# Patient Record
Sex: MALE | Race: WHITE | NOT HISPANIC OR LATINO | Employment: OTHER | URBAN - METROPOLITAN AREA
[De-identification: names, ages, dates, MRNs, and addresses within clinical notes are randomized per-mention and may not be internally consistent; named-entity substitution may affect disease eponyms.]

---

## 2023-04-14 ENCOUNTER — HOSPITAL ENCOUNTER (OUTPATIENT)
Dept: DATA CONVERSION | Facility: HOSPITAL | Age: 73
End: 2023-04-14
Attending: PODIATRIST | Admitting: PODIATRIST

## 2023-04-14 DIAGNOSIS — G89.29 OTHER CHRONIC PAIN: ICD-10-CM

## 2023-04-14 DIAGNOSIS — I47.10 SUPRAVENTRICULAR TACHYCARDIA (CMS-HCC): ICD-10-CM

## 2023-04-14 DIAGNOSIS — Z79.82 LONG TERM (CURRENT) USE OF ASPIRIN: ICD-10-CM

## 2023-04-14 DIAGNOSIS — J44.9 CHRONIC OBSTRUCTIVE PULMONARY DISEASE, UNSPECIFIED (MULTI): ICD-10-CM

## 2023-04-14 DIAGNOSIS — H35.30 UNSPECIFIED MACULAR DEGENERATION: ICD-10-CM

## 2023-04-14 DIAGNOSIS — M54.9 DORSALGIA, UNSPECIFIED: ICD-10-CM

## 2023-04-14 DIAGNOSIS — I70.262 ATHEROSCLEROSIS OF NATIVE ARTERIES OF EXTREMITIES WITH GANGRENE, LEFT LEG (MULTI): ICD-10-CM

## 2023-04-14 DIAGNOSIS — F22 DELUSIONAL DISORDERS (MULTI): ICD-10-CM

## 2023-04-14 DIAGNOSIS — A52.3: ICD-10-CM

## 2023-04-14 DIAGNOSIS — E87.1 HYPO-OSMOLALITY AND HYPONATREMIA: ICD-10-CM

## 2023-04-14 DIAGNOSIS — N40.0 BENIGN PROSTATIC HYPERPLASIA WITHOUT LOWER URINARY TRACT SYMPTOMS: ICD-10-CM

## 2023-04-14 DIAGNOSIS — F03.92: ICD-10-CM

## 2023-04-14 DIAGNOSIS — F03.918 UNSPECIFIED DEMENTIA, UNSPECIFIED SEVERITY, WITH OTHER BEHAVIORAL DISTURBANCE (MULTI): ICD-10-CM

## 2023-04-14 DIAGNOSIS — M54.12 RADICULOPATHY, CERVICAL REGION: ICD-10-CM

## 2023-04-14 DIAGNOSIS — I10 ESSENTIAL (PRIMARY) HYPERTENSION: ICD-10-CM

## 2023-04-14 DIAGNOSIS — I96 GANGRENE, NOT ELSEWHERE CLASSIFIED (MULTI): ICD-10-CM

## 2023-04-14 DIAGNOSIS — E87.6 HYPOKALEMIA: ICD-10-CM

## 2023-04-14 DIAGNOSIS — F03.94: ICD-10-CM

## 2023-04-14 DIAGNOSIS — E78.5 HYPERLIPIDEMIA, UNSPECIFIED: ICD-10-CM

## 2023-04-14 DIAGNOSIS — F17.200 NICOTINE DEPENDENCE, UNSPECIFIED, UNCOMPLICATED: ICD-10-CM

## 2023-04-16 LAB
GRAM STAIN: NORMAL
TISSUE/WOUND CULTURE/SMEAR: NORMAL

## 2023-04-22 LAB
ATRIAL RATE: 64 BPM
P AXIS: 35 DEGREES
P OFFSET: 198 MS
P ONSET: 139 MS
PR INTERVAL: 160 MS
Q ONSET: 219 MS
QRS COUNT: 10 BEATS
QRS DURATION: 90 MS
QT INTERVAL: 436 MS
QTC CALCULATION(BAZETT): 449 MS
QTC FREDERICIA: 445 MS
R AXIS: 20 DEGREES
T AXIS: 37 DEGREES
T OFFSET: 437 MS
VENTRICULAR RATE: 64 BPM

## 2023-05-08 LAB
COMPLETE PATHOLOGY REPORT: NORMAL
CONVERTED CLINICAL DIAGNOSIS-HISTORY: NORMAL
CONVERTED FINAL DIAGNOSIS: NORMAL
CONVERTED FINAL REPORT PDF LINK TO COPY AND PASTE: NORMAL
CONVERTED GROSS DESCRIPTION: NORMAL

## 2023-08-02 ENCOUNTER — TELEPHONE (OUTPATIENT)
Dept: PRIMARY CARE | Facility: CLINIC | Age: 73
End: 2023-08-02

## 2023-09-07 VITALS — HEIGHT: 69 IN | BODY MASS INDEX: 34.06 KG/M2 | WEIGHT: 229.94 LBS

## 2023-09-14 NOTE — H&P
History & Physical Reviewed:   I have reviewed the History and Physical dated:  04-Apr-2023   History and Physical reviewed and relevant findings noted. Patient examined to review pertinent physical  findings.: No significant changes   Home Medications Reviewed: no changes noted   Allergies Reviewed: no changes noted       ERAS (Enhanced Recovery After Surgery):  ·  ERAS Patient: no     Consent:   COVID-19 Consent:  ·  COVID-19 Risk Consent Surgeon has reviewed key risks related to the risk of darrell COVID-19 and if they contract COVID-19 what the risks are.     Attestation:   Note Completion:  I am a:  Resident/Fellow   Attending Attestation I saw and evaluated the patient.  I personally obtained the key and critical portions of the history and physical exam or was physically present for key and  critical portions performed by the resident/fellow. I reviewed the resident/fellow?s documentation and discussed the patient with the resident/fellow.  I agree with the resident/fellow?s medical decision making as documented in the note.     I personally evaluated the patient on 14-Apr-2023         Electronic Signatures:  Eldon Mcqueen (NORIS)  (Signed 17-Apr-2023 08:22)   Authored: Note Completion   Co-Signer: History & Physical Reviewed, ERAS, Consent, Note Completion  Jayla Padilla (NORIS (Resident))  (Signed 14-Apr-2023 07:10)   Authored: History & Physical Reviewed, ERAS, Consent,  Note Completion      Last Updated: 17-Apr-2023 08:22 by Eldon Mcqueen (NORIS)

## 2023-10-02 NOTE — OP NOTE
PROCEDURE DETAILS    Preoperative Diagnosis:  PAD Left foot  Dry Gangrene left hallux   Postoperative Diagnosis:  PAD Left foot  Dry Gangrene left hallux   Surgeon: Dr. Eldon Mcqueen DPM   Resident/Fellow/Other Assistant: Jayla Padilla, PGY-3    Procedure:  1. Left hallux amputation  Anesthesia: MAC  Estimated Blood Loss: 5cc  Findings: See operative report  Specimens(s) Collected: yes,  Deep culture swab sent to micro   Complications: None  Patient Returned To/Condition: PACU with VSS and perfusion noted to the digits                                Attestation:   Note Completion:  Attending Attestation I was present for the entire procedure    I am a: Resident/Fellow         Electronic Signatures:  Eldon Mcqueen (NORIS)  (Signed 17-Apr-2023 08:22)   Authored: Note Completion   Co-Signer: Post-Operative Note, Chart Review, Note Completion  Jayla Padilla (NORIS (Resident))  (Signed 14-Apr-2023 08:40)   Authored: Post-Operative Note, Chart Review, Note Completion      Last Updated: 17-Apr-2023 08:22 by Eldon Mcqueen (NORIS)

## 2024-02-15 ENCOUNTER — APPOINTMENT (OUTPATIENT)
Dept: RADIOLOGY | Facility: HOSPITAL | Age: 74
End: 2024-02-15
Payer: MEDICARE

## 2024-02-15 ENCOUNTER — HOSPITAL ENCOUNTER (EMERGENCY)
Facility: HOSPITAL | Age: 74
Discharge: HOME | End: 2024-02-15
Attending: STUDENT IN AN ORGANIZED HEALTH CARE EDUCATION/TRAINING PROGRAM
Payer: MEDICARE

## 2024-02-15 ENCOUNTER — APPOINTMENT (OUTPATIENT)
Dept: CARDIOLOGY | Facility: HOSPITAL | Age: 74
End: 2024-02-15
Payer: MEDICARE

## 2024-02-15 VITALS
SYSTOLIC BLOOD PRESSURE: 117 MMHG | TEMPERATURE: 98.6 F | RESPIRATION RATE: 17 BRPM | HEIGHT: 68 IN | WEIGHT: 158 LBS | HEART RATE: 55 BPM | DIASTOLIC BLOOD PRESSURE: 58 MMHG | OXYGEN SATURATION: 96 % | BODY MASS INDEX: 23.95 KG/M2

## 2024-02-15 DIAGNOSIS — M54.50 CHRONIC LOW BACK PAIN WITHOUT SCIATICA, UNSPECIFIED BACK PAIN LATERALITY: ICD-10-CM

## 2024-02-15 DIAGNOSIS — G89.29 CHRONIC LOW BACK PAIN WITHOUT SCIATICA, UNSPECIFIED BACK PAIN LATERALITY: ICD-10-CM

## 2024-02-15 DIAGNOSIS — R07.9 CHEST PAIN, UNSPECIFIED TYPE: Primary | ICD-10-CM

## 2024-02-15 DIAGNOSIS — R42 DIZZINESS: ICD-10-CM

## 2024-02-15 LAB
ALBUMIN SERPL BCP-MCNC: 3.8 G/DL (ref 3.4–5)
ALP SERPL-CCNC: 52 U/L (ref 33–136)
ALT SERPL W P-5'-P-CCNC: 10 U/L (ref 10–52)
ANION GAP SERPL CALC-SCNC: 9 MMOL/L (ref 10–20)
APPEARANCE UR: CLEAR
APTT PPP: 39 SECONDS (ref 27–38)
AST SERPL W P-5'-P-CCNC: 15 U/L (ref 9–39)
BASOPHILS # BLD AUTO: 0.06 X10*3/UL (ref 0–0.1)
BASOPHILS NFR BLD AUTO: 0.7 %
BILIRUB SERPL-MCNC: 0.4 MG/DL (ref 0–1.2)
BILIRUB UR STRIP.AUTO-MCNC: NEGATIVE MG/DL
BNP SERPL-MCNC: 26 PG/ML (ref 0–99)
BUN SERPL-MCNC: 13 MG/DL (ref 6–23)
CALCIUM SERPL-MCNC: 9 MG/DL (ref 8.6–10.3)
CARDIAC TROPONIN I PNL SERPL HS: 6 NG/L (ref 0–20)
CARDIAC TROPONIN I PNL SERPL HS: 7 NG/L (ref 0–20)
CHLORIDE SERPL-SCNC: 104 MMOL/L (ref 98–107)
CO2 SERPL-SCNC: 28 MMOL/L (ref 21–32)
COLOR UR: YELLOW
CREAT SERPL-MCNC: 0.64 MG/DL (ref 0.5–1.3)
EGFRCR SERPLBLD CKD-EPI 2021: >90 ML/MIN/1.73M*2
EOSINOPHIL # BLD AUTO: 0.15 X10*3/UL (ref 0–0.4)
EOSINOPHIL NFR BLD AUTO: 1.8 %
ERYTHROCYTE [DISTWIDTH] IN BLOOD BY AUTOMATED COUNT: 14.6 % (ref 11.5–14.5)
FLUAV RNA RESP QL NAA+PROBE: NOT DETECTED
FLUBV RNA RESP QL NAA+PROBE: NOT DETECTED
GLUCOSE SERPL-MCNC: 91 MG/DL (ref 74–99)
GLUCOSE UR STRIP.AUTO-MCNC: NEGATIVE MG/DL
HCT VFR BLD AUTO: 34.4 % (ref 41–52)
HGB BLD-MCNC: 11.7 G/DL (ref 13.5–17.5)
IMM GRANULOCYTES # BLD AUTO: 0.01 X10*3/UL (ref 0–0.5)
IMM GRANULOCYTES NFR BLD AUTO: 0.1 % (ref 0–0.9)
INR PPP: 1 (ref 0.9–1.1)
KETONES UR STRIP.AUTO-MCNC: NEGATIVE MG/DL
LACTATE SERPL-SCNC: 0.6 MMOL/L (ref 0.4–2)
LEUKOCYTE ESTERASE UR QL STRIP.AUTO: NEGATIVE
LYMPHOCYTES # BLD AUTO: 1.85 X10*3/UL (ref 0.8–3)
LYMPHOCYTES NFR BLD AUTO: 22.4 %
MAGNESIUM SERPL-MCNC: 1.96 MG/DL (ref 1.6–2.4)
MCH RBC QN AUTO: 31.7 PG (ref 26–34)
MCHC RBC AUTO-ENTMCNC: 34 G/DL (ref 32–36)
MCV RBC AUTO: 93 FL (ref 80–100)
MONOCYTES # BLD AUTO: 0.77 X10*3/UL (ref 0.05–0.8)
MONOCYTES NFR BLD AUTO: 9.3 %
NEUTROPHILS # BLD AUTO: 5.42 X10*3/UL (ref 1.6–5.5)
NEUTROPHILS NFR BLD AUTO: 65.7 %
NITRITE UR QL STRIP.AUTO: NEGATIVE
NRBC BLD-RTO: 0 /100 WBCS (ref 0–0)
PH UR STRIP.AUTO: 5 [PH]
PLATELET # BLD AUTO: 282 X10*3/UL (ref 150–450)
POTASSIUM SERPL-SCNC: 3.5 MMOL/L (ref 3.5–5.3)
PROT SERPL-MCNC: 6 G/DL (ref 6.4–8.2)
PROT UR STRIP.AUTO-MCNC: NEGATIVE MG/DL
PROTHROMBIN TIME: 11.8 SECONDS (ref 9.8–12.8)
RBC # BLD AUTO: 3.69 X10*6/UL (ref 4.5–5.9)
RBC # UR STRIP.AUTO: NEGATIVE /UL
SARS-COV-2 RNA RESP QL NAA+PROBE: NOT DETECTED
SODIUM SERPL-SCNC: 137 MMOL/L (ref 136–145)
SP GR UR STRIP.AUTO: 1.06
TSH SERPL-ACNC: 0.8 MIU/L (ref 0.44–3.98)
UROBILINOGEN UR STRIP.AUTO-MCNC: <2 MG/DL
WBC # BLD AUTO: 8.3 X10*3/UL (ref 4.4–11.3)

## 2024-02-15 PROCEDURE — 2550000001 HC RX 255 CONTRASTS: Performed by: STUDENT IN AN ORGANIZED HEALTH CARE EDUCATION/TRAINING PROGRAM

## 2024-02-15 PROCEDURE — 81003 URINALYSIS AUTO W/O SCOPE: CPT

## 2024-02-15 PROCEDURE — 87636 SARSCOV2 & INF A&B AMP PRB: CPT

## 2024-02-15 PROCEDURE — 80053 COMPREHEN METABOLIC PANEL: CPT

## 2024-02-15 PROCEDURE — 71275 CT ANGIOGRAPHY CHEST: CPT | Performed by: RADIOLOGY

## 2024-02-15 PROCEDURE — 70450 CT HEAD/BRAIN W/O DYE: CPT | Performed by: RADIOLOGY

## 2024-02-15 PROCEDURE — 85610 PROTHROMBIN TIME: CPT

## 2024-02-15 PROCEDURE — 74174 CTA ABD&PLVS W/CONTRAST: CPT | Performed by: RADIOLOGY

## 2024-02-15 PROCEDURE — 36415 COLL VENOUS BLD VENIPUNCTURE: CPT

## 2024-02-15 PROCEDURE — 36415 COLL VENOUS BLD VENIPUNCTURE: CPT | Performed by: STUDENT IN AN ORGANIZED HEALTH CARE EDUCATION/TRAINING PROGRAM

## 2024-02-15 PROCEDURE — 83605 ASSAY OF LACTIC ACID: CPT

## 2024-02-15 PROCEDURE — 93005 ELECTROCARDIOGRAM TRACING: CPT

## 2024-02-15 PROCEDURE — 84484 ASSAY OF TROPONIN QUANT: CPT

## 2024-02-15 PROCEDURE — 83735 ASSAY OF MAGNESIUM: CPT

## 2024-02-15 PROCEDURE — 99285 EMERGENCY DEPT VISIT HI MDM: CPT | Mod: 25 | Performed by: STUDENT IN AN ORGANIZED HEALTH CARE EDUCATION/TRAINING PROGRAM

## 2024-02-15 PROCEDURE — 71275 CT ANGIOGRAPHY CHEST: CPT

## 2024-02-15 PROCEDURE — 84443 ASSAY THYROID STIM HORMONE: CPT

## 2024-02-15 PROCEDURE — 85025 COMPLETE CBC W/AUTO DIFF WBC: CPT

## 2024-02-15 PROCEDURE — 84484 ASSAY OF TROPONIN QUANT: CPT | Performed by: STUDENT IN AN ORGANIZED HEALTH CARE EDUCATION/TRAINING PROGRAM

## 2024-02-15 PROCEDURE — 85730 THROMBOPLASTIN TIME PARTIAL: CPT

## 2024-02-15 PROCEDURE — 83880 ASSAY OF NATRIURETIC PEPTIDE: CPT

## 2024-02-15 PROCEDURE — 70450 CT HEAD/BRAIN W/O DYE: CPT

## 2024-02-15 RX ORDER — AMLODIPINE BESYLATE 10 MG/1
1 TABLET ORAL DAILY
COMMUNITY
Start: 2023-08-07 | End: 2024-02-15 | Stop reason: ENTERED-IN-ERROR

## 2024-02-15 RX ORDER — NAPROXEN SODIUM 220 MG/1
81 TABLET, FILM COATED ORAL NIGHTLY
COMMUNITY
Start: 2011-03-28

## 2024-02-15 RX ORDER — ATORVASTATIN CALCIUM 80 MG/1
80 TABLET, FILM COATED ORAL NIGHTLY
COMMUNITY
Start: 2023-04-04 | End: 2024-04-03

## 2024-02-15 RX ORDER — CARBOXYMETHYLCELLULOSE SODIUM 5 MG/ML
1 SOLUTION/ DROPS OPHTHALMIC 3 TIMES DAILY PRN
COMMUNITY
Start: 2022-06-10 | End: 2024-02-20 | Stop reason: ALTCHOICE

## 2024-02-15 RX ORDER — CHOLECALCIFEROL (VITAMIN D3) 50 MCG
2000 TABLET ORAL DAILY
COMMUNITY
Start: 2023-05-26

## 2024-02-15 RX ORDER — DONEPEZIL HYDROCHLORIDE 10 MG/1
1 TABLET, FILM COATED ORAL NIGHTLY
COMMUNITY
Start: 2023-05-26

## 2024-02-15 RX ORDER — MULTIVIT-MIN/IRON FUM/FOLIC AC 7.5 MG-4
1 TABLET ORAL DAILY
COMMUNITY
Start: 2020-05-01

## 2024-02-15 RX ORDER — LISINOPRIL 10 MG/1
1 TABLET ORAL DAILY
COMMUNITY
Start: 2023-05-26

## 2024-02-15 RX ORDER — SENNOSIDES 8.6 MG/1
1 TABLET ORAL NIGHTLY
COMMUNITY
Start: 2023-05-26

## 2024-02-15 RX ORDER — PREGABALIN 50 MG/1
50 CAPSULE ORAL 2 TIMES DAILY
COMMUNITY
Start: 2023-04-11

## 2024-02-15 RX ORDER — ACETAMINOPHEN 325 MG/1
650 TABLET ORAL EVERY 6 HOURS PRN
COMMUNITY
Start: 2011-03-28 | End: 2024-02-28 | Stop reason: HOSPADM

## 2024-02-15 RX ORDER — ALBUTEROL SULFATE 90 UG/1
2 AEROSOL, METERED RESPIRATORY (INHALATION) EVERY 6 HOURS PRN
COMMUNITY
Start: 2023-11-06

## 2024-02-15 RX ORDER — IBUPROFEN 600 MG/1
600 TABLET ORAL EVERY 8 HOURS PRN
COMMUNITY
Start: 2022-09-20 | End: 2024-02-28 | Stop reason: HOSPADM

## 2024-02-15 RX ORDER — CLOPIDOGREL BISULFATE 75 MG/1
75 TABLET ORAL DAILY
COMMUNITY
Start: 2023-02-26 | End: 2024-04-03

## 2024-02-15 RX ADMIN — IOHEXOL 90 ML: 350 INJECTION, SOLUTION INTRAVENOUS at 17:04

## 2024-02-15 ASSESSMENT — COLUMBIA-SUICIDE SEVERITY RATING SCALE - C-SSRS
2. HAVE YOU ACTUALLY HAD ANY THOUGHTS OF KILLING YOURSELF?: NO
1. IN THE PAST MONTH, HAVE YOU WISHED YOU WERE DEAD OR WISHED YOU COULD GO TO SLEEP AND NOT WAKE UP?: NO
6. HAVE YOU EVER DONE ANYTHING, STARTED TO DO ANYTHING, OR PREPARED TO DO ANYTHING TO END YOUR LIFE?: NO

## 2024-02-15 ASSESSMENT — PAIN SCALES - GENERAL: PAINLEVEL_OUTOF10: 5 - MODERATE PAIN

## 2024-02-15 ASSESSMENT — LIFESTYLE VARIABLES
HAVE YOU EVER FELT YOU SHOULD CUT DOWN ON YOUR DRINKING: NO
EVER FELT BAD OR GUILTY ABOUT YOUR DRINKING: NO
HAVE PEOPLE ANNOYED YOU BY CRITICIZING YOUR DRINKING: NO

## 2024-02-15 ASSESSMENT — HEART SCORE
HISTORY: SLIGHTLY SUSPICIOUS
HEART SCORE: 3
RISK FACTORS: 1-2 RISK FACTORS
AGE: 65+
TROPONIN: LESS THAN OR EQUAL TO NORMAL LIMIT
ECG: NORMAL

## 2024-02-15 ASSESSMENT — PAIN - FUNCTIONAL ASSESSMENT: PAIN_FUNCTIONAL_ASSESSMENT: 0-10

## 2024-02-15 ASSESSMENT — PAIN DESCRIPTION - DESCRIPTORS: DESCRIPTORS: ACHING

## 2024-02-15 NOTE — PROGRESS NOTES
Pharmacy Medication History Review    Oni Newsome is a 73 y.o. male admitted for No Principal Problem: There is no principal problem currently on the Problem List. Please update the Problem List and refresh.. Pharmacy reviewed the patient's [id number] medications and allergies for accuracy.    The list below reflectives the updated PTA list. Please review each medication in order reconciliation for additional clarification and justification.  Prior to Admission medications    Medication Sig Start Date End Date Taking? Authorizing Provider   acetaminophen (Tylenol) 325 mg tablet Take 2 tablets (650 mg) by mouth every 6 hours if needed. 3/28/11  Yes Historical Provider, MD   albuterol 90 mcg/actuation inhaler Inhale 2 puffs every 6 hours if needed. 11/6/23  Yes Historical Provider, MD   aspirin 81 mg chewable tablet Chew 1 tablet (81 mg) once daily at bedtime. 3/28/11  Yes Historical Provider, MD   atorvastatin (Lipitor) 80 mg tablet Take 1 tablet (80 mg) by mouth once daily at bedtime. 4/4/23 4/3/24 Yes Historical Provider, MD   carboxymethylcellulose (Refresh Plus) 0.5 % ophthalmic solution Administer 1 drop into both eyes 3 times a day as needed for dry eyes. 6/10/22  Yes Historical Provider, MD   cholecalciferol (Vitamin D-3) 50 MCG (2000 UT) tablet Take 1 tablet (2,000 Units) by mouth once daily. 5/26/23  Yes Historical Provider, MD   clopidogrel (Plavix) 75 mg tablet Take 1 tablet (75 mg) by mouth once daily. 2/26/23 4/3/24 Yes Historical Provider, MD   donepezil (Aricept) 10 mg tablet Take 1 tablet (10 mg) by mouth once daily at bedtime. 5/26/23  Yes Historical Provider, MD   ibuprofen 600 mg tablet Take 1 tablet (600 mg) by mouth every 8 hours if needed. 9/20/22  Yes Historical Provider, MD   lisinopril 10 mg tablet Take 1 tablet (10 mg) by mouth once daily. 5/26/23  Yes Historical Provider, MD   multivitamin with minerals (multivit-min-iron fum-folic ac) tablet Take 1 tablet by mouth once daily.  5/1/20  Yes Historical Provider, MD   pregabalin (Lyrica) 50 mg capsule Take 1 capsule (50 mg) by mouth 2 times a day. 4/11/23  Yes Historical Provider, MD   sennosides (Senokot) 8.6 mg tablet Take 1 tablet (8.6 mg) by mouth once daily at bedtime. 5/26/23  Yes Historical Provider, MD   UNABLE TO FIND Take 1 capsule by mouth 2 times a day. Med Name: SciatiEase    Historical Provider, MD        The list below reflectives the updated allergy list. Please review each documented allergy for additional clarification and justification.  Allergies  Reviewed by Jemima Caldera RN on 2/15/2024        Severity Reactions Comments    Gabapentin Medium Dizziness             Below are additional concerns with the patient's PTA list.      Sarita Cortes CPhT

## 2024-02-15 NOTE — ED PROVIDER NOTES
"CC: Back Pain (PT. BIBA FROM HOME. PER EMS, PT. C/O BACK AND CHEST PAIN. PT. STATES THAT HE HAS BEEN HAVING PAIN ALL OVER FOR PAST COUPLE DAYS, HAVING DIFFICULTY WALKING WITH WALKER. ALSO C/O WEAKNESS. PT. DENIES FEVERS OR FLU SYMPTOMS. )     History provided by: Patient  Limitations to History: None    HPI:  Patient is a 73-year-old male with history of Alzheimer's disease with dementia and delusions, hyperlipidemia, COPD, macular degeneration, subdural hematoma history, AAA who presents with multiple medical complaints.  He endorses generalized weakness, diffuse bodyaches ongoing for the past few days.  He endorses chest pain that radiates to his back, \" left lung\" pain, difficulty breathing, room spinning dizziness.  He denies any recent falls, trauma.  He does have medication list with him and he is on Plavix which he states he takes daily.  He is alert and oriented to person, place and time.  He is moving all extremities, no aphasia, no facial droop or notable drift.  He denies any fevers, chills, neck pain, vision changes.  He states his mouth is burning, no intraoral lesions noted. Patient walks with walker at baseline.    External Records Reviewed:  I reviewed prior ED visits, Care Everywhere, discharge summaries and outpatient records as appropriate.   ???????????????????????????????????????????????????????????????  Triage Vitals:  T 37 °C (98.6 °F)  HR 58  /60  RR 16  O2 95 % None (Room air)    Physical Exam  Vitals and nursing note reviewed.   Constitutional:       General: He is not in acute distress.     Appearance: Normal appearance.   HENT:      Head: Normocephalic and atraumatic.   Eyes:      Conjunctiva/sclera: Conjunctivae normal.   Cardiovascular:      Rate and Rhythm: Normal rate and regular rhythm.      Pulses: Normal pulses.      Heart sounds: Normal heart sounds.   Pulmonary:      Effort: Pulmonary effort is normal. No respiratory distress.      Breath sounds: Normal breath sounds. "   Abdominal:      General: Abdomen is flat. There is no distension.      Palpations: Abdomen is soft.      Tenderness: There is no abdominal tenderness. There is no right CVA tenderness or left CVA tenderness.   Musculoskeletal:         General: Normal range of motion.      Cervical back: Normal range of motion and neck supple.   Skin:     General: Skin is warm and dry.   Neurological:      General: No focal deficit present.      Mental Status: He is alert and oriented to person, place, and time. Mental status is at baseline.      GCS: GCS eye subscore is 4. GCS verbal subscore is 5. GCS motor subscore is 6.      Cranial Nerves: Cranial nerves 2-12 are intact.      Sensory: Sensation is intact.      Motor: Motor function is intact.      Coordination: Finger-Nose-Finger Test normal.   Psychiatric:         Mood and Affect: Mood normal.         Behavior: Behavior normal.        ???????????????????????????????????????????????????????????????  ED Course/Treatment/Medical Decision Making  MDM:  Patient is a 73-year-old male who presents with multiple medical complaints.  Vital signs are stable, patient does not appear septic or peritonitic.  Given complaints broad differential considered including acute coronary syndrome, aortic dissection, electrolyte abnormality, CVA, anemia, COPD exacerbation, pneumonia.  Patient had clear lung exam, low suspicion for COPD exacerbation, no acute respiratory distress.  No focal neurological deficits on my examination, signs of trauma, bleeding, bruising.  Patient does have lower extremity symmetric weakness but able to move all extremities.  It appears he lives at home alone, has no healthcare proxy on file.  Given history of AAA on my chart review and endorsement of chest pain, back pain I did obtain CT angio chest, abdomen and pelvis to evaluate for aortic pathology.  Patient had equal radial pulses for me.  In the absence of focal neurological deficits, have low suspicion for acute  intracranial pathology however CTH obtained due to history of SDH, Plavix use. Given patient's social determinants of health, age plan for admission which patient is agreeable to for placement, PT, OT.  I did have brief code discussion with patient and he is DNR/DNI at this time.      ED Course:  ED Course as of 02/15/24 1622   Thu Feb 15, 2024   1449 EKG reviewed with normal sinus rhythm rate 63, TX interval 140 ms, QRS 97 ms, QTc 465 ms, no acute ST segment elevations or depressions [SA]   1618 CBC with anemia overall similar to baseline, TSH within normal limits, troponin within normal limits and BNP, metabolic panel within normal limits [SA]      ED Course User Index  [SA] Jie Dutta DO         Diagnoses as of 02/15/24 1622   Chest pain, unspecified type   Dizziness       EKG Interpretation:  See ED Course/Below:    Independent Interpretation of Studies:  I independently interpreted labs/imaging as stated in ED Course or below.    Differential diagnoses considered include but are not limited to: See MDM/Below:    Social Determinants Limiting Care:  Poor health literacy      Disposition:  Patient signed out pending completion of workup including imaging, likely to be admitting for placement if workup negative    YAKOV Doyle, PGY-2    I reviewed the case with the attending ED physician. The attending ED physician agrees with the plan. Patient and/or patient´s representative was counseled regarding labs, imaging, likely diagnosis, and plan. All questions were answered.    Disclaimer: This note was dictated by speech recognition.  Attempt at proofreading was made to minimize errors.  Errors in transcription may be present.  Please call if questions.    Procedures ? [employer] last updated 2/15/2024 4:22 PM        Jie Dutta DO  Resident  02/15/24 1622

## 2024-02-15 NOTE — ED TRIAGE NOTES
PT. TIYN FROM HOME. PER EMS, PT. C/O BACK AND CHEST PAIN. PT. STATES THAT HE HAS BEEN HAVING PAIN ALL OVER FOR PAST COUPLE DAYS, HAVING DIFFICULTY WALKING WITH WALKER. ALSO C/O WEAKNESS. PT. DENIES FEVERS OR FLU SYMPTOMS.

## 2024-02-16 LAB
ATRIAL RATE: 63 BPM
HOLD SPECIMEN: NORMAL
P AXIS: 53 DEGREES
PR INTERVAL: 148 MS
Q ONSET: 252 MS
QRS COUNT: 10 BEATS
QRS DURATION: 97 MS
QT INTERVAL: 454 MS
QTC CALCULATION(BAZETT): 465 MS
QTC FREDERICIA: 461 MS
R AXIS: 46 DEGREES
T AXIS: 49 DEGREES
T OFFSET: 479 MS
VENTRICULAR RATE: 63 BPM

## 2024-02-19 PROBLEM — K59.00 CONSTIPATION: Status: RESOLVED | Noted: 2024-02-19 | Resolved: 2024-02-19

## 2024-02-19 PROBLEM — F03.B4: Status: RESOLVED | Noted: 2024-02-19 | Resolved: 2024-02-19

## 2024-02-19 PROBLEM — E78.5 HYPERLIPIDEMIA: Status: RESOLVED | Noted: 2024-02-19 | Resolved: 2024-02-19

## 2024-02-19 PROBLEM — M54.12 CERVICAL RADICULOPATHY: Status: RESOLVED | Noted: 2024-02-19 | Resolved: 2024-02-19

## 2024-02-19 PROBLEM — I70.202: Status: RESOLVED | Noted: 2024-02-19 | Resolved: 2024-02-19
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
## 2024-02-19 PROBLEM — B96.81 GASTRITIS DUE TO HELICOBACTER SPECIES: Status: RESOLVED | Noted: 2024-02-19 | Resolved: 2024-02-19

## 2024-02-19 PROBLEM — F17.200 NICOTINE DEPENDENCE: Status: RESOLVED | Noted: 2024-02-19 | Resolved: 2024-02-19

## 2024-02-19 PROBLEM — F02.82: Status: RESOLVED | Noted: 2024-02-19 | Resolved: 2024-02-19

## 2024-02-19 PROBLEM — A52.3 NEUROSYPHILIS (HHS-HCC): Status: RESOLVED | Noted: 2024-02-19 | Resolved: 2024-02-19

## 2024-02-19 PROBLEM — F17.200 NICOTINE DEPENDENCE, UNSPECIFIED, UNCOMPLICATED: Status: RESOLVED | Noted: 2024-02-19 | Resolved: 2024-02-19

## 2024-02-19 PROBLEM — F03.918 DEMENTIA WITH BEHAVIORAL DISTURBANCE (MULTI): Status: RESOLVED | Noted: 2024-02-19 | Resolved: 2024-02-19

## 2024-02-19 PROBLEM — G30.9: Status: RESOLVED | Noted: 2024-02-19 | Resolved: 2024-02-19

## 2024-02-19 PROBLEM — K92.0 HEMATEMESIS: Status: RESOLVED | Noted: 2024-02-19 | Resolved: 2024-02-19

## 2024-02-19 PROBLEM — M50.30 DEGENERATION OF CERVICAL INTERVERTEBRAL DISC: Status: RESOLVED | Noted: 2024-02-19 | Resolved: 2024-02-19

## 2024-02-19 PROBLEM — M54.50 LOW BACK PAIN: Status: RESOLVED | Noted: 2024-02-19 | Resolved: 2024-02-19

## 2024-02-19 PROBLEM — L02.93 CARBUNCLE: Status: RESOLVED | Noted: 2024-02-19 | Resolved: 2024-02-19

## 2024-02-19 PROBLEM — I10 PRIMARY HYPERTENSION: Status: RESOLVED | Noted: 2024-02-19 | Resolved: 2024-02-19

## 2024-02-19 PROBLEM — R94.31 PROLONGED Q-T INTERVAL ON ECG: Status: RESOLVED | Noted: 2024-02-19 | Resolved: 2024-02-19

## 2024-02-19 PROBLEM — L03.90 CELLULITIS: Status: RESOLVED | Noted: 2023-02-18 | Resolved: 2024-02-19

## 2024-02-19 PROBLEM — J44.9 CHRONIC OBSTRUCTIVE PULMONARY DISEASE (MULTI): Status: RESOLVED | Noted: 2024-02-19 | Resolved: 2024-02-19

## 2024-02-19 PROBLEM — M54.16 RADICULOPATHY, LUMBAR REGION: Status: RESOLVED | Noted: 2024-02-19 | Resolved: 2024-02-19

## 2024-02-19 PROBLEM — I62.00 SUBDURAL HEMORRHAGE (MULTI): Status: RESOLVED | Noted: 2024-02-19 | Resolved: 2024-02-19

## 2024-02-19 PROBLEM — E66.9 OBESITY WITH BODY MASS INDEX 30 OR GREATER: Status: RESOLVED | Noted: 2024-02-19 | Resolved: 2024-02-19

## 2024-02-19 PROBLEM — R91.8 MULTIPLE NODULES OF LUNG: Status: RESOLVED | Noted: 2024-02-19 | Resolved: 2024-02-19

## 2024-02-19 PROBLEM — E87.1 HYPONATREMIA: Status: RESOLVED | Noted: 2024-02-19 | Resolved: 2024-02-19

## 2024-02-19 PROBLEM — M48.062 LUMBAR STENOSIS WITH NEUROGENIC CLAUDICATION: Status: RESOLVED | Noted: 2024-02-19 | Resolved: 2024-02-19

## 2024-02-19 PROBLEM — E72.20 HYPERAMMONEMIA (MULTI): Status: RESOLVED | Noted: 2024-02-19 | Resolved: 2024-02-19

## 2024-02-19 PROBLEM — F29 PSYCHOSIS (MULTI): Status: RESOLVED | Noted: 2024-02-19 | Resolved: 2024-02-19

## 2024-02-19 PROBLEM — M54.50 LOW BACK PAIN, UNSPECIFIED: Status: RESOLVED | Noted: 2024-02-19 | Resolved: 2024-02-19

## 2024-02-19 PROBLEM — H25.13 AGE-RELATED NUCLEAR CATARACT, BILATERAL: Status: RESOLVED | Noted: 2024-02-19 | Resolved: 2024-02-19

## 2024-02-19 PROBLEM — N52.9 IMPOTENCE OF ORGANIC ORIGIN: Status: RESOLVED | Noted: 2024-02-19 | Resolved: 2024-02-19

## 2024-02-19 PROBLEM — Z59.86 FINANCIAL INSECURITY: Status: RESOLVED | Noted: 2024-02-19 | Resolved: 2024-02-19

## 2024-02-19 PROBLEM — G89.29 CHRONIC PAIN: Status: RESOLVED | Noted: 2024-02-19 | Resolved: 2024-02-19

## 2024-02-19 PROBLEM — G93.40 ACUTE ENCEPHALOPATHY: Status: RESOLVED | Noted: 2024-02-19 | Resolved: 2024-02-19

## 2024-02-19 PROBLEM — I96 GANGRENE (MULTI): Status: RESOLVED | Noted: 2024-02-19 | Resolved: 2024-02-19

## 2024-02-19 PROBLEM — K29.70 GASTRITIS DUE TO HELICOBACTER SPECIES: Status: RESOLVED | Noted: 2024-02-19 | Resolved: 2024-02-19

## 2024-02-19 PROBLEM — I47.10 SUPRAVENTRICULAR TACHYCARDIA (CMS-HCC): Status: RESOLVED | Noted: 2024-02-19 | Resolved: 2024-02-19

## 2024-02-19 PROBLEM — F22: Status: RESOLVED | Noted: 2024-02-19 | Resolved: 2024-02-19

## 2024-02-19 PROBLEM — R21 RASH AND OTHER NONSPECIFIC SKIN ERUPTION: Status: RESOLVED | Noted: 2024-02-19 | Resolved: 2024-02-19

## 2024-02-19 PROBLEM — I70.222 ATHEROSCLEROSIS OF NATIVE ARTERIES OF EXTREMITIES WITH REST PAIN, LEFT LEG (MULTI): Status: RESOLVED | Noted: 2024-02-19 | Resolved: 2024-02-19

## 2024-02-19 PROBLEM — R80.9 PROTEINURIA: Status: RESOLVED | Noted: 2024-02-19 | Resolved: 2024-02-19

## 2024-02-19 PROBLEM — M54.16 LUMBAR RADICULOPATHY: Status: RESOLVED | Noted: 2024-02-19 | Resolved: 2024-02-19

## 2024-02-19 PROBLEM — H35.30 MACULAR DEGENERATION: Status: RESOLVED | Noted: 2024-02-19 | Resolved: 2024-02-19

## 2024-02-19 PROBLEM — R63.4 ABNORMAL WEIGHT LOSS: Status: RESOLVED | Noted: 2024-02-19 | Resolved: 2024-02-19

## 2024-02-19 PROBLEM — S06.5XAA: Status: RESOLVED | Noted: 2024-02-19 | Resolved: 2024-02-19

## 2024-02-19 PROBLEM — K63.5 POLYP OF COLON: Status: RESOLVED | Noted: 2024-02-19 | Resolved: 2024-02-19

## 2024-02-19 PROBLEM — F22 DELUSIONAL DISORDER (MULTI): Status: RESOLVED | Noted: 2024-02-19 | Resolved: 2024-02-19

## 2024-02-19 PROBLEM — F03.911: Status: RESOLVED | Noted: 2024-02-19 | Resolved: 2024-02-19

## 2024-02-19 PROBLEM — F03.90 DEMENTIA (MULTI): Status: RESOLVED | Noted: 2024-02-19 | Resolved: 2024-02-19

## 2024-02-19 PROBLEM — E66.9 OBESITY: Status: RESOLVED | Noted: 2024-02-19 | Resolved: 2024-02-19

## 2024-02-19 PROBLEM — N13.9 URINARY OBSTRUCTION: Status: RESOLVED | Noted: 2024-02-19 | Resolved: 2024-02-19

## 2024-02-19 PROBLEM — Z91.199 NONCOMPLIANCE WITH TREATMENT: Status: RESOLVED | Noted: 2024-02-19 | Resolved: 2024-02-19

## 2024-02-19 PROBLEM — R10.9 ABDOMINAL PAIN: Status: RESOLVED | Noted: 2024-02-19 | Resolved: 2024-02-19

## 2024-02-19 PROBLEM — E87.6 HYPOKALEMIA: Status: RESOLVED | Noted: 2024-02-19 | Resolved: 2024-02-19

## 2024-02-19 PROBLEM — R54 AGE-RELATED PHYSICAL DEBILITY: Status: RESOLVED | Noted: 2024-02-19 | Resolved: 2024-02-19

## 2024-02-19 PROBLEM — I73.9 PAD (PERIPHERAL ARTERY DISEASE) (CMS-HCC): Status: RESOLVED | Noted: 2023-02-24 | Resolved: 2024-02-19

## 2024-02-19 PROBLEM — G31.84 MILD COGNITIVE IMPAIRMENT OF UNCERTAIN OR UNKNOWN ETIOLOGY: Status: RESOLVED | Noted: 2024-02-19 | Resolved: 2024-02-19

## 2024-02-19 PROBLEM — M47.816 LUMBAR SPONDYLOSIS: Status: RESOLVED | Noted: 2024-02-19 | Resolved: 2024-02-19

## 2024-02-20 ENCOUNTER — TELEMEDICINE (OUTPATIENT)
Dept: PRIMARY CARE | Facility: CLINIC | Age: 74
End: 2024-02-20
Payer: MEDICARE

## 2024-02-20 DIAGNOSIS — R59.0 HILAR LYMPHADENOPATHY: ICD-10-CM

## 2024-02-20 DIAGNOSIS — R91.8 MULTIPLE LUNG NODULES ON CT: Primary | ICD-10-CM

## 2024-02-20 PROCEDURE — 1125F AMNT PAIN NOTED PAIN PRSNT: CPT | Performed by: NURSE PRACTITIONER

## 2024-02-20 PROCEDURE — 99202 OFFICE O/P NEW SF 15 MIN: CPT | Performed by: NURSE PRACTITIONER

## 2024-02-20 PROCEDURE — 3008F BODY MASS INDEX DOCD: CPT | Performed by: NURSE PRACTITIONER

## 2024-02-20 PROCEDURE — 1159F MED LIST DOCD IN RCRD: CPT | Performed by: NURSE PRACTITIONER

## 2024-02-20 SDOH — ECONOMIC STABILITY: FOOD INSECURITY: WITHIN THE PAST 12 MONTHS, YOU WORRIED THAT YOUR FOOD WOULD RUN OUT BEFORE YOU GOT MONEY TO BUY MORE.: NEVER TRUE

## 2024-02-20 SDOH — ECONOMIC STABILITY: FOOD INSECURITY: WITHIN THE PAST 12 MONTHS, THE FOOD YOU BOUGHT JUST DIDN'T LAST AND YOU DIDN'T HAVE MONEY TO GET MORE.: NEVER TRUE

## 2024-02-20 ASSESSMENT — ENCOUNTER SYMPTOMS
DEPRESSION: 1
LOSS OF SENSATION IN FEET: 1
OCCASIONAL FEELINGS OF UNSTEADINESS: 1

## 2024-02-20 ASSESSMENT — LIFESTYLE VARIABLES
AUDIT-C TOTAL SCORE: 0
HOW OFTEN DO YOU HAVE SIX OR MORE DRINKS ON ONE OCCASION: NEVER
HOW MANY STANDARD DRINKS CONTAINING ALCOHOL DO YOU HAVE ON A TYPICAL DAY: PATIENT DOES NOT DRINK
SKIP TO QUESTIONS 9-10: 1
HOW OFTEN DO YOU HAVE A DRINK CONTAINING ALCOHOL: NEVER

## 2024-02-20 NOTE — PROGRESS NOTES
Subjective   Patient ID: Oni Newsome is a 73 y.o. male who presents for New Patient Visit (Oni Newsome has a new visit regarding a lung nodule.   Current cigarette smoker. He has been smoking for 55 years one pack per day. No personal or family history of cancer.//).  [id number] y.o. male presents today for lung nodule clinic.   New patient - new patient information packet sent to patient's home.     Telephone visit between Oni Newsome and Iram Bee CNP at [facility] Lung nodule clinic per patient request.     Current every day smoker.   1 ppd x 55 years.   No personal or family history of lung cancer.     CT angio chest, abdomen and pelvis dated 2/15/2024  Interval enlargement of left hilar lymph node which measures 2.4 x 1.8 cm series 501, image 119 not evident on prior chest CT.      LUNG, PLEURA, LARGE AIRWAYS: Allowing for difference in degree of  lung expansion the overall aeration is similar with persistent mild  mosaic attenuation and scattered multifocal small pulmonary nodules  some of which are clustered including adjacent 5 mm nodules left  lower lobe series 21, image 185 and 5 mm nodule right base image 211.  No consolidative pneumonia caroline edema or effusion. Bibasilar  subsegmental mucous plugging. Stable postinflammatory calcifications  left hilar region.    Review of Systems  Review of systems: Present-feeling well. Not present-chills, fatigue and fever.  Respiratory: Not present-difficulty breathing, cough, bloody sputum.  Cardiovascular: Not present-chest pain, palpitations, dyspnea on exertion.  Objective   There were no vitals taken for this visit.   Assessment/Plan   Diagnoses and all orders for this visit:  Multiple lung nodules on CT  -     NM PET CT lung CA initial diagnosis; Future  Hilar lymphadenopathy  -     NM PET CT lung CA initial diagnosis; Future  Other orders  -     Referral to Lung Nodule Center

## 2024-02-20 NOTE — PATIENT INSTRUCTIONS
Multiple lung nodules measuring up to 6 mm. Interval enlargement of left hilar lymph node which measures 2.4 x 1.8 cm. not evident on prior chest CT.  Recommend PET CT Chest lung CA initial.   Patient will be notified of results as they become available.   Referral given to medical oncology as needed.

## 2024-02-22 ENCOUNTER — APPOINTMENT (OUTPATIENT)
Dept: CARDIOLOGY | Facility: HOSPITAL | Age: 74
End: 2024-02-22
Payer: MEDICARE

## 2024-02-22 ENCOUNTER — APPOINTMENT (OUTPATIENT)
Dept: RADIOLOGY | Facility: HOSPITAL | Age: 74
End: 2024-02-22
Payer: MEDICARE

## 2024-02-22 ENCOUNTER — HOSPITAL ENCOUNTER (OUTPATIENT)
Facility: HOSPITAL | Age: 74
Setting detail: OBSERVATION
Discharge: NURSING, REHAB OR LONG-TERM CARE | End: 2024-02-28
Attending: INTERNAL MEDICINE | Admitting: INTERNAL MEDICINE
Payer: MEDICARE

## 2024-02-22 DIAGNOSIS — R45.851 SUICIDAL IDEATION: ICD-10-CM

## 2024-02-22 DIAGNOSIS — Z72.0 NICOTINE ABUSE: ICD-10-CM

## 2024-02-22 DIAGNOSIS — F33.2 SEVERE EPISODE OF RECURRENT MAJOR DEPRESSIVE DISORDER, WITHOUT PSYCHOTIC FEATURES (MULTI): ICD-10-CM

## 2024-02-22 DIAGNOSIS — R07.9 CHEST PAIN, UNSPECIFIED TYPE: ICD-10-CM

## 2024-02-22 DIAGNOSIS — R42 DIZZINESS: Primary | ICD-10-CM

## 2024-02-22 DIAGNOSIS — R07.89 OTHER CHEST PAIN: ICD-10-CM

## 2024-02-22 DIAGNOSIS — K59.00 CONSTIPATION, UNSPECIFIED CONSTIPATION TYPE: ICD-10-CM

## 2024-02-22 LAB
ALBUMIN SERPL BCP-MCNC: 3.8 G/DL (ref 3.4–5)
ALP SERPL-CCNC: 53 U/L (ref 33–136)
ALT SERPL W P-5'-P-CCNC: 10 U/L (ref 10–52)
AMPHETAMINES UR QL SCN: NORMAL
ANION GAP SERPL CALC-SCNC: 9 MMOL/L (ref 10–20)
APAP SERPL-MCNC: <10 UG/ML
APPEARANCE UR: ABNORMAL
AST SERPL W P-5'-P-CCNC: 14 U/L (ref 9–39)
BARBITURATES UR QL SCN: NORMAL
BASOPHILS # BLD AUTO: 0.04 X10*3/UL (ref 0–0.1)
BASOPHILS NFR BLD AUTO: 0.6 %
BENZODIAZ UR QL SCN: NORMAL
BILIRUB SERPL-MCNC: 0.5 MG/DL (ref 0–1.2)
BILIRUB UR STRIP.AUTO-MCNC: NEGATIVE MG/DL
BNP SERPL-MCNC: 32 PG/ML (ref 0–99)
BUN SERPL-MCNC: 15 MG/DL (ref 6–23)
BZE UR QL SCN: NORMAL
CALCIUM SERPL-MCNC: 9.3 MG/DL (ref 8.6–10.3)
CANNABINOIDS UR QL SCN: NORMAL
CAOX CRY #/AREA UR COMP ASSIST: ABNORMAL /HPF
CARDIAC TROPONIN I PNL SERPL HS: 7 NG/L (ref 0–20)
CHLORIDE SERPL-SCNC: 105 MMOL/L (ref 98–107)
CO2 SERPL-SCNC: 27 MMOL/L (ref 21–32)
COLOR UR: ABNORMAL
CREAT SERPL-MCNC: 0.7 MG/DL (ref 0.5–1.3)
EGFRCR SERPLBLD CKD-EPI 2021: >90 ML/MIN/1.73M*2
EOSINOPHIL # BLD AUTO: 0.08 X10*3/UL (ref 0–0.4)
EOSINOPHIL NFR BLD AUTO: 1.2 %
ERYTHROCYTE [DISTWIDTH] IN BLOOD BY AUTOMATED COUNT: 15 % (ref 11.5–14.5)
ETHANOL SERPL-MCNC: <10 MG/DL
FENTANYL+NORFENTANYL UR QL SCN: NORMAL
FLUAV RNA RESP QL NAA+PROBE: NOT DETECTED
FLUBV RNA RESP QL NAA+PROBE: NOT DETECTED
GLUCOSE SERPL-MCNC: 86 MG/DL (ref 74–99)
GLUCOSE UR STRIP.AUTO-MCNC: NEGATIVE MG/DL
HCT VFR BLD AUTO: 36.4 % (ref 41–52)
HGB BLD-MCNC: 12.4 G/DL (ref 13.5–17.5)
IMM GRANULOCYTES # BLD AUTO: 0.01 X10*3/UL (ref 0–0.5)
IMM GRANULOCYTES NFR BLD AUTO: 0.1 % (ref 0–0.9)
KETONES UR STRIP.AUTO-MCNC: NEGATIVE MG/DL
LACTATE SERPL-SCNC: 0.7 MMOL/L (ref 0.4–2)
LEUKOCYTE ESTERASE UR QL STRIP.AUTO: NEGATIVE
LYMPHOCYTES # BLD AUTO: 1.51 X10*3/UL (ref 0.8–3)
LYMPHOCYTES NFR BLD AUTO: 22.3 %
MAGNESIUM SERPL-MCNC: 2.07 MG/DL (ref 1.6–2.4)
MCH RBC QN AUTO: 32 PG (ref 26–34)
MCHC RBC AUTO-ENTMCNC: 34.1 G/DL (ref 32–36)
MCV RBC AUTO: 94 FL (ref 80–100)
MONOCYTES # BLD AUTO: 0.6 X10*3/UL (ref 0.05–0.8)
MONOCYTES NFR BLD AUTO: 8.9 %
MUCOUS THREADS #/AREA URNS AUTO: ABNORMAL /LPF
NEUTROPHILS # BLD AUTO: 4.53 X10*3/UL (ref 1.6–5.5)
NEUTROPHILS NFR BLD AUTO: 66.9 %
NITRITE UR QL STRIP.AUTO: NEGATIVE
NRBC BLD-RTO: 0 /100 WBCS (ref 0–0)
OPIATES UR QL SCN: NORMAL
OXYCODONE+OXYMORPHONE UR QL SCN: NORMAL
PCP UR QL SCN: NORMAL
PH UR STRIP.AUTO: 5 [PH]
PLATELET # BLD AUTO: 258 X10*3/UL (ref 150–450)
POTASSIUM SERPL-SCNC: 3.7 MMOL/L (ref 3.5–5.3)
PROT SERPL-MCNC: 6.1 G/DL (ref 6.4–8.2)
PROT UR STRIP.AUTO-MCNC: ABNORMAL MG/DL
RBC # BLD AUTO: 3.87 X10*6/UL (ref 4.5–5.9)
RBC # UR STRIP.AUTO: NEGATIVE /UL
RBC #/AREA URNS AUTO: ABNORMAL /HPF
RSV RNA RESP QL NAA+PROBE: NOT DETECTED
SALICYLATES SERPL-MCNC: <3 MG/DL
SARS-COV-2 RNA RESP QL NAA+PROBE: NOT DETECTED
SODIUM SERPL-SCNC: 137 MMOL/L (ref 136–145)
SP GR UR STRIP.AUTO: 1.03
UROBILINOGEN UR STRIP.AUTO-MCNC: 2 MG/DL
WBC # BLD AUTO: 6.8 X10*3/UL (ref 4.4–11.3)
WBC #/AREA URNS AUTO: ABNORMAL /HPF

## 2024-02-22 PROCEDURE — 36415 COLL VENOUS BLD VENIPUNCTURE: CPT | Performed by: INTERNAL MEDICINE

## 2024-02-22 PROCEDURE — 80143 DRUG ASSAY ACETAMINOPHEN: CPT | Performed by: INTERNAL MEDICINE

## 2024-02-22 PROCEDURE — 83880 ASSAY OF NATRIURETIC PEPTIDE: CPT | Performed by: INTERNAL MEDICINE

## 2024-02-22 PROCEDURE — 87637 SARSCOV2&INF A&B&RSV AMP PRB: CPT | Performed by: INTERNAL MEDICINE

## 2024-02-22 PROCEDURE — G0378 HOSPITAL OBSERVATION PER HR: HCPCS

## 2024-02-22 PROCEDURE — 84075 ASSAY ALKALINE PHOSPHATASE: CPT | Performed by: INTERNAL MEDICINE

## 2024-02-22 PROCEDURE — 93005 ELECTROCARDIOGRAM TRACING: CPT

## 2024-02-22 PROCEDURE — 81001 URINALYSIS AUTO W/SCOPE: CPT | Mod: 59 | Performed by: INTERNAL MEDICINE

## 2024-02-22 PROCEDURE — 71046 X-RAY EXAM CHEST 2 VIEWS: CPT | Mod: FOREIGN READ | Performed by: RADIOLOGY

## 2024-02-22 PROCEDURE — 96375 TX/PRO/DX INJ NEW DRUG ADDON: CPT

## 2024-02-22 PROCEDURE — 84484 ASSAY OF TROPONIN QUANT: CPT | Performed by: INTERNAL MEDICINE

## 2024-02-22 PROCEDURE — 85025 COMPLETE CBC W/AUTO DIFF WBC: CPT | Performed by: INTERNAL MEDICINE

## 2024-02-22 PROCEDURE — 82746 ASSAY OF FOLIC ACID SERUM: CPT | Mod: PARLAB | Performed by: STUDENT IN AN ORGANIZED HEALTH CARE EDUCATION/TRAINING PROGRAM

## 2024-02-22 PROCEDURE — 99285 EMERGENCY DEPT VISIT HI MDM: CPT | Mod: 25

## 2024-02-22 PROCEDURE — 2500000001 HC RX 250 WO HCPCS SELF ADMINISTERED DRUGS (ALT 637 FOR MEDICARE OP): Performed by: INTERNAL MEDICINE

## 2024-02-22 PROCEDURE — 2500000004 HC RX 250 GENERAL PHARMACY W/ HCPCS (ALT 636 FOR OP/ED): Performed by: INTERNAL MEDICINE

## 2024-02-22 PROCEDURE — 80307 DRUG TEST PRSMV CHEM ANLYZR: CPT | Performed by: INTERNAL MEDICINE

## 2024-02-22 PROCEDURE — 83605 ASSAY OF LACTIC ACID: CPT | Performed by: INTERNAL MEDICINE

## 2024-02-22 PROCEDURE — 71046 X-RAY EXAM CHEST 2 VIEWS: CPT

## 2024-02-22 PROCEDURE — 83735 ASSAY OF MAGNESIUM: CPT | Performed by: INTERNAL MEDICINE

## 2024-02-22 PROCEDURE — 96374 THER/PROPH/DIAG INJ IV PUSH: CPT

## 2024-02-22 PROCEDURE — 99223 1ST HOSP IP/OBS HIGH 75: CPT | Performed by: NURSE PRACTITIONER

## 2024-02-22 RX ORDER — MECLIZINE HYDROCHLORIDE 25 MG/1
25 TABLET ORAL ONCE
Status: COMPLETED | OUTPATIENT
Start: 2024-02-22 | End: 2024-02-22

## 2024-02-22 RX ORDER — ACETAMINOPHEN 325 MG/1
650 TABLET ORAL EVERY 4 HOURS PRN
Status: DISCONTINUED | OUTPATIENT
Start: 2024-02-22 | End: 2024-02-28 | Stop reason: HOSPADM

## 2024-02-22 RX ORDER — IBUPROFEN 200 MG
1 TABLET ORAL DAILY
Status: DISCONTINUED | OUTPATIENT
Start: 2024-04-05 | End: 2024-02-28 | Stop reason: HOSPADM

## 2024-02-22 RX ORDER — ONDANSETRON HYDROCHLORIDE 2 MG/ML
4 INJECTION, SOLUTION INTRAVENOUS ONCE
Status: COMPLETED | OUTPATIENT
Start: 2024-02-22 | End: 2024-02-22

## 2024-02-22 RX ORDER — ENOXAPARIN SODIUM 100 MG/ML
40 INJECTION SUBCUTANEOUS EVERY 24 HOURS
Status: DISCONTINUED | OUTPATIENT
Start: 2024-02-23 | End: 2024-02-28 | Stop reason: HOSPADM

## 2024-02-22 RX ORDER — GUAIFENESIN 600 MG/1
600 TABLET, EXTENDED RELEASE ORAL EVERY 12 HOURS PRN
Status: DISCONTINUED | OUTPATIENT
Start: 2024-02-22 | End: 2024-02-28 | Stop reason: HOSPADM

## 2024-02-22 RX ORDER — ALBUTEROL SULFATE 0.83 MG/ML
2.5 SOLUTION RESPIRATORY (INHALATION) EVERY 4 HOURS PRN
Status: DISCONTINUED | OUTPATIENT
Start: 2024-02-22 | End: 2024-02-26

## 2024-02-22 RX ORDER — ACETAMINOPHEN 160 MG/5ML
650 SOLUTION ORAL EVERY 4 HOURS PRN
Status: DISCONTINUED | OUTPATIENT
Start: 2024-02-22 | End: 2024-02-23

## 2024-02-22 RX ORDER — NICOTINE 7MG/24HR
1 PATCH, TRANSDERMAL 24 HOURS TRANSDERMAL DAILY
Status: DISCONTINUED | OUTPATIENT
Start: 2024-04-19 | End: 2024-02-28 | Stop reason: HOSPADM

## 2024-02-22 RX ORDER — ASPIRIN 81 MG/1
81 TABLET ORAL DAILY
Status: DISCONTINUED | OUTPATIENT
Start: 2024-02-23 | End: 2024-02-23

## 2024-02-22 RX ORDER — IBUPROFEN 200 MG
1 TABLET ORAL DAILY
Status: DISCONTINUED | OUTPATIENT
Start: 2024-02-23 | End: 2024-02-28 | Stop reason: HOSPADM

## 2024-02-22 RX ORDER — MICONAZOLE NITRATE 2 %
2 CREAM (GRAM) TOPICAL EVERY 2 HOUR PRN
Status: DISCONTINUED | OUTPATIENT
Start: 2024-02-22 | End: 2024-02-28 | Stop reason: HOSPADM

## 2024-02-22 RX ORDER — KETOROLAC TROMETHAMINE 30 MG/ML
15 INJECTION, SOLUTION INTRAMUSCULAR; INTRAVENOUS ONCE
Status: COMPLETED | OUTPATIENT
Start: 2024-02-22 | End: 2024-02-22

## 2024-02-22 RX ORDER — POLYETHYLENE GLYCOL 3350 17 G/17G
17 POWDER, FOR SOLUTION ORAL DAILY
Status: DISCONTINUED | OUTPATIENT
Start: 2024-02-23 | End: 2024-02-28 | Stop reason: HOSPADM

## 2024-02-22 RX ORDER — IPRATROPIUM BROMIDE AND ALBUTEROL SULFATE 2.5; .5 MG/3ML; MG/3ML
3 SOLUTION RESPIRATORY (INHALATION)
Status: DISCONTINUED | OUTPATIENT
Start: 2024-02-23 | End: 2024-02-23

## 2024-02-22 RX ORDER — ACETAMINOPHEN 650 MG/1
650 SUPPOSITORY RECTAL EVERY 4 HOURS PRN
Status: DISCONTINUED | OUTPATIENT
Start: 2024-02-22 | End: 2024-02-28 | Stop reason: HOSPADM

## 2024-02-22 RX ADMIN — MECLIZINE HYDROCHLORIDE 25 MG: 25 TABLET ORAL at 14:13

## 2024-02-22 RX ADMIN — KETOROLAC TROMETHAMINE 15 MG: 30 INJECTION, SOLUTION INTRAMUSCULAR; INTRAVENOUS at 19:03

## 2024-02-22 RX ADMIN — ONDANSETRON 4 MG: 2 INJECTION INTRAMUSCULAR; INTRAVENOUS at 14:13

## 2024-02-22 SDOH — HEALTH STABILITY: MENTAL HEALTH: HAVE YOU EVER DONE ANYTHING, STARTED TO DO ANYTHING, OR PREPARED TO DO ANYTHING TO END YOUR LIFE?: YES

## 2024-02-22 SDOH — HEALTH STABILITY: MENTAL HEALTH: WISH TO BE DEAD (PAST 1 MONTH): NO

## 2024-02-22 SDOH — HEALTH STABILITY: MENTAL HEALTH: BEHAVIORS/MOOD: AGITATED;FLAT AFFECT;GUARDED;PARANOID

## 2024-02-22 SDOH — HEALTH STABILITY: MENTAL HEALTH: HAVE YOU HAD THESE THOUGHTS AND HAD SOME INTENTION OF ACTING ON THEM?: YES

## 2024-02-22 SDOH — HEALTH STABILITY: MENTAL HEALTH
HAVE YOU STARTED TO WORK OUT OR WORKED OUT THE DETAILS OF HOW TO KILL YOURSELF? DO YOU INTENT TO CARRY OUT THIS PLAN?: YES

## 2024-02-22 SDOH — HEALTH STABILITY: MENTAL HEALTH

## 2024-02-22 SDOH — HEALTH STABILITY: MENTAL HEALTH: WAS THIS WITHIN THE PAST THREE MONTHS?: YES

## 2024-02-22 SDOH — SOCIAL STABILITY: SOCIAL INSECURITY: HAVE YOU HAD THOUGHTS OF HARMING ANYONE ELSE?: NO

## 2024-02-22 SDOH — HEALTH STABILITY: MENTAL HEALTH: SUICIDE ASSESSMENT: ADULT (C-SSRS)

## 2024-02-22 SDOH — HEALTH STABILITY: MENTAL HEALTH: IN THE PAST WEEK, HAVE YOU BEEN HAVING THOUGHTS ABOUT KILLING YOURSELF?: NO

## 2024-02-22 SDOH — HEALTH STABILITY: MENTAL HEALTH: HAVE YOU BEEN THINKING ABOUT HOW YOU MIGHT DO THIS?: NO

## 2024-02-22 SDOH — HEALTH STABILITY: MENTAL HEALTH: NEEDS EXPRESSED: DENIES

## 2024-02-22 SDOH — HEALTH STABILITY: MENTAL HEALTH: DELUSIONS: CONTROLLED

## 2024-02-22 SDOH — SOCIAL STABILITY: SOCIAL INSECURITY: FAMILY BEHAVIORS: COOPERATIVE

## 2024-02-22 SDOH — SOCIAL STABILITY: SOCIAL NETWORK: VISITOR BEHAVIORS: APPROPRIATE FOR SITUATION

## 2024-02-22 SDOH — SOCIAL STABILITY: SOCIAL INSECURITY: HAS ANYONE EVER THREATENED TO HURT YOUR FAMILY OR YOUR PETS?: NO

## 2024-02-22 SDOH — SOCIAL STABILITY: SOCIAL INSECURITY: DOES ANYONE TRY TO KEEP YOU FROM HAVING/CONTACTING OTHER FRIENDS OR DOING THINGS OUTSIDE YOUR HOME?: NO

## 2024-02-22 SDOH — HEALTH STABILITY: MENTAL HEALTH: HAVE YOU WISHED YOU WERE DEAD OR WISHED YOU COULD GO TO SLEEP AND NOT WAKE UP?: YES

## 2024-02-22 SDOH — HEALTH STABILITY: MENTAL HEALTH: HAVE YOU EVER TRIED TO KILL YOURSELF?: NO

## 2024-02-22 SDOH — HEALTH STABILITY: MENTAL HEALTH: NON-SPECIFIC ACTIVE SUICIDAL THOUGHTS (PAST 1 MONTH): NO

## 2024-02-22 SDOH — HEALTH STABILITY: MENTAL HEALTH: IN THE PAST FEW WEEKS, HAVE YOU FELT THAT YOU OR YOUR FAMILY WOULD BE BETTER OFF IF YOU WERE DEAD?: NO

## 2024-02-22 SDOH — HEALTH STABILITY: MENTAL HEALTH: ANXIETY SYMPTOMS: GENERALIZED;CHEST PAIN;PALPITATIONS

## 2024-02-22 SDOH — HEALTH STABILITY: MENTAL HEALTH: RISK OF SUICIDE: HIGH RISK

## 2024-02-22 SDOH — ECONOMIC STABILITY: HOUSING INSECURITY: FEELS SAFE LIVING IN HOME: YES

## 2024-02-22 SDOH — HEALTH STABILITY: MENTAL HEALTH: HAVE YOU ACTUALLY HAD ANY THOUGHTS OF KILLING YOURSELF?: YES

## 2024-02-22 SDOH — HEALTH STABILITY: MENTAL HEALTH: SUICIDAL BEHAVIOR (LIFETIME): NO

## 2024-02-22 SDOH — HEALTH STABILITY: MENTAL HEALTH: ARE YOU HAVING THOUGHTS OF KILLING YOURSELF RIGHT NOW?: NO

## 2024-02-22 SDOH — HEALTH STABILITY: MENTAL HEALTH: DEPRESSION SYMPTOMS: APPETITE CHANGE

## 2024-02-22 SDOH — SOCIAL STABILITY: SOCIAL INSECURITY: ARE THERE ANY APPARENT SIGNS OF INJURIES/BEHAVIORS THAT COULD BE RELATED TO ABUSE/NEGLECT?: NO

## 2024-02-22 SDOH — HEALTH STABILITY: MENTAL HEALTH: IN THE PAST FEW WEEKS, HAVE YOU WISHED YOU WERE DEAD?: NO

## 2024-02-22 SDOH — SOCIAL STABILITY: SOCIAL INSECURITY: ARE YOU OR HAVE YOU BEEN THREATENED OR ABUSED PHYSICALLY, EMOTIONALLY, OR SEXUALLY BY ANYONE?: NO

## 2024-02-22 SDOH — HEALTH STABILITY: MENTAL HEALTH: BEHAVIORS/MOOD: CALM;RESTLESS

## 2024-02-22 SDOH — SOCIAL STABILITY: SOCIAL INSECURITY: DO YOU FEEL UNSAFE GOING BACK TO THE PLACE WHERE YOU ARE LIVING?: NO

## 2024-02-22 SDOH — SOCIAL STABILITY: SOCIAL INSECURITY: ABUSE: ADULT

## 2024-02-22 SDOH — SOCIAL STABILITY: SOCIAL INSECURITY: DO YOU FEEL ANYONE HAS EXPLOITED OR TAKEN ADVANTAGE OF YOU FINANCIALLY OR OF YOUR PERSONAL PROPERTY?: NO

## 2024-02-22 ASSESSMENT — PAIN DESCRIPTION - PROGRESSION
CLINICAL_PROGRESSION: RAPIDLY IMPROVING
CLINICAL_PROGRESSION: NOT CHANGED

## 2024-02-22 ASSESSMENT — ACTIVITIES OF DAILY LIVING (ADL)
GROOMING: NEEDS ASSISTANCE
ASSISTIVE_DEVICE: WALKER;CANE;SHOWER CHAIR
PATIENT'S MEMORY ADEQUATE TO SAFELY COMPLETE DAILY ACTIVITIES?: YES
BATHING: NEEDS ASSISTANCE
DRESSING YOURSELF: NEEDS ASSISTANCE
FEEDING YOURSELF: INDEPENDENT
HEARING - LEFT EAR: FUNCTIONAL
ADEQUATE_TO_COMPLETE_ADL: YES
HEARING - RIGHT EAR: FUNCTIONAL
JUDGMENT_ADEQUATE_SAFELY_COMPLETE_DAILY_ACTIVITIES: YES
TOILETING: NEEDS ASSISTANCE
WALKS IN HOME: NEEDS ASSISTANCE
LACK_OF_TRANSPORTATION: NO

## 2024-02-22 ASSESSMENT — LIFESTYLE VARIABLES
HOW OFTEN DO YOU HAVE 6 OR MORE DRINKS ON ONE OCCASION: NEVER
AUDIT-C TOTAL SCORE: 0
EVER HAD A DRINK FIRST THING IN THE MORNING TO STEADY YOUR NERVES TO GET RID OF A HANGOVER: NO
HAVE YOU EVER FELT YOU SHOULD CUT DOWN ON YOUR DRINKING: NO
HOW OFTEN DO YOU HAVE A DRINK CONTAINING ALCOHOL: NEVER
PRESCIPTION_ABUSE_PAST_12_MONTHS: NO
SUBSTANCE_ABUSE_PAST_12_MONTHS: NO
HOW MANY STANDARD DRINKS CONTAINING ALCOHOL DO YOU HAVE ON A TYPICAL DAY: PATIENT DOES NOT DRINK
EVER FELT BAD OR GUILTY ABOUT YOUR DRINKING: NO
SKIP TO QUESTIONS 9-10: 1
HAVE PEOPLE ANNOYED YOU BY CRITICIZING YOUR DRINKING: NO
AUDIT-C TOTAL SCORE: 0

## 2024-02-22 ASSESSMENT — COLUMBIA-SUICIDE SEVERITY RATING SCALE - C-SSRS
1. SINCE LAST CONTACT, HAVE YOU WISHED YOU WERE DEAD OR WISHED YOU COULD GO TO SLEEP AND NOT WAKE UP?: YES
1. IN THE PAST MONTH, HAVE YOU WISHED YOU WERE DEAD OR WISHED YOU COULD GO TO SLEEP AND NOT WAKE UP?: NO
2. HAVE YOU ACTUALLY HAD ANY THOUGHTS OF KILLING YOURSELF?: NO
6. HAVE YOU EVER DONE ANYTHING, STARTED TO DO ANYTHING, OR PREPARED TO DO ANYTHING TO END YOUR LIFE?: NO
6. HAVE YOU EVER DONE ANYTHING, STARTED TO DO ANYTHING, OR PREPARED TO DO ANYTHING TO END YOUR LIFE?: NO

## 2024-02-22 ASSESSMENT — COGNITIVE AND FUNCTIONAL STATUS - GENERAL
PERSONAL GROOMING: A LITTLE
MOBILITY SCORE: 19
TOILETING: A LITTLE
TURNING FROM BACK TO SIDE WHILE IN FLAT BAD: A LITTLE
HELP NEEDED FOR BATHING: A LITTLE
DRESSING REGULAR UPPER BODY CLOTHING: A LITTLE
WALKING IN HOSPITAL ROOM: A LITTLE
CLIMB 3 TO 5 STEPS WITH RAILING: A LITTLE
DRESSING REGULAR LOWER BODY CLOTHING: A LITTLE
DAILY ACTIVITIY SCORE: 19
STANDING UP FROM CHAIR USING ARMS: A LITTLE
PATIENT BASELINE BEDBOUND: NO
MOVING FROM LYING ON BACK TO SITTING ON SIDE OF FLAT BED WITH BEDRAILS: A LITTLE

## 2024-02-22 ASSESSMENT — PAIN - FUNCTIONAL ASSESSMENT
PAIN_FUNCTIONAL_ASSESSMENT: 0-10
PAIN_FUNCTIONAL_ASSESSMENT: 0-10

## 2024-02-22 ASSESSMENT — PATIENT HEALTH QUESTIONNAIRE - PHQ9
SUM OF ALL RESPONSES TO PHQ9 QUESTIONS 1 & 2: 0
1. LITTLE INTEREST OR PLEASURE IN DOING THINGS: NOT AT ALL
2. FEELING DOWN, DEPRESSED OR HOPELESS: NOT AT ALL

## 2024-02-22 ASSESSMENT — PAIN SCALES - GENERAL
PAINLEVEL_OUTOF10: 0 - NO PAIN
PAINLEVEL_OUTOF10: 7

## 2024-02-22 NOTE — ED PROVIDER NOTES
HPI   Chief Complaint   Patient presents with    Weakness, Gen    Dizziness       Patient presented for evaluation of dizziness, cough, chest pain.  Patient states he has been dizzy with the room spinning sensation on and off for the last week.  Patient states is worse when standing.  Patient also notes a nonproductive cough.  Patient states he is developing chest pain when he coughs.  Patient states he has chest pain but is not coughing.  Patient notes he was evaluated here and was recommended to be admitted.  Patient did not wish to be admitted at that time.  Patient notes having rhinorrhea and sore throat.      History provided by:  Patient                      Sydnie Coma Scale Score: 15                     Patient History   Past Medical History:   Diagnosis Date    Abdominal pain 02/19/2024    Abnormal weight loss 02/19/2024    Activity, unspecified 02/19/2024    Acute encephalopathy 02/19/2024    Age-related nuclear cataract, bilateral 02/19/2024    Age-related physical debility 02/19/2024    Atherosclerosis of artery of left lower extremity (CMS/HCC) 02/19/2024    Atherosclerosis of native arteries of extremities with rest pain, left leg (CMS/[facility]) 02/19/2024    Benign prostatic hyperplasia 05/02/2002    Carbuncle 02/19/2024    Cellulitis 02/18/2023    Cervical radiculopathy 02/19/2024    Chronic obstructive pulmonary disease (CMS/[facility]) 02/19/2024    Chronic pain 02/19/2024    Constipation 02/19/2024    Degeneration of cervical intervertebral disc 02/19/2024    Delusional disorder (CMS/[facility]) 02/19/2024    Dementia (CMS/[facility]) 02/19/2024    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 (CMS/[facility]) 02/19/2024    Dementia with behavioral disturbance (CMS/[facility]) 02/19/2024    Depressive disorder 05/02/2002    Financial insecurity 02/19/2024    Gangrene (CMS/[facility]) 02/19/2024    Gastritis due to Helicobacter species 02/19/2024    Hematemesis 02/19/2024    High cholesterol     Hyperammonemia (CMS/[facility]) 02/19/2024     Hyperlipidemia 02/19/2024    Hyperlipidemia 02/19/2024    Hypertension     Hypokalemia 02/19/2024    Hyponatremia 02/19/2024    Impotence of organic origin 02/19/2024    Low back pain 02/19/2024    Low back pain, unspecified 02/19/2024    Lumbar radiculopathy 02/19/2024    Lumbar spondylosis 02/19/2024    Lumbar stenosis with neurogenic claudication 02/19/2024    Macular degeneration 02/19/2024    Mild cognitive impairment of uncertain or unknown etiology 02/19/2024    Mixed hyperlipidemia 05/02/2002    Morbid obesity (CMS/[facility]) 05/02/2002    Multiple nodules of lung 02/19/2024    Neurosyphilis 02/19/2024    Nicotine dependence 02/19/2024    Nicotine dependence, unspecified, uncomplicated 02/19/2024    Noncompliance with treatment 02/19/2024    Obesity 02/19/2024    Obesity with body mass index 30 or greater 02/19/2024    PAD (peripheral artery disease) (CMS/[facility]) 02/24/2023    Polyp of colon 02/19/2024    Primary hypertension 02/19/2024    Prolonged Q-T interval on ECG 02/19/2024    Proteinuria 02/19/2024    Psychosis (CMS/[facility]) 02/19/2024    Psychosis, paranoid, involutional (CMS/[facility]) 02/19/2024    Radiculopathy, lumbar region 02/19/2024    Rash and other nonspecific skin eruption 02/19/2024    Subacute subdural hematoma (CMS/[facility]) 02/19/2024    Subdural hemorrhage (CMS/[facility]) 02/19/2024    Supraventricular tachycardia 02/19/2024    Tobacco use disorder 05/02/2002    Unspecified dementia, moderate, with anxiety (CMS/[facility]) 02/19/2024    Unspecified dementia, unspecified severity, with agitation (CMS/[facility]) 02/19/2024    Urinary obstruction 02/19/2024     Past Surgical History:   Procedure Laterality Date    TOE AMPUTATION       Family History   Problem Relation Name Age of Onset    Heart disease Mother       Social History     Tobacco Use    Smoking status: Every Day     Packs/day: 1     Types: Cigarettes    Smokeless tobacco: Never   Substance Use Topics    Alcohol use: Not Currently    Drug use: Never       Physical Exam    ED Triage Vitals   Temp Pulse Resp BP   -- -- -- --      SpO2 Temp src Heart Rate Source Patient Position   -- -- -- --      BP Location FiO2 (%)     -- --       Physical Exam  Vitals and nursing note reviewed.   Constitutional:       Appearance: Normal appearance.   HENT:      Head: Atraumatic.      Right Ear: External ear normal.      Left Ear: External ear normal.      Nose: Nose normal.      Mouth/Throat:      Mouth: Mucous membranes are moist.      Pharynx: Oropharynx is clear.   Eyes:      Extraocular Movements: Extraocular movements intact.      Right eye: Nystagmus present.      Left eye: Nystagmus present.      Pupils: Pupils are equal, round, and reactive to light.      Comments: Bilateral nystagmus to the right   Cardiovascular:      Rate and Rhythm: Normal rate and regular rhythm.      Pulses: Normal pulses.   Pulmonary:      Effort: Pulmonary effort is normal.      Breath sounds: Examination of the right-lower field reveals decreased breath sounds. Examination of the left-lower field reveals decreased breath sounds. Decreased breath sounds present.   Abdominal:      Palpations: Abdomen is soft.      Tenderness: There is no abdominal tenderness.   Musculoskeletal:         General: No tenderness or signs of injury. Normal range of motion.      Cervical back: Normal range of motion and neck supple. No rigidity or tenderness.   Skin:     General: Skin is warm and dry.   Neurological:      General: No focal deficit present.      Mental Status: He is alert and oriented to person, place, and time. Mental status is at baseline.      Cranial Nerves: Cranial nerves 2-12 are intact.      Sensory: Sensation is intact.      Motor: Weakness present.      Coordination: Coordination is intact.      Comments: Weakness in the left lower extremity on exam.  Patient indicates weakness to the left lower extremity is chronic.   Psychiatric:         Mood and Affect: Affect is blunt.         Thought Content: Thought content  includes suicidal ideation.         Cognition and Memory: Cognition is impaired.         ED Course & MDM   ED Course as of 02/25/24 1945   Thu Feb 22, 2024   1546 Reevaluated patient and updated him with findings.  Patient indicates he has concern for going home as he has thoughts of hurting himself.  Patient states he has left the oven on.  Patient is unclear as to whether or not he is left off and on in an effort to harm himself.  Patient no suicidal thoughts but denies specific plan at this time.  Patient denies homicidal ideation.  Patient denies hearing voices. [JA]      ED Course User Index  [JA] Sahil Le DO         Diagnoses as of 02/25/24 1945   Dizziness   Chest pain, unspecified type   Suicidal ideation       Medical Decision Making  Differential diagnosis: Vertigo, MI, CVA, infection, PE, suicidal ideation, other    Patient is medically cleared for behavioral health evaluation.    Patient initially been evaluated for dizziness.  Patient states symptoms been on and off for extended period of time.  Patient notes they have been present for the last week but he has had dizziness on and off for months.  On neuroexam patient does have weakness to left lower extremity.  Patient indicates this is chronic weakness of the lower extremity.  Patient denies any new neurodeficit.  No other focal neurodeficits found on exam.  Symptoms improved in the ED.  EKG appears nonischemic.  Troponin negative.  On reevaluation patient has concerned that he wishes to be with his wife who is a nurse facility.  Patient indicates he is having trouble take care of himself at home and is having thoughts of wanting to hurt himself.  That consulted for further evaluation.  [facility]            Procedure  Procedures     Sahil Le DO  02/25/24 1945

## 2024-02-22 NOTE — ED TRIAGE NOTES
"Pt presents to ED for reports of increased generalized weakness for the past few weeks as well as dizziness for the past few weeks. Pt states he was recently admitted but refused to stay in the hospital. Pt reports mild cough. Reports numbness in bilateral hands that has been present \"for a long time.\" Pt denies any new/worsening symptoms.  "

## 2024-02-22 NOTE — PROGRESS NOTES
EPAT - Social Work Psychiatric Assessment    Arrival Details  Mode of Arrival: Ambulatory  Admission Source: Home  Admission Type: Voluntary  EPAT Assessment Start Date: 02/22/24  EPAT Assessment Start Time: 1650  Name of : Perry Qureshi    History of Present Illness  Admission Reason: Depression/suicidal thoughts  HPI: Patient is a 73 year old male who presented with chest pain and health concerns. He made suicidal statements to the provider which prompted the referral. During the assessment the patient denied any suicidal thoughts or history of self harm. He is low risk according to the Triage Note. The patient denied any thoughts of harming others and did not have any hallucinations. His stressors are living alone and his wife in a nursing home.    SW Readmission Information   Readmission within 30 Days: No    Psychiatric Symptoms  Anxiety Symptoms: Generalized, Chest pain, Palpitations  Depression Symptoms: Appetite change (loss of weight)  Mireya Symptoms: No problems reported or observed.    Psychosis Symptoms  Hallucination Type: No problems reported or observed.  Delusion Type: No problems reported or observed.    Additional Symptoms - Adult  Generalized Anxiety Disorder: Difficult to control worry, Excessive anxiety/worry  Obsessive Compulsive Disorder: No problems reported or observed.  Panic Attack: No problems reported or observed.  Post Traumatic Stress Disorder: No problems reported or observed.  Delirium: No problems reported or observed.  Review of Symptoms Comments: Patient is depressed and anxious. He  reports recent weight loss.    Past Psychiatric History/Meds/Treatments  Past Psychiatric History: Patient has a history of Mood Disorder, Depression and Anxiety. He currently does not have any outpatient providers. He denies any history of self harm or substance use treatment.  Past Psychiatric Meds/Treatments: none  Past Violence/Victimization History: Patient denied    Current Mental Health  Contacts   Name/Phone Number: none   Last Appointment Date: none  Provider Name/Phone Number: none  Provider Last Appointment Date: none    Support System: Other (Comment)    Living Arrangement: House    Home Safety  Feels Safe Living in Home: Yes         [place] Service/Education History  Current or Previous  Service:  (unknown)  Education Level: High school  History of Learning Problems: No  History of School Behavior Problems: No  School History: see above    Social/Cultural History  Social History: Patient is his own guardian. His stressors are his wife living in a nursing home with dementia, living alone and limited supports  Important Activities: Family    Legal  Legal Concerns: none    Drug Screening  Have you used any substances (canabis, cocaine, heroin, hallucinogens, inhalants, etc.) in the past 12 months?: No  Have you used any prescription drugs other than prescribed in the past 12 months?: No  Is a toxicology screen needed?: No         Psychosocial  Psychosocial (WDL): Exceptions to WDL  Behaviors/Mood: Anxious, Cooperative, Withdrawn  Affect: Appropriate to circumstances  Parent/Guardian/Significant Other Involvement: No involvement    Orientation  Orientation Level: Oriented X4    General Appearance  Motor Activity: Unsteady  Speech Pattern: Other (Comment)  General Attitude: Cooperative  Appearance/Hygiene: Unremarkable    Thought Process  Coherency: Other (Comment)  Content: Unremarkable  Delusions: Other (Comment)  Perception: Not altered  Hallucination: None  Judgment/Insight: Limited  Confusion: None  Cognition: Unable to assess    Sleep Pattern  Sleep Pattern: Other (Comment)    Risk Factors  Self Harm/Suicidal Ideation Plan: Passive SI stated to staff  Previous Self Harm/Suicidal Plans: Patient denied  Risk Factors: None  Description of Thoughts/Ideas Leaving Unit Now: n/a    Violence Risk Assessment  Assessment of Violence: None noted  Thoughts of Harm to  Others: No    Ability to Assess Risk Screen  Risk Screen - Ability to Assess: Able to be screened  Ask Suicide-Screening Questions  1. In the past few weeks, have you wished you were dead?: No  2. In the past few weeks, have you felt that you or your family would be better off if you were dead?: No  3. In the past week, have you been having thoughts about killing yourself?: No  4. Have you ever tried to kill yourself?: No  5. Are you having thoughts of killing yourself right now?: No  Calculated Risk Score: No intervention is necessary  Yancey Suicide Severity Rating Scale (Screener/Recent Self-Report)  1. Wish to be Dead (Past 1 Month): No  2. Non-Specific Active Suicidal Thoughts (Past 1 Month): No  6. Suicidal Behavior (Lifetime): No  Calculated C-SSRS Risk Score (Lifetime/Recent): No Risk Indicated  Step 1: Risk Factors  Current & Past Psychiatric Dx: Mood disorder  Presenting Symptoms: Anxiety and/or panic  Precipitants/Stressors: Triggering events leading to humiliation, shame, and/or despair (e.g. loss of relationship, financial or health status) (real or anticipated)  Change in Treatment: Non-compliant or not receiving treatment  Access to Lethal Methods : No  Step 2: Protective Factors   Protective Factors Internal: Ability to cope with stress, Identifies reasons for living  Protective Factors External: Cultural, spiritual and/or moral attitudes against suicide  Step 3: Suicidal Ideation Intensity  How Many Times Have You Had These Thoughts: Less than once a week  When You Have the Thoughts How Long do They Last : Fleeting - few seconds or minutes  Could/Can You Stop Thinking About Killing Yourself or Wanting to Die if You Want to: Does not attempt to control thoughts  Are There Things - Anyone or Anything - That Stopped You From Wanting to Die or Acting on: Does not apply  What Sort of Reasons Did You Have For Thinking About Wanting to Die or Killing Yourself: Does not apply  Total Score: 2  Step 5:  "Documentation  Risk Level: Low suicide risk (Patient is low risk. This was discussed with Dr. Piedad Dejesus who agrees.)    Psychiatric Impression and Plan of Care  Assessment and Plan: Patient is a 73 year old male who came to the ED with chest pain. The patient was being treated and cleared. He stated then that he was having thoughts of self harm because he \"could not live alone\". The patient reports that his wife is in a SNF due to Dementia and is not coming home. He stated he wants to \"live in a SNF to\". We discussed getting him referrals to discuss future options when he clarified \" It has to be with my wife\". The patient was withdrawn. He appeared stressed and anxious. He shared he cannot cook for himself and has lost weight. He stated his wife \"did a lot for me\". He denied any thoughts of suicide during the assessment. He denied any history of suicide attempts. He also denied any homicidal thoughts or hallucinatons. The patient had good eye contact, was future oriented and help seeking.  Specific Resources Provided to Patient: patient to talk with a   CM Notified: no  PHP/IOP Recommended: none  Specific Information Provided for PHP/IOP: none  Plan Comments: none    Outcome/Disposition  Patient's Perception of Outcome Achieved: none  Assessment, Recommendations and Risk Level Reviewed with: discharge.  Contact Name: none  Contact Number(s): none  Contact Relationship: none  EPAT Assessment Completed Date: 02/22/24  EPAT Assessment Completed Time: 1725  Patient Disposition: Home      "

## 2024-02-22 NOTE — ED PROVIDER NOTES
Patient is a 73-year-old male presenting emergency department with reportedly chest pain workup was reassuring however patient began endorsing suicidal ideation.  He scented to me pending EPAT recommendations.     EPAT without recommendations for admission as patient ultimately endorsing that he wants to be placed at a nursing facility ideally the same 1 that his wife is currently staying at.  Discussed that we could admit him to evaluate for placement but cannot guarantee placement at [facility].  He is agreeable with this plan and will be admitted for evaluation for placement at facility.     Crissy Dejesus MD  02/23/24 0049

## 2024-02-23 ENCOUNTER — APPOINTMENT (OUTPATIENT)
Dept: RADIOLOGY | Facility: HOSPITAL | Age: 74
End: 2024-02-23
Payer: MEDICARE

## 2024-02-23 ENCOUNTER — APPOINTMENT (OUTPATIENT)
Dept: CARDIOLOGY | Facility: HOSPITAL | Age: 74
End: 2024-02-23
Payer: MEDICARE

## 2024-02-23 LAB
ANION GAP SERPL CALC-SCNC: 9 MMOL/L (ref 10–20)
AORTIC VALVE MEAN GRADIENT: 6 MMHG
AORTIC VALVE PEAK VELOCITY: 1.69 M/S
AV PEAK GRADIENT: 11.4 MMHG
AVA (PEAK VEL): 2.03 CM2
AVA (VTI): 2.11 CM2
BUN SERPL-MCNC: 19 MG/DL (ref 6–23)
CALCIUM SERPL-MCNC: 9 MG/DL (ref 8.6–10.3)
CHLORIDE SERPL-SCNC: 108 MMOL/L (ref 98–107)
CHOLEST SERPL-MCNC: 100 MG/DL (ref 0–199)
CHOLESTEROL/HDL RATIO: 2.7
CO2 SERPL-SCNC: 26 MMOL/L (ref 21–32)
CREAT SERPL-MCNC: 0.68 MG/DL (ref 0.5–1.3)
EGFRCR SERPLBLD CKD-EPI 2021: >90 ML/MIN/1.73M*2
EJECTION FRACTION APICAL 4 CHAMBER: 76.8
EJECTION FRACTION: 75 %
ERYTHROCYTE [DISTWIDTH] IN BLOOD BY AUTOMATED COUNT: 15.1 % (ref 11.5–14.5)
FOLATE SERPL-MCNC: >24 NG/ML
GLUCOSE SERPL-MCNC: 80 MG/DL (ref 74–99)
HCT VFR BLD AUTO: 35.3 % (ref 41–52)
HDLC SERPL-MCNC: 37.4 MG/DL
HGB BLD-MCNC: 11.7 G/DL (ref 13.5–17.5)
HOLD SPECIMEN: NORMAL
LDLC SERPL CALC-MCNC: 50 MG/DL
LEFT ATRIUM VOLUME AREA LENGTH INDEX BSA: 25.3 ML/M2
LEFT VENTRICLE INTERNAL DIMENSION DIASTOLE: 4.88 CM (ref 3.5–6)
LEFT VENTRICULAR OUTFLOW TRACT DIAMETER: 1.8 CM
MCH RBC QN AUTO: 32 PG (ref 26–34)
MCHC RBC AUTO-ENTMCNC: 33.1 G/DL (ref 32–36)
MCV RBC AUTO: 96 FL (ref 80–100)
MITRAL VALVE E/A RATIO: 1.73
NON HDL CHOLESTEROL: 63 MG/DL (ref 0–149)
NRBC BLD-RTO: 0 /100 WBCS (ref 0–0)
PLATELET # BLD AUTO: 246 X10*3/UL (ref 150–450)
POTASSIUM SERPL-SCNC: 4.2 MMOL/L (ref 3.5–5.3)
RBC # BLD AUTO: 3.66 X10*6/UL (ref 4.5–5.9)
RIGHT VENTRICLE FREE WALL PEAK S': 18.6 CM/S
RIGHT VENTRICLE PEAK SYSTOLIC PRESSURE: 26 MMHG
SODIUM SERPL-SCNC: 139 MMOL/L (ref 136–145)
TRICUSPID ANNULAR PLANE SYSTOLIC EXCURSION: 2.8 CM
TRIGL SERPL-MCNC: 64 MG/DL (ref 0–149)
TSH SERPL-ACNC: 2.63 MIU/L (ref 0.44–3.98)
VIT B12 SERPL-MCNC: 276 PG/ML (ref 211–911)
VLDL: 13 MG/DL (ref 0–40)
WBC # BLD AUTO: 7.9 X10*3/UL (ref 4.4–11.3)

## 2024-02-23 PROCEDURE — S4991 NICOTINE PATCH NONLEGEND: HCPCS | Performed by: NURSE PRACTITIONER

## 2024-02-23 PROCEDURE — 94640 AIRWAY INHALATION TREATMENT: CPT

## 2024-02-23 PROCEDURE — 2500000002 HC RX 250 W HCPCS SELF ADMINISTERED DRUGS (ALT 637 FOR MEDICARE OP, ALT 636 FOR OP/ED): Performed by: INTERNAL MEDICINE

## 2024-02-23 PROCEDURE — 97535 SELF CARE MNGMENT TRAINING: CPT | Mod: GO

## 2024-02-23 PROCEDURE — 99223 1ST HOSP IP/OBS HIGH 75: CPT | Performed by: NURSE PRACTITIONER

## 2024-02-23 PROCEDURE — 72141 MRI NECK SPINE W/O DYE: CPT | Performed by: STUDENT IN AN ORGANIZED HEALTH CARE EDUCATION/TRAINING PROGRAM

## 2024-02-23 PROCEDURE — 70544 MR ANGIOGRAPHY HEAD W/O DYE: CPT | Mod: 59

## 2024-02-23 PROCEDURE — 99222 1ST HOSP IP/OBS MODERATE 55: CPT | Performed by: INTERNAL MEDICINE

## 2024-02-23 PROCEDURE — 36415 COLL VENOUS BLD VENIPUNCTURE: CPT | Performed by: NURSE PRACTITIONER

## 2024-02-23 PROCEDURE — 85027 COMPLETE CBC AUTOMATED: CPT | Performed by: NURSE PRACTITIONER

## 2024-02-23 PROCEDURE — 97162 PT EVAL MOD COMPLEX 30 MIN: CPT | Mod: GP

## 2024-02-23 PROCEDURE — 97165 OT EVAL LOW COMPLEX 30 MIN: CPT | Mod: GO

## 2024-02-23 PROCEDURE — 72148 MRI LUMBAR SPINE W/O DYE: CPT | Performed by: STUDENT IN AN ORGANIZED HEALTH CARE EDUCATION/TRAINING PROGRAM

## 2024-02-23 PROCEDURE — 2500000001 HC RX 250 WO HCPCS SELF ADMINISTERED DRUGS (ALT 637 FOR MEDICARE OP): Performed by: NURSE PRACTITIONER

## 2024-02-23 PROCEDURE — 2500000001 HC RX 250 WO HCPCS SELF ADMINISTERED DRUGS (ALT 637 FOR MEDICARE OP): Performed by: INTERNAL MEDICINE

## 2024-02-23 PROCEDURE — 72148 MRI LUMBAR SPINE W/O DYE: CPT

## 2024-02-23 PROCEDURE — 2500000002 HC RX 250 W HCPCS SELF ADMINISTERED DRUGS (ALT 637 FOR MEDICARE OP, ALT 636 FOR OP/ED): Performed by: STUDENT IN AN ORGANIZED HEALTH CARE EDUCATION/TRAINING PROGRAM

## 2024-02-23 PROCEDURE — 80048 BASIC METABOLIC PNL TOTAL CA: CPT | Performed by: NURSE PRACTITIONER

## 2024-02-23 PROCEDURE — 93306 TTE W/DOPPLER COMPLETE: CPT

## 2024-02-23 PROCEDURE — 93306 TTE W/DOPPLER COMPLETE: CPT | Performed by: INTERNAL MEDICINE

## 2024-02-23 PROCEDURE — 70551 MRI BRAIN STEM W/O DYE: CPT

## 2024-02-23 PROCEDURE — 70547 MR ANGIOGRAPHY NECK W/O DYE: CPT | Mod: 59

## 2024-02-23 PROCEDURE — 83036 HEMOGLOBIN GLYCOSYLATED A1C: CPT | Performed by: STUDENT IN AN ORGANIZED HEALTH CARE EDUCATION/TRAINING PROGRAM

## 2024-02-23 PROCEDURE — 70551 MRI BRAIN STEM W/O DYE: CPT | Performed by: STUDENT IN AN ORGANIZED HEALTH CARE EDUCATION/TRAINING PROGRAM

## 2024-02-23 PROCEDURE — 70547 MR ANGIOGRAPHY NECK W/O DYE: CPT | Performed by: STUDENT IN AN ORGANIZED HEALTH CARE EDUCATION/TRAINING PROGRAM

## 2024-02-23 PROCEDURE — 99223 1ST HOSP IP/OBS HIGH 75: CPT | Performed by: PSYCHIATRY & NEUROLOGY

## 2024-02-23 PROCEDURE — 70544 MR ANGIOGRAPHY HEAD W/O DYE: CPT | Performed by: STUDENT IN AN ORGANIZED HEALTH CARE EDUCATION/TRAINING PROGRAM

## 2024-02-23 PROCEDURE — 2500000001 HC RX 250 WO HCPCS SELF ADMINISTERED DRUGS (ALT 637 FOR MEDICARE OP)

## 2024-02-23 PROCEDURE — 83921 ORGANIC ACID SINGLE QUANT: CPT | Performed by: STUDENT IN AN ORGANIZED HEALTH CARE EDUCATION/TRAINING PROGRAM

## 2024-02-23 PROCEDURE — 84443 ASSAY THYROID STIM HORMONE: CPT | Performed by: STUDENT IN AN ORGANIZED HEALTH CARE EDUCATION/TRAINING PROGRAM

## 2024-02-23 PROCEDURE — 72141 MRI NECK SPINE W/O DYE: CPT

## 2024-02-23 PROCEDURE — 2500000004 HC RX 250 GENERAL PHARMACY W/ HCPCS (ALT 636 FOR OP/ED): Performed by: NURSE PRACTITIONER

## 2024-02-23 PROCEDURE — 82607 VITAMIN B-12: CPT | Mod: PARLAB | Performed by: STUDENT IN AN ORGANIZED HEALTH CARE EDUCATION/TRAINING PROGRAM

## 2024-02-23 PROCEDURE — 80061 LIPID PANEL: CPT | Performed by: NURSE PRACTITIONER

## 2024-02-23 PROCEDURE — 2500000002 HC RX 250 W HCPCS SELF ADMINISTERED DRUGS (ALT 637 FOR MEDICARE OP, ALT 636 FOR OP/ED): Performed by: NURSE PRACTITIONER

## 2024-02-23 PROCEDURE — G0378 HOSPITAL OBSERVATION PER HR: HCPCS

## 2024-02-23 RX ORDER — CLOPIDOGREL BISULFATE 75 MG/1
75 TABLET ORAL DAILY
Status: DISCONTINUED | OUTPATIENT
Start: 2024-02-23 | End: 2024-02-28 | Stop reason: HOSPADM

## 2024-02-23 RX ORDER — ACETAMINOPHEN 325 MG/1
650 TABLET ORAL EVERY 6 HOURS PRN
Status: DISCONTINUED | OUTPATIENT
Start: 2024-02-23 | End: 2024-02-23 | Stop reason: SDUPTHER

## 2024-02-23 RX ORDER — LISINOPRIL 10 MG/1
10 TABLET ORAL DAILY
Status: DISCONTINUED | OUTPATIENT
Start: 2024-02-23 | End: 2024-02-28 | Stop reason: HOSPADM

## 2024-02-23 RX ORDER — CHOLECALCIFEROL (VITAMIN D3) 25 MCG
2000 TABLET ORAL DAILY
Status: DISCONTINUED | OUTPATIENT
Start: 2024-02-23 | End: 2024-02-28 | Stop reason: HOSPADM

## 2024-02-23 RX ORDER — DIAZEPAM 5 MG/1
10 TABLET ORAL ONCE
Status: COMPLETED | OUTPATIENT
Start: 2024-02-23 | End: 2024-02-23

## 2024-02-23 RX ORDER — IPRATROPIUM BROMIDE AND ALBUTEROL SULFATE 2.5; .5 MG/3ML; MG/3ML
3 SOLUTION RESPIRATORY (INHALATION) 3 TIMES DAILY
Status: DISCONTINUED | OUTPATIENT
Start: 2024-02-23 | End: 2024-02-26

## 2024-02-23 RX ORDER — PREGABALIN 50 MG/1
50 CAPSULE ORAL 2 TIMES DAILY
Status: DISCONTINUED | OUTPATIENT
Start: 2024-02-23 | End: 2024-02-28 | Stop reason: HOSPADM

## 2024-02-23 RX ORDER — DONEPEZIL HYDROCHLORIDE 10 MG/1
10 TABLET, FILM COATED ORAL NIGHTLY
Status: DISCONTINUED | OUTPATIENT
Start: 2024-02-23 | End: 2024-02-28 | Stop reason: HOSPADM

## 2024-02-23 RX ORDER — NAPROXEN SODIUM 220 MG/1
81 TABLET, FILM COATED ORAL NIGHTLY
Status: DISCONTINUED | OUTPATIENT
Start: 2024-02-23 | End: 2024-02-28 | Stop reason: HOSPADM

## 2024-02-23 RX ORDER — ESCITALOPRAM OXALATE 10 MG/1
5 TABLET ORAL DAILY
Status: DISCONTINUED | OUTPATIENT
Start: 2024-02-24 | End: 2024-02-28 | Stop reason: HOSPADM

## 2024-02-23 RX ORDER — ATORVASTATIN CALCIUM 80 MG/1
80 TABLET, FILM COATED ORAL NIGHTLY
Status: DISCONTINUED | OUTPATIENT
Start: 2024-02-23 | End: 2024-02-28 | Stop reason: HOSPADM

## 2024-02-23 RX ORDER — SENNOSIDES 8.6 MG/1
1 TABLET ORAL NIGHTLY
Status: DISCONTINUED | OUTPATIENT
Start: 2024-02-23 | End: 2024-02-28 | Stop reason: HOSPADM

## 2024-02-23 RX ORDER — TRAMADOL HYDROCHLORIDE 50 MG/1
25 TABLET ORAL EVERY 6 HOURS PRN
Status: DISCONTINUED | OUTPATIENT
Start: 2024-02-23 | End: 2024-02-28 | Stop reason: HOSPADM

## 2024-02-23 RX ORDER — ALBUTEROL SULFATE 90 UG/1
2 AEROSOL, METERED RESPIRATORY (INHALATION) EVERY 6 HOURS PRN
Status: DISCONTINUED | OUTPATIENT
Start: 2024-02-23 | End: 2024-02-28 | Stop reason: HOSPADM

## 2024-02-23 RX ORDER — MULTIVIT-MIN/IRON FUM/FOLIC AC 7.5 MG-4
1 TABLET ORAL DAILY
Status: DISCONTINUED | OUTPATIENT
Start: 2024-02-23 | End: 2024-02-28 | Stop reason: HOSPADM

## 2024-02-23 RX ADMIN — IPRATROPIUM BROMIDE AND ALBUTEROL SULFATE 3 ML: .5; 3 SOLUTION RESPIRATORY (INHALATION) at 12:29

## 2024-02-23 RX ADMIN — NICOTINE 1 PATCH: 21 PATCH, EXTENDED RELEASE TRANSDERMAL at 10:07

## 2024-02-23 RX ADMIN — PREGABALIN 50 MG: 50 CAPSULE ORAL at 21:23

## 2024-02-23 RX ADMIN — SENNOSIDES 8.6 MG: 8.6 TABLET, FILM COATED ORAL at 21:23

## 2024-02-23 RX ADMIN — ENOXAPARIN SODIUM 40 MG: 40 INJECTION SUBCUTANEOUS at 10:07

## 2024-02-23 RX ADMIN — ASPIRIN 81 MG: 81 TABLET, COATED ORAL at 10:06

## 2024-02-23 RX ADMIN — Medication 1 TABLET: at 21:23

## 2024-02-23 RX ADMIN — DONEPEZIL HYDROCHLORIDE 10 MG: 10 TABLET ORAL at 21:29

## 2024-02-23 RX ADMIN — LISINOPRIL 10 MG: 10 TABLET ORAL at 21:23

## 2024-02-23 RX ADMIN — Medication 2000 UNITS: at 21:23

## 2024-02-23 RX ADMIN — DIAZEPAM 10 MG: 5 TABLET ORAL at 17:52

## 2024-02-23 RX ADMIN — ALBUTEROL SULFATE 2.5 MG: 2.5 SOLUTION RESPIRATORY (INHALATION) at 06:58

## 2024-02-23 RX ADMIN — POLYETHYLENE GLYCOL 3350 17 G: 17 POWDER, FOR SOLUTION ORAL at 10:07

## 2024-02-23 RX ADMIN — CLOPIDOGREL 75 MG: 75 TABLET ORAL at 21:23

## 2024-02-23 RX ADMIN — TRAMADOL HYDROCHLORIDE 25 MG: 50 TABLET ORAL at 10:03

## 2024-02-23 RX ADMIN — TRAMADOL HYDROCHLORIDE 25 MG: 50 TABLET ORAL at 17:52

## 2024-02-23 RX ADMIN — ATORVASTATIN CALCIUM 80 MG: 80 TABLET, FILM COATED ORAL at 21:24

## 2024-02-23 RX ADMIN — ACETAMINOPHEN 650 MG: 325 TABLET ORAL at 15:43

## 2024-02-23 ASSESSMENT — PAIN DESCRIPTION - DESCRIPTORS
DESCRIPTORS: ACHING
DESCRIPTORS: ACHING

## 2024-02-23 ASSESSMENT — COGNITIVE AND FUNCTIONAL STATUS - GENERAL
WALKING IN HOSPITAL ROOM: A LITTLE
TOILETING: A LITTLE
MOBILITY SCORE: 15
DRESSING REGULAR LOWER BODY CLOTHING: A LOT
MOVING TO AND FROM BED TO CHAIR: A LOT
DRESSING REGULAR UPPER BODY CLOTHING: A LITTLE
CLIMB 3 TO 5 STEPS WITH RAILING: A LOT
MOVING FROM LYING ON BACK TO SITTING ON SIDE OF FLAT BED WITH BEDRAILS: A LITTLE
DAILY ACTIVITIY SCORE: 17
HELP NEEDED FOR BATHING: A LITTLE
EATING MEALS: A LITTLE
STANDING UP FROM CHAIR USING ARMS: A LOT
PERSONAL GROOMING: A LITTLE
TURNING FROM BACK TO SIDE WHILE IN FLAT BAD: A LITTLE

## 2024-02-23 ASSESSMENT — PAIN SCALES - GENERAL
PAINLEVEL_OUTOF10: 3
PAINLEVEL_OUTOF10: 2
PAINLEVEL_OUTOF10: 10 - WORST POSSIBLE PAIN
PAINLEVEL_OUTOF10: 7
PAINLEVEL_OUTOF10: 10 - WORST POSSIBLE PAIN
PAINLEVEL_OUTOF10: 10 - WORST POSSIBLE PAIN
PAINLEVEL_OUTOF10: 2
PAINLEVEL_OUTOF10: 0 - NO PAIN

## 2024-02-23 ASSESSMENT — PAIN - FUNCTIONAL ASSESSMENT
PAIN_FUNCTIONAL_ASSESSMENT: 0-10

## 2024-02-23 ASSESSMENT — PAIN DESCRIPTION - LOCATION: LOCATION: BACK

## 2024-02-23 ASSESSMENT — ACTIVITIES OF DAILY LIVING (ADL): HOME_MANAGEMENT_TIME_ENTRY: 16

## 2024-02-23 NOTE — CARE PLAN
The patient's goals for the shift include sleep well    The clinical goals for the shift include Maintain safety      Problem: Pain  Goal: My pain/discomfort is manageable  Outcome: Progressing     Problem: Safety  Goal: Patient will be injury free during hospitalization  Outcome: Progressing  Goal: I will remain free of falls  Outcome: Progressing     Problem: Skin  Goal: Promote/optimize nutrition  Outcome: Progressing  Flowsheets (Taken 2/23/2024 1649)  Promote/optimize nutrition: Assist with feeding  Goal: Promote skin healing  Outcome: Progressing  Flowsheets (Taken 2/23/2024 1649)  Promote skin healing: Rotate device position/do not position patient on device     Problem: Pain - Adult  Goal: Verbalizes/displays adequate comfort level or baseline comfort level  Outcome: Progressing     Problem: Safety - Adult  Goal: Free from fall injury  Outcome: Progressing     Problem: Discharge Planning  Goal: Discharge to home or other facility with appropriate resources  Outcome: Progressing     Problem: Chronic Conditions and Co-morbidities  Goal: Patient's chronic conditions and co-morbidity symptoms are monitored and maintained or improved  Outcome: Progressing     Problem: Pain  Goal: Takes deep breaths with improved pain control throughout the shift  Outcome: Progressing  Goal: Turns in bed with improved pain control throughout the shift  Outcome: Progressing  Goal: Walks with improved pain control throughout the shift  Outcome: Progressing  Goal: Performs ADL's with improved pain control throughout shift  Outcome: Progressing  Goal: Participates in PT with improved pain control throughout the shift  Outcome: Progressing  Goal: Free from opioid side effects throughout the shift  Outcome: Progressing  Goal: Free from acute confusion related to pain meds throughout the shift  Outcome: Progressing

## 2024-02-23 NOTE — H&P
History Of Present Illness  Oni Newsome is a 73-year-old male with past medical history of Alzheimer's dementia, hypertension, hyperlipidemia, COPD, nicotine use disorder, AAA, PAD s/p angioplasty LLE and left great toe amputation, BPH, macular degeneration, and cataracts.  Patient presents to the hospital with multiple complaints.  He is currently alert and oriented x 3, slow to respond at times with flat affect.  Reports sharp left chest pains, worse with inspiration, daily but not constant.  Activity makes symptoms worse.  Reports generalized weakness, but feels left side is more weak than right.  Dizziness, feels like the room is spinning, started over the past several days.  Denies prior history of vertigo.  He is concerned that he is not able to take care of himself any longer at home.  States he is largely nonambulatory.  Denies support from family/friends.  States he is unable to make his meals has lost significant mental weight.  Feels like he may hurt himself on accident, no suicidal ideation.  ROS additionally positive for headaches, blurry vision, generalized weakness/fatigue, feeling sad/depressed, cough, wheezing, shortness of breath, swollen tender nodes in his neck, low back pain, constipation (last bowel movement 3 days ago), black stools.  Denies wounds, urinary complaints, nausea, vomiting.  States that he is compliant with medications, but is out of his blood thinner (history of being on Plavix).  He would like placed at the skilled nursing facility    ER course: Hemodynamically stable, afebrile, SpO2 95% on room air.  EKG showed sinus bradycardia, ventricular rate 59 bpm, no acute ST changes. CXR showed no acute process. In the ED he received Toradol, meclizine, and Zofran.  WBC 6.8, hemoglobin 12.4/hematocrit 36.4 (stable at baseline), platelets 258.  CMP was unremarkable.  Magnesium 2.07.  Lactate 0.7.  BNP 32.  High-sensitivity troponin 7.  Influenza, RSV, and coronavirus negative.  UA  negative for infection.  Drug tox negative. CTA chest on 2/15/2024 noted newly seen large left hilar node, newly seen bibasilar segmental mucous plugging.  Newly seen endplate deformity under L3 and L4 of indeterminate acuity, stable focal bulge right distal common iliac artery which is partially thrombosed.    Past medical history: As above  Past surgical history: as above  Social history: long term smoker 1ppd, no illicit drug use, no alcohol abuse.  [place] [employer]. Wife in SNF.  Family History: mother - 3 heart attacks     Past Medical History  Past Medical History:   Diagnosis Date    Abdominal pain 02/19/2024    Abnormal weight loss 02/19/2024    Activity, unspecified 02/19/2024    Acute encephalopathy 02/19/2024    Age-related nuclear cataract, bilateral 02/19/2024    Age-related physical debility 02/19/2024    Atherosclerosis of artery of left lower extremity (CMS/HCC) 02/19/2024    Atherosclerosis of native arteries of extremities with rest pain, left leg (CMS/HCC) 02/19/2024    Benign prostatic hyperplasia 05/02/2002    Carbuncle 02/19/2024    Cellulitis 02/18/2023    Cervical radiculopathy 02/19/2024    Chronic obstructive pulmonary disease (CMS/HCC) 02/19/2024    Chronic pain 02/19/2024    Constipation 02/19/2024    Degeneration of cervical intervertebral disc 02/19/2024    Delusional disorder (CMS/[facility]) 02/19/2024    Dementia (CMS/[facility]) 02/19/2024    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 (CMS/[facility]) 02/19/2024    Dementia with behavioral disturbance (CMS/[facility]) 02/19/2024    Depressive disorder 05/02/2002    Financial insecurity 02/19/2024    Gangrene (CMS/HCC) 02/19/2024    Gastritis due to Helicobacter species 02/19/2024    Hematemesis 02/19/2024    High cholesterol     Hyperammonemia (CMS/HCC) 02/19/2024    Hyperlipidemia 02/19/2024    Hyperlipidemia 02/19/2024    Hypertension     Hypokalemia 02/19/2024    Hyponatremia 02/19/2024    Impotence of organic origin 02/19/2024    Low back pain 02/19/2024     Low back pain, unspecified 02/19/2024    Lumbar radiculopathy 02/19/2024    Lumbar spondylosis 02/19/2024    Lumbar stenosis with neurogenic claudication 02/19/2024    Macular degeneration 02/19/2024    Mild cognitive impairment of uncertain or unknown etiology 02/19/2024    Mixed hyperlipidemia 05/02/2002    Morbid obesity (CMS/[facility]) 05/02/2002    Multiple nodules of lung 02/19/2024    Neurosyphilis 02/19/2024    Nicotine dependence 02/19/2024    Nicotine dependence, unspecified, uncomplicated 02/19/2024    Noncompliance with treatment 02/19/2024    Obesity 02/19/2024    Obesity with body mass index 30 or greater 02/19/2024    PAD (peripheral artery disease) (CMS/[facility]) 02/24/2023    Polyp of colon 02/19/2024    Primary hypertension 02/19/2024    Prolonged Q-T interval on ECG 02/19/2024    Proteinuria 02/19/2024    Psychosis (CMS/[facility]) 02/19/2024    Psychosis, paranoid, involutional (CMS/[facility]) 02/19/2024    Radiculopathy, lumbar region 02/19/2024    Rash and other nonspecific skin eruption 02/19/2024    Subacute subdural hematoma (CMS/[facility]) 02/19/2024    Subdural hemorrhage (CMS/[facility]) 02/19/2024    Supraventricular tachycardia 02/19/2024    Tobacco use disorder 05/02/2002    Unspecified dementia, moderate, with anxiety (CMS/[facility]) 02/19/2024    Unspecified dementia, unspecified severity, with agitation (CMS/[facility]) 02/19/2024    Urinary obstruction 02/19/2024       Surgical History  Past Surgical History:   Procedure Laterality Date    TOE AMPUTATION          Social History  He reports that he has been smoking cigarettes. He has been smoking an average of 1 pack per day. He has never used smokeless tobacco. He reports that he does not currently use alcohol. He reports that he does not use drugs.    Family History  Family History   Problem Relation Name Age of Onset    Heart disease Mother          Allergies  Gabapentin    Review of Systems  10 point ROS negative except as noted above in HPI    Physical Exam  Constitutional:   "     General: He is awake. He is not in acute distress.     Appearance: Normal appearance. He is not toxic-appearing.   HENT:      Head: Atraumatic.      Nose: Nose normal.      Mouth/Throat:      Mouth: Mucous membranes are moist.   Eyes:      Extraocular Movements: Extraocular movements intact.      Conjunctiva/sclera: Conjunctivae normal.      Pupils: Pupils are equal, round, and reactive to light.   Cardiovascular:      Rate and Rhythm: Normal rate and regular rhythm.      Pulses: Normal pulses.      Heart sounds: No murmur heard.  Pulmonary:      Effort: Pulmonary effort is normal.      Breath sounds: Wheezing and rhonchi present.   Abdominal:      General: Bowel sounds are normal. There is no distension.      Palpations: Abdomen is soft.      Tenderness: There is no abdominal tenderness. There is no guarding.   Musculoskeletal:         General: No swelling, deformity or signs of injury. Normal range of motion.      Cervical back: Neck supple.      Right lower leg: No edema.      Left lower leg: No edema.   Skin:     General: Skin is warm and dry.      Capillary Refill: Capillary refill takes less than 2 seconds.      Findings: No ecchymosis, erythema or wound.   Neurological:      General: No focal deficit present.      Mental Status: He is alert and oriented to person, place, and time.   Psychiatric:         Mood and Affect: Affect is flat.         Speech: Speech is delayed.         Behavior: Behavior is cooperative.         Thought Content: Thought content normal.            Last Recorded Vitals  Blood pressure 135/62, pulse 59, temperature 37.1 °C (98.8 °F), temperature source Tympanic, resp. rate 16, height 1.727 m (5' 8\"), weight 63.5 kg (140 lb), SpO2 96 %.    Relevant Results    XR chest 2 views    Result Date: 2/22/2024  STUDY: Chest Radiographs;  2/22/24 at 1:05 PM INDICATION: Cough. COMPARISON: Chest XR 7/22/23. ACCESSION NUMBER(S): [id number] ORDERING CLINICIAN: SHIKHA LONG TECHNIQUE:  Frontal " and lateral chest. (three images) FINDINGS: CARDIOMEDIASTINAL SILHOUETTE: Cardiomediastinal silhouette is normal in size and configuration.  LUNGS: Lungs are clear.  ABDOMEN: No remarkable upper abdominal findings.  BONES: No acute osseous changes.    No acute process. Signed by Prince Iraheta MD    ECG 12 lead    Result Date: 2/22/2024  Sinus bradycardia Cannot rule out Anterior infarct , age undetermined Abnormal ECG When compared with ECG of 15-FEB-2024 14:39, PREVIOUS ECG IS PRESENT    ECG 12 lead    Result Date: 2/16/2024  Sinus rhythm See ED provider note for full interpretation and clinical correlation Confirmed by Juliet Prasad ([id number]) on 2/16/2024 3:04:28 PM    CT head wo IV contrast    Result Date: 2/15/2024  Interpreted By:  Fidencio Zhu, STUDY: CT HEAD WO IV CONTRAST;  2/15/2024 5:01 pm   INDICATION: Trauma. Signs/Symptoms:hx of subdural, dizziness, on plavix.   COMPARISON: None.   ACCESSION NUMBER(S): [id number]   ORDERING CLINICIAN: KIAN MCINTYRE   TECHNIQUE: Axial noncontrast head CT   FINDINGS: The ventricles, cisterns and sulci are prominent, consistent with mild diffuse volume loss. There are areas of nonspecific white matter hypodensity, which are probably age-related or microvascular in nature.   Gray-white differentiation is intact and there is no evidence of acute cortical infarct. No acute mass, mass effect or midline shift is seen. There is no evidence of acute hemorrhage. Pre-existing left subdural collection is nearly completely resolved with 2 mm residual bandlike thickening overlying the cerebral convexity coronal series 205, image 42 compared with 9 mm. No significant remaining right-sided subdural abnormality.   The visualized paranasal sinuses are clear.   Calvarium: No detected fracture.   Brain Injury (BIG) guidelines CT values:   Skull fracture: No SDH (subdural hematoma): None detected EDH (epidural hemtoma): None detected IPH (intraparenchymal hemorrhage): None  detected SAH (subarachnoid hemorrhage): None detected IVH (intraventricular hemorrhage): No   Reference: Sahil CORADO, Bindu RS, Stephanie M, et al. The BIG (brain injury guidelines) project: defining the management of traumatic brain injury by acute care surgeons. J Trauma Acute Care Surg. 2014;76:618k864.       No acute intracranial abnormality was identified.   Near complete resolution of left-sided subdural hematoma with trace residual bandlike thickening.   No significant remaining right-sided subdural collection.   MACRO: None     Signed by: Fidencio Zhu 2/15/2024 6:31 PM Dictation workstation:   [id number]    CT angio chest abdomen pelvis    Result Date: 2/15/2024  Interpreted By:  Fidencio Zhu, STUDY: CT ANGIO CHEST ABDOMEN PELVIS;  2/15/2024 5:01 pm   INDICATION: Signs/Symptoms:hx of AAA, chest pain, back pain.   COMPARISON: April 6, 2018 CT chest abdomen and pelvis. July 27, 2019 CT abdomen and pelvis   ACCESSION NUMBER(S): [id number]   ORDERING CLINICIAN: KIAN MCINTYRE   TECHNIQUE: Axial non-contrast images of the chest, abdomen, and pelvis  with coronal and sagittal reformatted images. Axial CT images of the chest, abdomen and pelvis after the intravenous administration of 90 mL of Omnipaque 350 using CT angiographic technique with coronal and sagittal reformatted images.  MIP images were provided and reviewed. 3D reconstructions were performed on a separate independent workstation.   FINDINGS: Likely technically adequate although there is image degradation related to motion limiting the accuracy especially the abdominal portion.   VASCULAR:   PULMONARY ARTERIES:   No gross central pulmonary embolism. Limited enhancement of the peripheral vessels related to technique.   THORACIC AORTA:  Non-contrast images show no evidence of acute intramural hematoma. No thoracic aortic aneurysm or dissection. Stable scattered atherosclerosis.   ABDOMINAL AORTA: No abdominal aortic dissection. Stable focal  bulge of the distal right common iliac artery measuring 14 mm transverse diameter with eccentric soft tissue density and calcified plaque series 501, image 437. there is moderate atherosclerosis abdominal aorta and branch vessels.   ABDOMINAL AND PELVIC ARTERIES: No hemodynamically significant stenosis or occlusion.     CHEST:   MEDIASTINUM AND LYMPH NODES: Interval enlargement of left hilar lymph node which measures 2.4 x 1.8 cm series 501, image 119 not evident on prior chest CT.   Heart: Stable appearance. Scattered coronary artery calcifications. No significant pericardial effusion.   LUNG, PLEURA, LARGE AIRWAYS: Allowing for difference in degree of lung expansion the overall aeration is similar with persistent mild mosaic attenuation and scattered multifocal small pulmonary nodules some of which are clustered including adjacent 5 mm nodules left lower lobe series 21, image 185 and 5 mm nodule right base image 211. No consolidative pneumonia caroline edema or effusion. Bibasilar subsegmental mucous plugging. Stable postinflammatory calcifications left hilar region.   OSSEOUS STRUCTURES/CHEST WALL:    No acute osseous abnormality.     ABDOMEN/PELVIS:   Arterial phase imaging limits evaluation of the solid organs.   ABDOMINAL WALL: Within normal limits.   LIVER: No significant abnormality. BILE DUCTS: No significant abnormality. GALLBLADDER: No significant abnormality.   PANCREAS: No significant abnormality.   SPLEEN: No significant abnormality.   ADRENALS: No significant abnormality.   KIDNEYS, URETERS, BLADDER: No significant abnormality.   VESSELS: See above.  No additional significant abnormality. LYMPH NODES: No pathologic sized nodes by CT criteria. RETROPERITONEUM:  No significant abnormality.   BOWEL: No inflammatory bowel wall thickening or dilatation.  Normal appendix.   PERITONEUM:   No significant ascites, free air, or fluid collection.   REPRODUCTIVE ORGANS: Stable prostatomegaly measuring 6.2 cm  transverse diameter indenting the base the urinary bladder.   OSSEOUS STRUCTURES:  No acute osseous abnormality. Compared with July 27, 2019 CT abdomen and pelvis increase superior endplate deformities C3 and C4 compatible with interval Schmorl's nodes and degenerative changes indeterminate acuity.       No thoracic or abdominal aortic aneurysm or dissection.   Newly seen enlarged left hilar node of indeterminate cause. The finding could be due to inflammatory lymph node, or neoplasm. Recommend further evaluation. Consider PET-CT, tissue sampling or short-term follow-up.   Newly seen bibasilar subsegmental mucous plugging.   Newly seen endplate deformities superior L3 and L4 of indeterminate acuity.   Stable focal bulge right distal common iliac artery which is partially thrombosed.   Additional stable findings as reported.   Signed by: Fidencio Zhu 2/15/2024 6:26 PM Dictation workstation:   [id number]     Results for orders placed or performed during the hospital encounter of 02/22/24 (from the past 24 hour(s))   ECG 12 lead   Result Value Ref Range    Ventricular Rate 59 BPM    Atrial Rate 59 BPM    KS Interval 162 ms    QRS Duration 96 ms    QT Interval 464 ms    QTC Calculation(Bazett) 459 ms    P Axis 51 degrees    R Axis 27 degrees    T Axis 48 degrees    QRS Count 10 beats    Q Onset 217 ms    P Onset 136 ms    P Offset 190 ms    T Offset 449 ms    QTC Fredericia 461 ms   Sars-CoV-2 PCR   Result Value Ref Range    Coronavirus 2019, PCR Not Detected Not Detected   Influenza A, and B PCR   Result Value Ref Range    Flu A Result Not Detected Not Detected    Flu B Result Not Detected Not Detected   RSV PCR   Result Value Ref Range    RSV PCR Not Detected Not Detected   CBC and Auto Differential   Result Value Ref Range    WBC 6.8 4.4 - 11.3 x10*3/uL    nRBC 0.0 0.0 - 0.0 /100 WBCs    RBC 3.87 (L) 4.50 - 5.90 x10*6/uL    Hemoglobin 12.4 (L) 13.5 - 17.5 g/dL    Hematocrit 36.4 (L) 41.0 - 52.0 %    MCV 94 80 -  100 fL    MCH 32.0 26.0 - 34.0 pg    MCHC 34.1 32.0 - 36.0 g/dL    RDW 15.0 (H) 11.5 - 14.5 %    Platelets 258 150 - 450 x10*3/uL    Neutrophils % 66.9 40.0 - 80.0 %    Immature Granulocytes %, Automated 0.1 0.0 - 0.9 %    Lymphocytes % 22.3 13.0 - 44.0 %    Monocytes % 8.9 2.0 - 10.0 %    Eosinophils % 1.2 0.0 - 6.0 %    Basophils % 0.6 0.0 - 2.0 %    Neutrophils Absolute 4.53 1.60 - 5.50 x10*3/uL    Immature Granulocytes Absolute, Automated 0.01 0.00 - 0.50 x10*3/uL    Lymphocytes Absolute 1.51 0.80 - 3.00 x10*3/uL    Monocytes Absolute 0.60 0.05 - 0.80 x10*3/uL    Eosinophils Absolute 0.08 0.00 - 0.40 x10*3/uL    Basophils Absolute 0.04 0.00 - 0.10 x10*3/uL   Magnesium   Result Value Ref Range    Magnesium 2.07 1.60 - 2.40 mg/dL   Comprehensive metabolic panel   Result Value Ref Range    Glucose 86 74 - 99 mg/dL    Sodium 137 136 - 145 mmol/L    Potassium 3.7 3.5 - 5.3 mmol/L    Chloride 105 98 - 107 mmol/L    Bicarbonate 27 21 - 32 mmol/L    Anion Gap 9 (L) 10 - 20 mmol/L    Urea Nitrogen 15 6 - 23 mg/dL    Creatinine 0.70 0.50 - 1.30 mg/dL    eGFR >90 >60 mL/min/1.73m*2    Calcium 9.3 8.6 - 10.3 mg/dL    Albumin 3.8 3.4 - 5.0 g/dL    Alkaline Phosphatase 53 33 - 136 U/L    Total Protein 6.1 (L) 6.4 - 8.2 g/dL    AST 14 9 - 39 U/L    Bilirubin, Total 0.5 0.0 - 1.2 mg/dL    ALT 10 10 - 52 U/L   Troponin I, High Sensitivity   Result Value Ref Range    Troponin I, High Sensitivity 7 0 - 20 ng/L   B-Type Natriuretic Peptide   Result Value Ref Range    BNP 32 0 - 99 pg/mL   Lactate   Result Value Ref Range    Lactate 0.7 0.4 - 2.0 mmol/L   Acute Toxicology Panel, Blood   Result Value Ref Range    Acetaminophen <10.0 10.0 - 30.0 ug/mL    Salicylate  <3 4 - 20 mg/dL    Alcohol <10 <=10 mg/dL   Drug Screen, Urine   Result Value Ref Range    Amphetamine Screen, Urine Presumptive Negative Presumptive Negative    Barbiturate Screen, Urine Presumptive Negative Presumptive Negative    Benzodiazepines Screen, Urine  Presumptive Negative Presumptive Negative    Cannabinoid Screen, Urine Presumptive Negative Presumptive Negative    Cocaine Metabolite Screen, Urine Presumptive Negative Presumptive Negative    Fentanyl Screen, Urine Presumptive Negative Presumptive Negative    Opiate Screen, Urine Presumptive Negative Presumptive Negative    Oxycodone Screen, Urine Presumptive Negative Presumptive Negative    PCP Screen, Urine Presumptive Negative Presumptive Negative   Urinalysis with Reflex Culture and Microscopic   Result Value Ref Range    Color, Urine Klaudia (N) Straw, Yellow    Appearance, Urine Hazy (N) Clear    Specific Gravity, Urine 1.026 1.005 - 1.035    pH, Urine 5.0 5.0, 5.5, 6.0, 6.5, 7.0, 7.5, 8.0    Protein, Urine 30 (1+) (N) NEGATIVE mg/dL    Glucose, Urine NEGATIVE NEGATIVE mg/dL    Blood, Urine NEGATIVE NEGATIVE    Ketones, Urine NEGATIVE NEGATIVE mg/dL    Bilirubin, Urine NEGATIVE NEGATIVE    Urobilinogen, Urine 2.0 (N) <2.0 mg/dL    Nitrite, Urine NEGATIVE NEGATIVE    Leukocyte Esterase, Urine NEGATIVE NEGATIVE   Urinalysis Microscopic   Result Value Ref Range    WBC, Urine NONE 1-5, NONE /HPF    RBC, Urine NONE NONE, 1-2, 3-5 /HPF    Mucus, Urine 2+ Reference range not established. /LPF    Calcium Oxalate Crystals, Urine 2+ (A) NONE, 1+ /HPF          Assessment/Plan   Principal Problem:    Dizziness    73-year-old male with past medical history of Alzheimer's dementia, hypertension, hyperlipidemia, COPD, nicotine use disorder, AAA, PAD s/p angioplasty LLE and left great toe amputation, BPH, macular degeneration, and cataracts.  Patient presents to the hospital with multiple complaints most notably for dizziness, chest pain, generalized weakness/fatigue, and difficulty with ADLs at home.    #Dizziness  #Tremor, left upper extremity  #Weakness, generalized    Telemetry monitoring  Neurology consult for input  MRI of the brain, MRA of the head and neck  Echocardiogram  Orthostatic vitals  PT/OT with [facility]  Social  work for discharge planning, patient concerned he can no longer care for himself    #Chest pain    Cardiology consult, appreciate input  Reports left chest discomfort, sounds pleuritic in nature  Initial ischemic workup in the ED negative.  Trend troponins.  Echocardiogram  Lipid panel in a.m.  Aspirin 81 mg daily    #COPD  #Nicotine use disorder    Patient is wheezing/coughing on exam  Nebulizers as needed and scheduled  If not improved may benefit from course of steroids  Currently on room air, supplemental oxygen as needed  RT consult  Nicotine patch and gum while hospitalized  Mucinex twice daily    #Constipation    Last bowel movement 3 days ago, he does report black stools, however low suspicion for GI bleed  MiraLAX daily  Monitor    #Enlarged left hilar node  #Lung nodules    OP follow up, already referred to lung nodule clinic    Chronic conditions:  #PAD s/p left lower extremity angioplasty  #History AAA  #Hypertension  # Hyperlipidemia  #BPH  #Macular degeneration  #Cataracts    Continue with patient's home medications once entered by nursing/pharmacy    #DVT prophylaxis  SCDs  Lovenox subcutaneous      Nick Duarte, APRN-CNP

## 2024-02-23 NOTE — PROGRESS NOTES
Occupational Therapy    Evaluation and MoCA    Patient Name: Oni Newsome  MRN: [id number]  Today's Date: 2/23/2024  Time Calculation  Start Time: 0905  Stop Time: 0935  Time Calculation (min): 30 min    Assessment  IP OT Assessment  Prognosis: Good  End of Session Communication: Bedside nurse (Discussed with RN results of MoCA and recommendation for 24 hour support for safety including full medication management.)  End of Session Patient Position: Bed, 2 rail up, Alarm on (call light in reach)    Plan:  Treatment Interventions: ADL retraining, Functional transfer training, UE strengthening/ROM, Endurance training, Cognitive reorientation, Neuromuscular reeducation  OT Frequency: 3 times per week  OT Discharge Recommendations: Moderate intensity level of continued care, 24 hr supervision due to cognition  OT - OK to Discharge: Yes (from an O.T. standpoint)    Subjective     Current Problem:  1. Dizziness  Transthoracic Echo (TTE) Complete    Transthoracic Echo (TTE) Complete      2. Chest pain, unspecified type  Transthoracic Echo (TTE) Complete    Transthoracic Echo (TTE) Complete      3. Suicidal ideation        4. Other chest pain  Transthoracic Echo (TTE) Complete          General:  General  Reason for Referral: OT eval & treat for ADLs/functional cognitive eval, MoCA  Referred By: BHAVNA Keys-CNP  Past Medical History Relevant to Rehab: 2/22/24: chest pain, cough, dizziness, suicidal ideation.    CT head: negative   PMH:  HTN, hyperlipidemia, alzheimers dementia, AAA, PAD, BPH, macular degeneration, obesity, SDH, cervical radiculopathy, depression, anxiety, lumbar spondylosis  Prior to Session Communication: Bedside nurse  Patient Position Received: Bed, 2 rail up, Alarm on    Precautions:  Precautions Comment: Activity: OOB with assist, fall/safety, bed alarm, high fall risk,      Pain:  Pain Assessment  Pain Assessment: 0-10  Pain Score: 10 - Worst possible pain  Pain Type: Chronic pain  Pain  "Location: Back  Pain Interventions:  (positioned for comfort)    Objective     Cognition:   Cognition Test Scores  Cognition Tests:  (Patient scored 17/30 on MoCA; Original scoresheet placed in medical chart. See outcome measures for specifics.)        Home Living:  Home Living Comments: Lives alone in 1st floor apartment; Wife in nursing home. Patient reports independent ADLs, transfers & mobility with rollator; states he is unable to manage IADLs; \"barely drives\", not eating or taking care of self. Used tub/shower with grab bar, seat & hand held shower.          ADL:  Grooming Assistance: Stand by  UE Dressing Assistance: Stand by  LE Dressing Assistance: Moderate  Toileting Assistance with Device: Minimal    Activity Tolerance:  Endurance: Decreased tolerance for upright activites    Bed Mobility/Transfers:   Bed Mobility  Bed Mobility:  (SBA supine <-> sit)  Transfers  Transfer:  (mod assist sit to stand from edge of bed;  CGA sit to stand from toilet with grab bar.)    Ambulation/Gait Training:  Ambulation/Gait Training  Ambulation/Gait Training Performed:  (Min assist for balance/safety during mobility with wheeled walker. Unsteady, requires moderate verbal cues for safety & hand placement; high fall risk)    Sitting Balance:  Static Sitting Balance  Static Sitting-Level of Assistance: Distant supervision  Dynamic Sitting Balance  Dynamic Sitting-Balance:  (CGA with UE support)    Sensation:  Sensation Comment: chronic numbness/tingling B hands    Strength:  Strength Comments: BUE WFL, MMT 4-/5    Coordination:  Movements are Fluid and Coordinated: Yes       Outcome Measures: [facility] Daily Activity  Putting on and taking off regular lower body clothing: A lot  Bathing (including washing, rinsing, drying): A little  Putting on and taking off regular upper body clothing: A little  Toileting, which includes using toilet, bedpan or urinal: A little  Taking care of personal grooming such as brushing teeth: A " little  Eating Meals: A little  Daily Activity - Total Score: 17          MoCA  Visuospatial/Executive: 3  Naming: 3  Memory (Score '0' as this is an Unscored Section): 0  Attention: Read List of Digits: 2  Attention: Read List of Letters: 0  Attention: Serial Sevens: 0  Language: Repeat: 0  Language: Fluency: 0  Abstraction: 1  Delayed Recall: 2 (MIS 10/15)  Orientation: 5  Add 1 Point if </=12 yr Education: 1  MOCA Total Score: 17    EDUCATION:  Education  Individual(s) Educated: Patient  Education Provided: Fall precautons, POC discussed and agreed upon  Patient/Caregiver Demonstrated Understanding: yes  Education Documentation  No documentation found.  Education Comments  No comments found.        Goals:   Encounter Problems       Encounter Problems (Active)       OT Goals       Increase LE dressing to supervision with adaptive equipment prn       Start:  02/23/24    Expected End:  03/08/24            Increase dynamic sit and stand balance to supervision with UE support to promote increased safety with ADLs       Start:  02/23/24    Expected End:  03/08/24            Increase functional transfers to/from bed, chair & commode to supervision with DME for safety        Start:  02/23/24    Expected End:  03/08/24            Demonstrate compliance to safety techs during ADLs with min cues       Start:  02/23/24    Expected End:  03/08/24

## 2024-02-23 NOTE — PROGRESS NOTES
Physical Therapy    Physical Therapy Evaluation    Patient Name: Oni Newsome  MRN: [id number]  Today's Date: 2/23/2024   Time Calculation  Start Time: 0904  Stop Time: 0920  Time Calculation (min): 16 min    Assessment/Plan   PT Assessment  PT Assessment Results: Decreased strength, Decreased endurance, Impaired balance, Decreased mobility, Decreased safety awareness  End of Session Communication: Bedside nurse  End of Session Patient Position:  (Sitting up EOB with alarm on and OT present, all needs in reach)  IP OR SWING BED PT PLAN  Inpatient or Swing Bed: Inpatient  PT Plan  Treatment/Interventions: Bed mobility, Transfer training, Gait training  PT Plan: Skilled PT  PT Frequency: 3 times per week  PT Discharge Recommendations: Moderate intensity level of continued care  PT - OK to Discharge: Yes (once cleared by medical team)    Subjective     Current Problem:  Patient Active Problem List   Diagnosis    Dizziness     General Visit Information:  General  Reason for Referral: PT Eval and Treat  Referred By: BHAVNA Keys-CNP  Past Medical History Relevant to Rehab: 2/22/24: chest pain, cough, dizziness, suicidal ideation.  CT head: negative (PMH:  HTN, hyperlipidemia, alzheimers dementia, AAA, PAD, BPH, macular degeneration, obesity, SDH, cervical radiculopathy, depression, anxiety, lumbar spondylosis,)  Prior to Session Communication: Bedside nurse  Patient Position Received: Bed, 2 rail up, Alarm on (Agreeable to PT)    Home Living:  Home Living  Home Living Comments: Pt lives alone in an apt with 6 [address] with HR, 1 story. Bathroom has a bathtub/shower with seat and grab bar, standard height toilet.    Prior Level of Function:  Prior Function Per Pt/Caregiver Report  Level of [place]: Independent with ADLs and functional transfers (Pt amb with rollator, reports not doing any IADLs and does not have anyone do them either, pt states he doesn't know how he gets food, (+)  driving)    Precautions:  Precautions  Precautions Comment: High Fall Risk, poor safety awareness    Objective     Pain:  Pain Assessment  Pain Assessment:  (10/10 back pain, repositioned for comfort s/p session, RN aware)    Cognition:  Cognition  Overall Cognitive Status: Impaired    General Assessments:  Sensation  Light Touch: No apparent deficits  Strength  Strength Comments: B LE ROM WFL, B LE strength 4-/5  Dynamic Standing Balance  Dynamic Standing-Comments: Poor+ with RW and assist x 1    Functional Assessments:  Bed Mobility  Bed Mobility:  (supine to sitting: SBA)  Transfers  Transfer:  (STS from EOB: mod A, STS from commode with use of grab bar: CGA. Pt does require consistent cueing for safety awareness due to impulsivity.)  Ambulation/Gait Training  Ambulation/Gait Training Performed:  (Pt amb 10' x 2 using RW with min A x 1 with max VCs due to impaired safety awareness, impulsivity, and impaired gait mechanics with increased sway increasing his risk of falls.)    Outcome Measures:  [facility] Basic Mobility  Turning from your back to your side while in a flat bed without using bedrails: A little  Moving from lying on your back to sitting on the side of a flat bed without using bedrails: A little  Moving to and from bed to chair (including a wheelchair): A lot  Standing up from a chair using your arms (e.g. wheelchair or bedside chair): A lot  To walk in hospital room: A little  Climbing 3-5 steps with railing: A lot  Basic Mobility - Total Score: 15    Goals:  Encounter Problems       Encounter Problems (Active)       PT Problem       STG - Pt will transition supine <> sitting with SBA  (Progressing)       Start:  02/23/24    Expected End:  03/08/24            STG - Pt will transfer STS with SBA  (Progressing)       Start:  02/23/24    Expected End:  03/08/24            STG - Pt will amb 30' using RW with CGA  (Progressing)       Start:  02/23/24    Expected End:  03/08/24                 Education  Documentation  Precautions, taught by Adelina Snider, PT at 2/23/2024 12:37 PM.  Learner: Patient  Readiness: Acceptance  Method: Explanation  Response: Needs Reinforcement    Mobility Training, taught by Adelina Snider PT at 2/23/2024 12:37 PM.  Learner: Patient  Readiness: Acceptance  Method: Explanation  Response: Needs Reinforcement    Education Comments  No comments found.

## 2024-02-23 NOTE — CONSULTS
Inpatient consult to Cardiology  Consult performed by: Modesto Turner DO  Consult ordered by: Nick Duarte, APRN-CNP  Reason for consult: chest pain        Cardiology Initial Consult Note    Patient Name: Oni Newsome   YOB: 1950    Subjective:  Oni Newsome is a 73 y.o. male with past medical history of Alzheimer's dementia, hypertension, hyperlipidemia, COPD, nicotine use disorder, AAA, PAD s/p angioplasty LLE and left great toe amputation, BPH, and macular degeneration who presented to [facility] on 2/22/24 with ED dizziness, chest pain and back pain. In the ED also endorsed fear of hurting himself because he lives alone. He wants to be placed with his wife at [facility] monae. Currently denies any thoughts of suicide, fevers, SOB, abdomen pain, urinary symptoms, and. bloody stools. Current smoker 1 PPD.    In the ED, afebrile, SpO2 95% on room air. EKG showed sinus bradycardia, ventricular rate 59 bpm, no acute ST changes. CXR showed no acute process. In the ED he received Toradol, meclizine, and Zofran. WBC 6.8, hemoglobin 12.4/hematocrit 36.4 (stable at baseline), platelets 258. CMP was unremarkable. Magnesium 2.07. Lactate 0.7. BNP 32. High-sensitivity troponin 7. Influenza, RSV, and coronavirus negative. UA negative for infection. Drug tox negative. CTA chest on 2/15/2024 noted newly seen large left hilar node, newly seen bibasilar segmental mucous plugging. Newly seen endplate deformity under L3 and L4 of indeterminate acuity, stable focal bulge right distal common iliac artery which is partially thrombosed.     A 10 point ROS was completed and is negative expect as stated in HPI.     Past Medical History:  Alzheimer's dementia, hypertension, hyperlipidemia, COPD, nicotine use disorder, AAA, PAD s/p angioplasty LLE and left great toe amputation, BPH, and macular degeneration    Past Surgical History:  Great toe amputation    Social History:  long term smoker 1ppd, no illicit  "drug use, no alcohol abuse. [place] vet. Wife in SNF.     Family History:  CAD    Objective:    /62 (BP Location: Left arm, Patient Position: Standing)   Pulse 61   Temp 37 °C (98.6 °F) (Temporal)   Resp 16   Ht 1.727 m (5' 8\")   Wt 63.5 kg (140 lb)   SpO2 95%   BMI 21.29 kg/m²     Physical Exam:  Constitutional: frail, awake, alert, no acute distress  ENMT: mucous membranes moist, EOMI, conjunctivae clear  Head/Neck: normocephalic, atraumatic; supple, trachea midline  Respiratory/Thorax: patent airways, CTAB; no wheezes, rales, or rhonchi  Cardiovascular: RRR, no murmur  Gastrointestinal: soft, nondistended, non-tender, bowel sounds appreciated  Extremities: palpable peripheral pulses, no edema or cyanosis  Neurological: AO x3, no focal deficits  Psychological: appropriate mood and behavior  Skin: warm and dry    Assessment/Plan:    Atypical chest pain  -Patient presented with trop 7, Bnp 32  -Last echo 4/2020 showed EF 65-70% with AV sclerosis; repeat echo today showed the same findings with EF 65-70% and aortic valve sclerosis  -Chest pain was reproducible with palpation of the costosternal anterior rib angles which he endorses as the chest pain he is presenting with; therefore low suspicion for ACS at this time.    Hypertension/Hyperlipidemia   -Home medications include: lisinopril 10 mg daily and Lipitor 80 mg; recommend continuing this medications as BP is well controlled.      I have reviewed and interpreted all lab test imaging studies and documentations from other healthcare providers.    This is a preliminary note, please await attending attestation for a finalized plan.    Modesto Turner DO, PhD  Internal Medicine PGY1    "

## 2024-02-23 NOTE — CONSULTS
"Nutrition : assessment    Reason for Assessment  Reason for Assessment: Admission nursing screening    Visit Reason: chest pain; weakness  Recommendation(s):  Continue regular diet to encourage intake, sending ensure plus high protein at lunch and dinner until intake is established.         Nutrition Assessment    Nutrition History  Food and Nutrient History: Pt was confused about his meals and said no one took his order.  I relayed what he wanted, to the kitchen and they had already taken his order.  I was just happy he had an interest in eating.      Per Flowsheet Percent Meal intake: 100  Dietary Orders (From admission, onward)       Start     Ordered    02/22/24 2252  Adult diet Regular  Diet effective now        Question:  Diet type  Answer:  Regular    02/22/24 2251                     Results from last 7 days   Lab Units 02/23/24  0635 02/22/24  1307   GLUCOSE mg/dL 80 86   SODIUM mmol/L 139 137   POTASSIUM mmol/L 4.2 3.7   CHLORIDE mmol/L 108* 105   CO2 mmol/L 26 27   BUN mg/dL 19 15   CREATININE mg/dL 0.68 0.70   EGFR mL/min/1.73m*2 >90 >90   CALCIUM mg/dL 9.0 9.3   MAGNESIUM mg/dL  --  2.07     No results found for: \"HGBA1C\"      GI per flowsheet:  Gastrointestinal  Gastrointestinal (WDL): Exceptions to WDL  Abdomen Inspection: Soft, Nondistended  Bowel Sounds: All quadrants  Bowel Sounds (All Quadrants): Hyperactive  Last BM Date: 02/19/24 (per pt)  Gastrointestinal Symptoms: Constipation (per pt)  Constipation Precipitating Factors: Dehydration, Diet, Medication (Iron supplements, opioids, antidepressants, etc.)  Current Interventions: Adequate fluid intake  Last bowel movement documented: 02/19/24 (per pt)  PMH: altzheimers; dementia; HTN: COPD: smoker; AAA; PAD; Lt great toe amp; not able to care for himself at home   Allergies: Gabapentin     Anthropometrics:  Height: 172.7 cm (5' 7.99\")  Weight: 63.5 kg (139 lb 15.9 oz)  BMI (Calculated): 21.29  IBW: 75 kg  %IBW: 84 %      Weight History / % Weight " Change: Pt said that his is down 40 lbs over the past year.  22%.   Timeline questionable   Interpretation of Weight Loss: 20% in 1 year         Estimated Nutritional Needs:  Method for Estimating Needs: 4735-2101  MSJ    Method for Estimating Needs: 75-90  1-1.2 gm kg of IBW    Method for Estimating Needs: 0942-8731  20-30 ml kg of IBW  as medically indicated    Nutrition Focused Physical Findings:   Orbital Fat Pads: Mild-Moderate (slight dark circles and slight hollowing)  Buccal Fat Pads: Mild-Moderate (flat cheeks, minimal bounce)  Triceps: Mild-moderate (less than ample fat tissue)    Temporalis: Mild-Moderate (slight depression)  Pectoralis (Clavicular Region): Mild-Moderate (some protrusion of clavicle)  Deltoid/Trapezius: Mild-Moderate (slight protrusion of acromion process)  Interosseous: Mild-Moderate (slightly depressed area between thumb and forefinger)        Pain Score: 3     Nutrition Diagnosis   Patient has Malnutrition Diagnosis: Yes  Diagnosis Status: New  Malnutrition Diagnosis: Moderate malnutrition related to chronic disease or condition  As Evidenced by: moderate muscle and fat loss noted on physical exam.  < 75% energy intake compared to estimated needs for > 1 month.  Pt is 84% of his IBW,  has dementia and is living by himself up until now          Nutrition Interventions/Recommendations   Interventions        Individualized Nutrition Prescription Provided for : Continue regular diet to encourage intake,  sending ensure plus high protein at lunch and dinner until intake is established.    Nutrition Monitoring and Evaluation   Biochemical Data, Medical Tests and Procedures  Monitoring and Evaluation Plan: Electrolyte/renal panel, Glucose/endocrine profile  Food/Nutrient Related History Monitoring  Monitoring and Evaluation Plan: Energy intake, Fluid intake, Amount of food    Progress towards goals: Partially Met    Education Documentation  No documentation found.         Time Spent (min): 45  minutes  Last Date of Nutrition Visit: 02/23/24  Nutrition Follow-Up Needed?: 3-5 days  Follow up Comment: 2/28  MZ

## 2024-02-23 NOTE — PROGRESS NOTES
24 1515   Discharge Planning   Living Arrangements Alone   Support Systems Friends/neighbors;Children   Assistance Needed None   Type of Residence Private residence   Do you have animals or pets at home? No   Who is requesting discharge planning? Provider     TCC Note: Spoke with pt regarding Discharge Assessment. SW also saw pt earlier today. Verified pt's name, , demographics, insurance and PCP as Dr. Freed. Pt lives alone and states that he was previously Independent and driving. Pt states that he ambulates with a walker. Pharmacy is [email or web address] on [address]. Pt possible to discharge to SNF due to PT/OT AMPACs of 15 and 17, respectively. MD was messaged to see if pt would meet criteria to be flipped to Inpatient status. Have not heard back yet. KENROY was also added to the thread. Will follow. Joselin Falcon, MSN, RN, TCC.

## 2024-02-23 NOTE — CONSULTS
"Inpatient consult to Neurology  Consult performed by: Ruth Robertson MD  Consult ordered by: BHAVNA Keys-CNP        History Of Present Illness  This is a 74 yo M with underlying Alzheimer's dementia, hypertension, hyperlipidemia, COPD, nicotine use disorder, AAA, PAD s/p angioplasty LLE and left great toe amputation, BPH, and macular degeneration who presented to the ED with chief complaint of dizziness, states his balance is \"off\", and left sided headaches associated with blurry vision, nauseas, and burping. Feeling weak. Eating poorly. Symptoms on going for the past 6 months. Patient lives alone. His wife is at a [facility] due to dementia. In the ED also c/o chest pain and suicidal ideations- having thoughts about hurting himself because he could not live alone. He wants to be placed with his wife. Currently denies any thoughts of suicide, fevers, SOB, abdomen pain, urinary symptoms, and. bloody stools. Current smoker. Reviewed home meds, patient is taking aspirin. Not taking plavix.  The patient states that he does use a walker to ambulate and is quite unsteady when walking.    ED course: Lipid panel: LDL 50. Troponins negative. BNP 32. TTE EF 65-70%, aortic sclerosis. MR brain and MRA head and neck pending. CT head from 2/15/24 - No acute intracranial abnormality. Near complete resolution of left-sided subdural hematoma with trace residual bandlike thickening.    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    .    Past Medical History  Past Medical History:   Diagnosis Date    Abdominal pain 02/19/2024    Abnormal weight loss 02/19/2024    Activity, unspecified 02/19/2024    Acute encephalopathy 02/19/2024    Age-related nuclear cataract, bilateral 02/19/2024    Age-related physical debility 02/19/2024    Atherosclerosis of artery of left lower extremity (CMS/HCC) 02/19/2024    Atherosclerosis of native arteries of extremities with rest pain, " left leg (CMS/[facility]) 02/19/2024    Benign prostatic hyperplasia 05/02/2002    Carbuncle 02/19/2024    Cellulitis 02/18/2023    Cervical radiculopathy 02/19/2024    Chronic obstructive pulmonary disease (CMS/[facility]) 02/19/2024    Chronic pain 02/19/2024    Constipation 02/19/2024    Degeneration of cervical intervertebral disc 02/19/2024    Delusional disorder (CMS/[facility]) 02/19/2024    Dementia (CMS/[facility]) 02/19/2024    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 (CMS/[facility]) 02/19/2024    Dementia with behavioral disturbance (CMS/[facility]) 02/19/2024    Depressive disorder 05/02/2002    Financial insecurity 02/19/2024    Gangrene (CMS/[facility]) 02/19/2024    Gastritis due to Helicobacter species 02/19/2024    Hematemesis 02/19/2024    High cholesterol     Hyperammonemia (CMS/[facility]) 02/19/2024    Hyperlipidemia 02/19/2024    Hyperlipidemia 02/19/2024    Hypertension     Hypokalemia 02/19/2024    Hyponatremia 02/19/2024    Impotence of organic origin 02/19/2024    Low back pain 02/19/2024    Low back pain, unspecified 02/19/2024    Lumbar radiculopathy 02/19/2024    Lumbar spondylosis 02/19/2024    Lumbar stenosis with neurogenic claudication 02/19/2024    Macular degeneration 02/19/2024    Mild cognitive impairment of uncertain or unknown etiology 02/19/2024    Mixed hyperlipidemia 05/02/2002    Morbid obesity (CMS/[facility]) 05/02/2002    Multiple nodules of lung 02/19/2024    Neurosyphilis 02/19/2024    Nicotine dependence 02/19/2024    Nicotine dependence, unspecified, uncomplicated 02/19/2024    Noncompliance with treatment 02/19/2024    Obesity 02/19/2024    Obesity with body mass index 30 or greater 02/19/2024    PAD (peripheral artery disease) (CMS/[facility]) 02/24/2023    Polyp of colon 02/19/2024    Primary hypertension 02/19/2024    Prolonged Q-T interval on ECG 02/19/2024    Proteinuria 02/19/2024    Psychosis (CMS/[facility]) 02/19/2024    Psychosis, paranoid, involutional (CMS/[facility]) 02/19/2024    Radiculopathy, lumbar region 02/19/2024    Rash  and other nonspecific skin eruption 02/19/2024    Subacute subdural hematoma (CMS/HCC) 02/19/2024    Subdural hemorrhage (CMS/[facility]) 02/19/2024    Supraventricular tachycardia 02/19/2024    Tobacco use disorder 05/02/2002    Unspecified dementia, moderate, with anxiety (CMS/[facility]) 02/19/2024    Unspecified dementia, unspecified severity, with agitation (CMS/[facility]) 02/19/2024    Urinary obstruction 02/19/2024     Surgical History  Past Surgical History:   Procedure Laterality Date    TOE AMPUTATION     Cataracts. LLE angioplasty.     Social History  Social History     Tobacco Use    Smoking status: Every Day     Packs/day: 1     Types: Cigarettes    Smokeless tobacco: Never   Substance Use Topics    Alcohol use: Not Currently    Drug use: Never     Family History  Mother history of cardiac problems, MI.     Allergies  Gabapentin    Medications Prior to Admission   Medication Sig Dispense Refill Last Dose    acetaminophen (Tylenol) 325 mg tablet Take 2 tablets (650 mg) by mouth every 6 hours if needed.   2/22/2024    albuterol 90 mcg/actuation inhaler Inhale 2 puffs every 6 hours if needed.   2/22/2024    aspirin 81 mg chewable tablet Chew 1 tablet (81 mg) once daily at bedtime.   2/21/2024    atorvastatin (Lipitor) 80 mg tablet Take 1 tablet (80 mg) by mouth once daily at bedtime.   2/21/2024    cholecalciferol (Vitamin D-3) 50 MCG (2000 UT) tablet Take 1 tablet (2,000 Units) by mouth once daily.   2/22/2024    clopidogrel (Plavix) 75 mg tablet Take 1 tablet (75 mg) by mouth once daily.   2/21/2024    donepezil (Aricept) 10 mg tablet Take 1 tablet (10 mg) by mouth once daily at bedtime.   2/21/2024    ibuprofen 600 mg tablet Take 1 tablet (600 mg) by mouth every 8 hours if needed.   Past Week    lisinopril 10 mg tablet Take 1 tablet (10 mg) by mouth once daily.   2/21/2024    multivitamin with minerals (multivit-min-iron fum-folic ac) tablet Take 1 tablet by mouth once daily.   2/21/2024    pregabalin (Lyrica) 50 mg  "capsule Take 1 capsule (50 mg) by mouth 2 times a day.   2/22/2024    sennosides (Senokot) 8.6 mg tablet Take 1 tablet (8.6 mg) by mouth once daily at bedtime.   2/21/2024    UNABLE TO FIND Take 1 capsule by mouth 2 times a day. Med Name: SciatiEase          Review of Systems  A 12 point review of systems was performed and all systems were negative/normal except what is noted in the HPI. Please refer to the HPI for details.    Neurological Exam  Physical Exam  Constitutional: alert and oriented x3, no acute distress, low weight.  Eyes: PERRL, EOMI, clear sclera.  Head/Neck: Neck supple, no apparent injury, No JVD, no bruits.  Respiratory: clear breath sounds, no wheezes or rhonchi.   Cardiovascular: regular rate and rhythm, no murmurs.   Gastrointestinal: soft abdomen, non-tender, no rebound.  Genitourinary: no giordano.  Musculoskeletal: no joint swelling.   Extremities: no LE edema.  Skin: warm and dry, no rashes.        Last Recorded Vitals  Blood pressure 122/62, pulse 61, temperature 37 °C (98.6 °F), temperature source Temporal, resp. rate 16, height 1.727 m (5' 8\"), weight 63.5 kg (140 lb), SpO2 95 %.  The patient is a well developed, [well-nourished] [male] in no acute distress.    The patient's funduscopic examination shows no papilledema bilaterally.    The patient's extremity examination shows that the pulses are 2+ in the upper and lower extremities bilaterally and there is no edema in the lower extremities bilaterally.    The patient's mental status testing is alert and oriented ×3 with no evidence of aphasia or dysarthria.  The patient's memory testing, fund of knowledge and concentration are all within normal limits.  The patient is able to number 3 out of 3 objects at 5 minutes.  The patient's cranial nerves 2, 3, 4, 5, 6, 7, 8, 9, 10, 11 and 12 are all within normal limits.  The patient's motor testing shows normal tone and bulk with at least 5-/5 power in the upper and lower extremities bilaterally.  " The patient does have giveaway weakness of all of his extremities with strength testing.  The patient's sensory testing is intact to light touch in the upper and lower extremities bilaterally.  The patient's cerebellar testing is intact in the upper and lower extremities bilaterally.  The patient's station and gait were not tested as the patient is a high fall risk.  The patient's reflexes are 1+ in the upper and lower extremities and symmetrical.    Relevant Results        [place] Coma Scale  Best Eye Response: Spontaneous  Best Verbal Response: Oriented  Best Motor Response: Follows commands  [place] Coma Scale Score: 15        Scheduled medications  aspirin, 81 mg, oral, Daily  diazePAM, 10 mg, oral, Once  enoxaparin, 40 mg, subcutaneous, q24h  ipratropium-albuteroL, 3 mL, nebulization, TID  nicotine, 1 patch, transdermal, Daily   Followed by  [START ON 4/5/2024] nicotine, 1 patch, transdermal, Daily   Followed by  [START ON 4/19/2024] nicotine, 1 patch, transdermal, Daily  perflutren lipid microspheres, 0.5-10 mL of dilution, intravenous, Once in imaging  polyethylene glycol, 17 g, oral, Daily      Continuous medications     PRN medications  PRN medications: acetaminophen **OR** acetaminophen **OR** acetaminophen, albuterol, guaiFENesin, nicotine polacrilex, oxygen, traMADol    Results for orders placed or performed during the hospital encounter of 02/22/24 (from the past 96 hour(s))   ECG 12 lead   Result Value Ref Range    Ventricular Rate 59 BPM    Atrial Rate 59 BPM    UT Interval 162 ms    QRS Duration 96 ms    QT Interval 464 ms    QTC Calculation(Bazett) 459 ms    P Axis 51 degrees    R Axis 27 degrees    T Axis 48 degrees    QRS Count 10 beats    Q Onset 217 ms    P Onset 136 ms    P Offset 190 ms    T Offset 449 ms    QTC Fredericia 461 ms   Sars-CoV-2 PCR   Result Value Ref Range    Coronavirus 2019, PCR Not Detected Not Detected   Influenza A, and B PCR   Result Value Ref Range    Flu A Result Not  no Detected Not Detected    Flu B Result Not Detected Not Detected   RSV PCR   Result Value Ref Range    RSV PCR Not Detected Not Detected   CBC and Auto Differential   Result Value Ref Range    WBC 6.8 4.4 - 11.3 x10*3/uL    nRBC 0.0 0.0 - 0.0 /100 WBCs    RBC 3.87 (L) 4.50 - 5.90 x10*6/uL    Hemoglobin 12.4 (L) 13.5 - 17.5 g/dL    Hematocrit 36.4 (L) 41.0 - 52.0 %    MCV 94 80 - 100 fL    MCH 32.0 26.0 - 34.0 pg    MCHC 34.1 32.0 - 36.0 g/dL    RDW 15.0 (H) 11.5 - 14.5 %    Platelets 258 150 - 450 x10*3/uL    Neutrophils % 66.9 40.0 - 80.0 %    Immature Granulocytes %, Automated 0.1 0.0 - 0.9 %    Lymphocytes % 22.3 13.0 - 44.0 %    Monocytes % 8.9 2.0 - 10.0 %    Eosinophils % 1.2 0.0 - 6.0 %    Basophils % 0.6 0.0 - 2.0 %    Neutrophils Absolute 4.53 1.60 - 5.50 x10*3/uL    Immature Granulocytes Absolute, Automated 0.01 0.00 - 0.50 x10*3/uL    Lymphocytes Absolute 1.51 0.80 - 3.00 x10*3/uL    Monocytes Absolute 0.60 0.05 - 0.80 x10*3/uL    Eosinophils Absolute 0.08 0.00 - 0.40 x10*3/uL    Basophils Absolute 0.04 0.00 - 0.10 x10*3/uL   Magnesium   Result Value Ref Range    Magnesium 2.07 1.60 - 2.40 mg/dL   Comprehensive metabolic panel   Result Value Ref Range    Glucose 86 74 - 99 mg/dL    Sodium 137 136 - 145 mmol/L    Potassium 3.7 3.5 - 5.3 mmol/L    Chloride 105 98 - 107 mmol/L    Bicarbonate 27 21 - 32 mmol/L    Anion Gap 9 (L) 10 - 20 mmol/L    Urea Nitrogen 15 6 - 23 mg/dL    Creatinine 0.70 0.50 - 1.30 mg/dL    eGFR >90 >60 mL/min/1.73m*2    Calcium 9.3 8.6 - 10.3 mg/dL    Albumin 3.8 3.4 - 5.0 g/dL    Alkaline Phosphatase 53 33 - 136 U/L    Total Protein 6.1 (L) 6.4 - 8.2 g/dL    AST 14 9 - 39 U/L    Bilirubin, Total 0.5 0.0 - 1.2 mg/dL    ALT 10 10 - 52 U/L   Troponin I, High Sensitivity   Result Value Ref Range    Troponin I, High Sensitivity 7 0 - 20 ng/L   B-Type Natriuretic Peptide   Result Value Ref Range    BNP 32 0 - 99 pg/mL   Lactate   Result Value Ref Range    Lactate 0.7 0.4 - 2.0 mmol/L    Acute Toxicology Panel, Blood   Result Value Ref Range    Acetaminophen <10.0 10.0 - 30.0 ug/mL    Salicylate  <3 4 - 20 mg/dL    Alcohol <10 <=10 mg/dL   Drug Screen, Urine   Result Value Ref Range    Amphetamine Screen, Urine Presumptive Negative Presumptive Negative    Barbiturate Screen, Urine Presumptive Negative Presumptive Negative    Benzodiazepines Screen, Urine Presumptive Negative Presumptive Negative    Cannabinoid Screen, Urine Presumptive Negative Presumptive Negative    Cocaine Metabolite Screen, Urine Presumptive Negative Presumptive Negative    Fentanyl Screen, Urine Presumptive Negative Presumptive Negative    Opiate Screen, Urine Presumptive Negative Presumptive Negative    Oxycodone Screen, Urine Presumptive Negative Presumptive Negative    PCP Screen, Urine Presumptive Negative Presumptive Negative   Urinalysis with Reflex Culture and Microscopic   Result Value Ref Range    Color, Urine Klaudia (N) Straw, Yellow    Appearance, Urine Hazy (N) Clear    Specific Gravity, Urine 1.026 1.005 - 1.035    pH, Urine 5.0 5.0, 5.5, 6.0, 6.5, 7.0, 7.5, 8.0    Protein, Urine 30 (1+) (N) NEGATIVE mg/dL    Glucose, Urine NEGATIVE NEGATIVE mg/dL    Blood, Urine NEGATIVE NEGATIVE    Ketones, Urine NEGATIVE NEGATIVE mg/dL    Bilirubin, Urine NEGATIVE NEGATIVE    Urobilinogen, Urine 2.0 (N) <2.0 mg/dL    Nitrite, Urine NEGATIVE NEGATIVE    Leukocyte Esterase, Urine NEGATIVE NEGATIVE   Extra Urine Gray Tube   Result Value Ref Range    Extra Tube Hold for add-ons.    Urinalysis Microscopic   Result Value Ref Range    WBC, Urine NONE 1-5, NONE /HPF    RBC, Urine NONE NONE, 1-2, 3-5 /HPF    Mucus, Urine 2+ Reference range not established. /LPF    Calcium Oxalate Crystals, Urine 2+ (A) NONE, 1+ /HPF   CBC   Result Value Ref Range    WBC 7.9 4.4 - 11.3 x10*3/uL    nRBC 0.0 0.0 - 0.0 /100 WBCs    RBC 3.66 (L) 4.50 - 5.90 x10*6/uL    Hemoglobin 11.7 (L) 13.5 - 17.5 g/dL    Hematocrit 35.3 (L) 41.0 - 52.0 %    MCV 96 80 -  100 fL    MCH 32.0 26.0 - 34.0 pg    MCHC 33.1 32.0 - 36.0 g/dL    RDW 15.1 (H) 11.5 - 14.5 %    Platelets 246 150 - 450 x10*3/uL   Basic Metabolic Panel   Result Value Ref Range    Glucose 80 74 - 99 mg/dL    Sodium 139 136 - 145 mmol/L    Potassium 4.2 3.5 - 5.3 mmol/L    Chloride 108 (H) 98 - 107 mmol/L    Bicarbonate 26 21 - 32 mmol/L    Anion Gap 9 (L) 10 - 20 mmol/L    Urea Nitrogen 19 6 - 23 mg/dL    Creatinine 0.68 0.50 - 1.30 mg/dL    eGFR >90 >60 mL/min/1.73m*2    Calcium 9.0 8.6 - 10.3 mg/dL   Lipid Panel   Result Value Ref Range    Cholesterol 100 0 - 199 mg/dL    HDL-Cholesterol 37.4 mg/dL    Cholesterol/HDL Ratio 2.7     LDL Calculated 50 <=99 mg/dL    VLDL 13 0 - 40 mg/dL    Triglycerides 64 0 - 149 mg/dL    Non HDL Cholesterol 63 0 - 149 mg/dL   Transthoracic Echo (TTE) Complete   Result Value Ref Range    AV pk roselyn 1.69 m/s    AV mn grad 6.0 mmHg    LVOT diam 1.80 cm    LV biplane EF 75 %    MV E/A ratio 1.73     LA vol index A/L 25.3 ml/m2    Tricuspid annular plane systolic excursion 2.8 cm    RV free wall pk S' 18.60 cm/s    LVIDd 4.88 cm    RVSP 26.0 mmHg    Aortic Valve Area by Continuity of VTI 2.11 cm2    Aortic Valve Area by Continuity of Peak Velocity 2.03 cm2    AV pk grad 11.4 mmHg    LV A4C EF 76.8               I have personally reviewed the following imaging results Transthoracic Echo (TTE) Complete    Result Date: 2/23/2024    [facility], [address] [place] [id number] [phone] and                                 Fax [phone] TRANSTHORACIC ECHOCARDIOGRAM REPORT  Patient Name:      PETE Sherman Physician:    [id number] Raffaele Crenshaw MD Study Date:        2/23/2024           Ordering Provider:    [id number] SUSANA URIAS MRN/PID:           [id number]            Fellow: Accession#:        [id number]        Nurse: Date of Birth/Age: 1950 / 73      Sonographer:          Houston Garner ACS,                     years                                     RDCS, FASE Gender:            M                   Additional Staff: Height:            172.72 cm           Admit Date:           2/22/2024 Weight:            63.50 kg            Admission Status:     Observation -                                                              Priority discharge BSA / BMI:         1.76 m2 / 21.29     Encounter#:           [id number]                    kg/m2                                        Department Location:  [facility] Blood Pressure: 110 /59 mmHg Study Type:    TRANSTHORACIC ECHO (TTE) COMPLETE Diagnosis/ICD: Other chest pain-R07.89 Indication:    Chest Pain CPT Code:      Echo Complete w Full [id number] Patient History: Pertinent History: Chest Pain. Dizzy. Study Detail: The following Echo studies were performed: 2D, M-Mode, Doppler and               color flow.  PHYSICIAN INTERPRETATION: Left Ventricle: The left ventricular systolic function is normal, with an estimated ejection fraction of 65-70%. There are no regional wall motion abnormalities. The left ventricular cavity size is normal. Spectral Doppler shows an impaired relaxation pattern of left ventricular diastolic filling. Left Atrium: The left atrium is mildly dilated. Right Ventricle: The right ventricle is normal in size. There is normal right ventricular global systolic function. Right Atrium: The right atrium is normal in size. Aortic Valve: The aortic valve is probably trileaflet. There is evidence of mildly elevated transaortic gradients consistent with sclerosis of the aortic valve. There is no evidence of aortic valve regurgitation. The peak instantaneous gradient of the aortic valve is 11.4 mmHg. The mean gradient of the aortic valve is 6.0 mmHg. Mitral Valve: The mitral valve is normal in structure. There is no evidence of mitral valve regurgitation. Tricuspid Valve: The tricuspid valve is structurally normal. No evidence of tricuspid  regurgitation. Pulmonic Valve: The pulmonic valve is not well visualized. There is no indication of pulmonic valve regurgitation. Pericardium: There is no pericardial effusion noted. Aorta: The aortic root is normal.  CONCLUSIONS:  1. Left ventricular systolic function is normal with a 65-70% estimated ejection fraction.  2. Spectral Doppler shows an impaired relaxation pattern of left ventricular diastolic filling.  3. Aortic valve sclerosis. QUANTITATIVE DATA SUMMARY: 2D MEASUREMENTS:                           Normal Ranges: Ao Root d:     3.90 cm    (2.0-3.7cm) LAs:           4.00 cm    (2.7-4.0cm) IVSd:          1.29 cm    (0.6-1.1cm) LVPWd:         0.86 cm    (0.6-1.1cm) LVIDd:         4.88 cm    (3.9-5.9cm) LVIDs:         2.33 cm LV Mass Index: 109.9 g/m2 LV % FS        52.3 % LA VOLUME:                               Normal Ranges: LA Vol A4C:        40.3 ml    (22+/-6mL/m2) LA Vol A2C:        47.2 ml LA Vol BP:         44.5 ml LA Vol Index A4C:  22.9ml/m2 LA Vol Index A2C:  26.9 ml/m2 LA Vol Index BP:   25.3 ml/m2 LA Area A4C:       16.0 cm2 LA Area A2C:       17.0 cm2 LA Major Axis A4C: 5.4 cm LA Major Axis A2C: 5.2 cm LA Volume Index:   23.0 ml/m2 RA VOLUME BY A/L METHOD:                       Normal Ranges: RA Area A4C: 15.0 cm2 M-MODE MEASUREMENTS:                  Normal Ranges: Ao Root: 3.60 cm (2.0-3.7cm) LAs:     4.50 cm (2.7-4.0cm) AORTA MEASUREMENTS:                    Normal Ranges: Asc Ao, d: 3.70 cm (2.1-3.4cm) LV SYSTOLIC FUNCTION BY 2D PLANIMETRY (MOD):                     Normal Ranges: EF-A4C View: 76.8 % (>=55%) EF-A2C View: 74.0 % EF-Biplane:  75.4 % LV DIASTOLIC FUNCTION:                              Normal Ranges: MV Peak E:        0.83 m/s   (0.7-1.2 m/s) MV Peak A:        0.48 m/s   (0.42-0.7 m/s) E/A Ratio:        1.73       (1.0-2.2) MV lateral e'     0.12 m/s MV medial e'      0.09 m/s PulmV Sys Yoni:    78.40 cm/s PulmV Rawls Yoni:   57.30 cm/s PulmV S/D Yoni:    1.40 PulmV A Revs Yoni:  29.10 cm/s MITRAL VALVE:                 Normal Ranges: MV DT: 215 msec (150-240msec) AORTIC VALVE:                                    Normal Ranges: AoV Vmax:                1.69 m/s  (<=1.7m/s) AoV Peak P.4 mmHg (<20mmHg) AoV Mean P.0 mmHg  (1.7-11.5mmHg) LVOT Max Yoni:            1.35 m/s  (<=1.1m/s) AoV VTI:                 40.20 cm  (18-25cm) LVOT VTI:                33.30 cm LVOT Diameter:           1.80 cm   (1.8-2.4cm) AoV Area, VTI:           2.11 cm2  (2.5-5.5cm2) AoV Area,Vmax:           2.03 cm2  (2.5-4.5cm2) AoV Dimensionless Index: 0.83  RIGHT VENTRICLE: RV Basal 3.56 cm RV Mid   2.52 cm TAPSE:   28.3 mm RV s'    0.19 m/s TRICUSPID VALVE/RVSP:                             Normal Ranges: Peak TR Velocity: 2.40 m/s RV Syst Pressure: 26.0 mmHg (< 30mmHg) IVC Diam:         2.20 cm PULMONIC VALVE:                      Normal Ranges: PV Max Yoni: 1.0 m/s  (0.6-0.9m/s) PV Max P.3 mmHg Pulmonary Veins: PulmV A Revs Yoni: 29.10 cm/s PulmV Rawls Yoin:   57.30 cm/s PulmV S/D Yoni:    1.40 PulmV Sys Yoni:    78.40 cm/s  [id number] Raffaele Crenshaw MD Electronically signed on 2024 at 9:35:24 AM  ** Final **     XR chest 2 views    Result Date: 2024  STUDY: Chest Radiographs;  24 at 1:05 PM INDICATION: Cough. COMPARISON: Chest XR 23. ACCESSION NUMBER(S): [id number] ORDERING CLINICIAN: SHIKHA LONG TECHNIQUE:  Frontal and lateral chest. (three images) FINDINGS: CARDIOMEDIASTINAL SILHOUETTE: Cardiomediastinal silhouette is normal in size and configuration.  LUNGS: Lungs are clear.  ABDOMEN: No remarkable upper abdominal findings.  BONES: No acute osseous changes.    No acute process. Signed by Prince Iraheta MD    ECG 12 lead    Result Date: 2024  Sinus bradycardia Cannot rule out Anterior infarct , age undetermined Abnormal ECG When compared with ECG of 15-FEB-2024 14:39, PREVIOUS ECG IS PRESENT    ECG 12 lead    Result Date: 2024  Sinus rhythm See ED provider note  for full interpretation and clinical correlation Confirmed by Juliet Prasad ([id number]) on 2/16/2024 3:04:28 PM    CT head wo IV contrast    Result Date: 2/15/2024  Interpreted By:  Fidencio Zhu, STUDY: CT HEAD WO IV CONTRAST;  2/15/2024 5:01 pm   INDICATION: Trauma. Signs/Symptoms:hx of subdural, dizziness, on plavix.   COMPARISON: None.   ACCESSION NUMBER(S): [id number]   ORDERING CLINICIAN: KIAN MCINTYRE   TECHNIQUE: Axial noncontrast head CT   FINDINGS: The ventricles, cisterns and sulci are prominent, consistent with mild diffuse volume loss. There are areas of nonspecific white matter hypodensity, which are probably age-related or microvascular in nature.   Gray-white differentiation is intact and there is no evidence of acute cortical infarct. No acute mass, mass effect or midline shift is seen. There is no evidence of acute hemorrhage. Pre-existing left subdural collection is nearly completely resolved with 2 mm residual bandlike thickening overlying the cerebral convexity coronal series 205, image 42 compared with 9 mm. No significant remaining right-sided subdural abnormality.   The visualized paranasal sinuses are clear.   Calvarium: No detected fracture.   Brain Injury (BIG) guidelines CT values:   Skull fracture: No SDH (subdural hematoma): None detected EDH (epidural hemtoma): None detected IPH (intraparenchymal hemorrhage): None detected SAH (subarachnoid hemorrhage): None detected IVH (intraventricular hemorrhage): No   Reference: Sahil B, Bindu RS, Stephanie M, et al. The BIG (brain injury guidelines) project: defining the management of traumatic brain injury by acute care surgeons. J Trauma Acute Care Surg. 2014;76:367b713.       No acute intracranial abnormality was identified.   Near complete resolution of left-sided subdural hematoma with trace residual bandlike thickening.   No significant remaining right-sided subdural collection.   MACRO: None     Signed by: Fidencio Zhu  2/15/2024 6:31 PM Dictation workstation:   [id number]    CT angio chest abdomen pelvis    Result Date: 2/15/2024  Interpreted By:  Fidencio Zhu, STUDY: CT ANGIO CHEST ABDOMEN PELVIS;  2/15/2024 5:01 pm   INDICATION: Signs/Symptoms:hx of AAA, chest pain, back pain.   COMPARISON: April 6, 2018 CT chest abdomen and pelvis. July 27, 2019 CT abdomen and pelvis   ACCESSION NUMBER(S): [id number]   ORDERING CLINICIAN: KIAN MCINTYRE   TECHNIQUE: Axial non-contrast images of the chest, abdomen, and pelvis  with coronal and sagittal reformatted images. Axial CT images of the chest, abdomen and pelvis after the intravenous administration of 90 mL of Omnipaque 350 using CT angiographic technique with coronal and sagittal reformatted images.  MIP images were provided and reviewed. 3D reconstructions were performed on a separate independent workstation.   FINDINGS: Likely technically adequate although there is image degradation related to motion limiting the accuracy especially the abdominal portion.   VASCULAR:   PULMONARY ARTERIES:   No gross central pulmonary embolism. Limited enhancement of the peripheral vessels related to technique.   THORACIC AORTA:  Non-contrast images show no evidence of acute intramural hematoma. No thoracic aortic aneurysm or dissection. Stable scattered atherosclerosis.   ABDOMINAL AORTA: No abdominal aortic dissection. Stable focal bulge of the distal right common iliac artery measuring 14 mm transverse diameter with eccentric soft tissue density and calcified plaque series 501, image 437. there is moderate atherosclerosis abdominal aorta and branch vessels.   ABDOMINAL AND PELVIC ARTERIES: No hemodynamically significant stenosis or occlusion.     CHEST:   MEDIASTINUM AND LYMPH NODES: Interval enlargement of left hilar lymph node which measures 2.4 x 1.8 cm series 501, image 119 not evident on prior chest CT.   Heart: Stable appearance. Scattered coronary artery calcifications. No  significant pericardial effusion.   LUNG, PLEURA, LARGE AIRWAYS: Allowing for difference in degree of lung expansion the overall aeration is similar with persistent mild mosaic attenuation and scattered multifocal small pulmonary nodules some of which are clustered including adjacent 5 mm nodules left lower lobe series 21, image 185 and 5 mm nodule right base image 211. No consolidative pneumonia caroline edema or effusion. Bibasilar subsegmental mucous plugging. Stable postinflammatory calcifications left hilar region.   OSSEOUS STRUCTURES/CHEST WALL:    No acute osseous abnormality.     ABDOMEN/PELVIS:   Arterial phase imaging limits evaluation of the solid organs.   ABDOMINAL WALL: Within normal limits.   LIVER: No significant abnormality. BILE DUCTS: No significant abnormality. GALLBLADDER: No significant abnormality.   PANCREAS: No significant abnormality.   SPLEEN: No significant abnormality.   ADRENALS: No significant abnormality.   KIDNEYS, URETERS, BLADDER: No significant abnormality.   VESSELS: See above.  No additional significant abnormality. LYMPH NODES: No pathologic sized nodes by CT criteria. RETROPERITONEUM:  No significant abnormality.   BOWEL: No inflammatory bowel wall thickening or dilatation.  Normal appendix.   PERITONEUM:   No significant ascites, free air, or fluid collection.   REPRODUCTIVE ORGANS: Stable prostatomegaly measuring 6.2 cm transverse diameter indenting the base the urinary bladder.   OSSEOUS STRUCTURES:  No acute osseous abnormality. Compared with July 27, 2019 CT abdomen and pelvis increase superior endplate deformities C3 and C4 compatible with interval Schmorl's nodes and degenerative changes indeterminate acuity.       No thoracic or abdominal aortic aneurysm or dissection.   Newly seen enlarged left hilar node of indeterminate cause. The finding could be due to inflammatory lymph node, or neoplasm. Recommend further evaluation. Consider PET-CT, tissue sampling or short-term  follow-up.   Newly seen bibasilar subsegmental mucous plugging.   Newly seen endplate deformities superior L3 and L4 of indeterminate acuity.   Stable focal bulge right distal common iliac artery which is partially thrombosed.   Additional stable findings as reported.   Signed by: Fidencio Zhu 2/15/2024 6:26 PM Dictation workstation:   [id number]    The patient had an MRI of the lumbar spine in 2022 that showed moderate to severe central stenosis at L4-5.     Assessment/Plan     Impression: The patient is a 73-year-old male with history of multiple medical issues who presents with dizziness.  His neurological examination is abnormal and noted above.  The differential diagnosis for his dizziness includes unsteady gait, cerebral infarction, orthostatic hypotension, BPPV, vestibulopathy and dehydration.  The patient states that he is lost 40 pounds over the last several months    Plan: The patient needs an MRI of the brain without contrast as well as an MRA of the neck and brain.  The patient needs an MRI of the cervical and lumbar spines without contrast.  I will consider a neurosurgical consult after the MRIs have been done.  The patient needs an echocardiogram, lipid panel and hemoglobin A1c.  The patient needs workup for the reversible cause of dementia.  I would like the patient to have a psychiatry evaluation for possible PTSD.  The patient needs to continue to use a walker to ambulate to improve her safety margin.  I believe that the patient would benefit from being placed in a skilled nursing facility.  I would certainly continue his aspirin.  The patient needs to continue stroke risk factor modification.   The patient needs a PT, OT, social service, rehab and speech therapy consult.  The patient needs DVT prophylaxis.  The patient needs neurochecks as per protocol.  I will send the note to Dr. Bean.  Thank you very much for sending me this very interesting consultation.  I discussed all these issues in  detail with the patient and answered all their questions.  I will continue to follow the patient while they are in the hospital.  The patient needs follow-up with their primary care doctor within 2 weeks of discharge.  On discharge, the patient will follow up with me in the office in 4 months.    Ruth Robertson MD

## 2024-02-23 NOTE — CONSULTS
Psych Consult      Consult Requested By: Michael Bean    Reason for Consultation:  PTSD    HISTORY OF PRESENT ILLNESS    Per ED - Oni Newsome is a 73-year-old male with past medical history of Alzheimer's dementia, hypertension, hyperlipidemia, COPD, nicotine use disorder, AAA, PAD s/p angioplasty LLE and left great toe amputation, BPH, macular degeneration, and cataracts.  Patient presents to the hospital with multiple complaints.  He is currently alert and oriented x 3, slow to respond at times with flat affect.  Reports sharp left chest pains, worse with inspiration, daily but not constant.  Activity makes symptoms worse.  Reports generalized weakness, but feels left side is more weak than right.  Dizziness, feels like the room is spinning, started over the past several days.  Denies prior history of vertigo.  He is concerned that he is not able to take care of himself any longer at home.  States he is largely nonambulatory.  Denies support from family/friends.  States he is unable to make his meals has lost significant mental weight.  Feels like he may hurt himself on accident, no suicidal ideation.  ROS additionally positive for headaches, blurry vision, generalized weakness/fatigue, feeling sad/depressed, cough, wheezing, shortness of breath, swollen tender nodes in his neck, low back pain, constipation (last bowel movement 3 days ago), black stools.  Denies wounds, urinary complaints, nausea, vomiting.  States that he is compliant with medications, but is out of his blood thinner (history of being on Plavix).  He would like placed at the skilled nursing facility     ER course: Hemodynamically stable, afebrile, SpO2 95% on room air.  EKG showed sinus bradycardia, ventricular rate 59 bpm, no acute ST changes. CXR showed no acute process. In the ED he received Toradol, meclizine, and Zofran.  WBC 6.8, hemoglobin 12.4/hematocrit 36.4 (stable at baseline), platelets 258.  CMP was unremarkable.  Magnesium  "2.07.  Lactate 0.7.  BNP 32.  High-sensitivity troponin 7.  Influenza, RSV, and coronavirus negative.  UA negative for infection.  Drug tox negative. CTA chest on 2/15/2024 noted newly seen large left hilar node, newly seen bibasilar segmental mucous plugging.  Newly seen endplate deformity under L3 and L4 of indeterminate acuity, stable focal bulge right distal common iliac artery which is partially thrombosed    Per EPAT - Patient is a 73 year old male who came to the ED with chest pain. The patient was being treated and cleared. He stated then that he was having thoughts of self harm because he \"could not live alone\". The patient reports that his wife is in a SNF due to Dementia and is not coming home. He stated he wants to \"live in a SNF to\". We discussed getting him referrals to discuss future options when he clarified \" It has to be with my wife\". The patient was withdrawn. He appeared stressed and anxious. He shared he cannot cook for himself and has lost weight. He stated his wife \"did a lot for me\". He denied any thoughts of suicide during the assessment. He denied any history of suicide attempts. He also denied any homicidal thoughts or hallucinatons. The patient had good eye contact, was future oriented and help seeking.     HPI:  Pt is a 73 yr old CM being seen for depression  Pt is known to provider from past admission to [facility]  Pt states that he is having a hard time living alone. Currently his wife is living in a SNF.   Wife has HX of dementia and is currently in a SNF and is decompensating.  Pt states that he is struggling to take care of himself at home on his own. Pt did make suicidal comments to medical team on admission stating that he \"did not want to live this way\"  Pt feels very frustrated, states that he is having difficulty with ADL's, feeding himself and is tired of living alone. Pt would prefer to live in the same SNF as his wife.   Pt denies wanting to actually harm himself at this time. " Denies having a plan or intent to harm self. States that he made statements out of frustration.  Pt states that he was having difficulty with sleep and appetite but both of those have improved as of late.  Pt is open to trial medication to help with his mood.         PSYCHIATRIC REVIEW OF SYSTEMS  SI: Denies    Depression: Depressed Mood, Worthless, Poor Energy, and Poor Appetite    Mireya: Denies    Panic: Denies    Generalized Anxiety: Denies    PTSD: Denies    OCD: Denies    Psychosis: Denies    Eating Disorder: Denies    Current Outpatient Medications   Medication Instructions    acetaminophen (TYLENOL) 650 mg, oral, Every 6 hours PRN    albuterol 90 mcg/actuation inhaler 2 puffs, inhalation, Every 6 hours PRN    aspirin 81 mg, oral, Nightly    atorvastatin (LIPITOR) 80 mg, oral, Nightly    cholecalciferol (VITAMIN D-3) 2,000 Units, oral, Daily    clopidogrel (PLAVIX) 75 mg, oral, Daily    donepezil (Aricept) 10 mg tablet 1 tablet, oral, Nightly    ibuprofen 600 mg, oral, Every 8 hours PRN    lisinopril 10 mg tablet 1 tablet, oral, Daily    multivitamin with minerals (multivit-min-iron fum-folic ac) tablet 1 tablet, oral, Daily    pregabalin (LYRICA) 50 mg, oral, 2 times daily    sennosides (Senokot) 8.6 mg tablet 1 tablet, oral, Nightly    UNABLE TO FIND 1 capsule, oral, 2 times daily, Med Name: Lopez        OARRS:   [email or web address]    Past Medical History:   Diagnosis Date    Abdominal pain 02/19/2024    Abnormal weight loss 02/19/2024    Activity, unspecified 02/19/2024    Acute encephalopathy 02/19/2024    Age-related nuclear cataract, bilateral 02/19/2024    Age-related physical debility 02/19/2024    Atherosclerosis of artery of left lower extremity (CMS/HCC) 02/19/2024    Atherosclerosis of native arteries of extremities with rest pain, left leg (CMS/HCC) 02/19/2024    Benign prostatic hyperplasia 05/02/2002    Carbuncle 02/19/2024    Cellulitis 02/18/2023    Cervical radiculopathy 02/19/2024    Chronic  obstructive pulmonary disease (CMS/HCC) 02/19/2024    Chronic pain 02/19/2024    Constipation 02/19/2024    Degeneration of cervical intervertebral disc 02/19/2024    Delusional disorder (CMS/[facility]) 02/19/2024    Dementia (CMS/[facility]) 02/19/2024    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 (CMS/[facility]) 02/19/2024    Dementia with behavioral disturbance (CMS/[facility]) 02/19/2024    Depressive disorder 05/02/2002    Financial insecurity 02/19/2024    Gangrene (CMS/[facility]) 02/19/2024    Gastritis due to Helicobacter species 02/19/2024    Hematemesis 02/19/2024    High cholesterol     Hyperammonemia (CMS/[facility]) 02/19/2024    Hyperlipidemia 02/19/2024    Hyperlipidemia 02/19/2024    Hypertension     Hypokalemia 02/19/2024    Hyponatremia 02/19/2024    Impotence of organic origin 02/19/2024    Low back pain 02/19/2024    Low back pain, unspecified 02/19/2024    Lumbar radiculopathy 02/19/2024    Lumbar spondylosis 02/19/2024    Lumbar stenosis with neurogenic claudication 02/19/2024    Macular degeneration 02/19/2024    Mild cognitive impairment of uncertain or unknown etiology 02/19/2024    Mixed hyperlipidemia 05/02/2002    Morbid obesity (CMS/[facility]) 05/02/2002    Multiple nodules of lung 02/19/2024    Neurosyphilis 02/19/2024    Nicotine dependence 02/19/2024    Nicotine dependence, unspecified, uncomplicated 02/19/2024    Noncompliance with treatment 02/19/2024    Obesity 02/19/2024    Obesity with body mass index 30 or greater 02/19/2024    PAD (peripheral artery disease) (CMS/[facility]) 02/24/2023    Polyp of colon 02/19/2024    Primary hypertension 02/19/2024    Prolonged Q-T interval on ECG 02/19/2024    Proteinuria 02/19/2024    Psychosis (CMS/[facility]) 02/19/2024    Psychosis, paranoid, involutional (CMS/[facility]) 02/19/2024    Radiculopathy, lumbar region 02/19/2024    Rash and other nonspecific skin eruption 02/19/2024    Subacute subdural hematoma (CMS/[facility]) 02/19/2024    Subdural hemorrhage (CMS/HCC) 02/19/2024    Supraventricular  tachycardia 02/19/2024    Tobacco use disorder 05/02/2002    Unspecified dementia, moderate, with anxiety (CMS/[facility]) 02/19/2024    Unspecified dementia, unspecified severity, with agitation (CMS/[facility]) 02/19/2024    Urinary obstruction 02/19/2024        Past Surgical History:   Procedure Laterality Date    TOE AMPUTATION          Family History   Problem Relation Name Age of Onset    Heart disease Mother          Social History     Substance and Sexual Activity   Alcohol Use Not Currently        Social History     Substance and Sexual Activity   Drug Use Never        Social History     Tobacco Use   Smoking Status Every Day    Packs/day: 1    Types: Cigarettes   Smokeless Tobacco Never        MENTAL STATUS EXAM  Physical Exam  Psychiatric:         Attention and Perception: Attention normal.         Mood and Affect: Mood is depressed. Affect is flat.         Speech: Speech normal.         Behavior: Behavior normal. Behavior is cooperative.         Thought Content: Thought content normal.         Cognition and Memory: Cognition and memory normal.         Judgment: Judgment normal.         Vitals:    02/23/24 0903 02/23/24 0906 02/23/24 1229 02/23/24 1425   BP: 114/60 122/62  114/56   BP Location: Left arm Left arm  Left arm   Patient Position: Sitting Standing  Sitting   Pulse: 61 61  66   Resp: 16 16  16   Temp: 37 °C (98.6 °F) 37 °C (98.6 °F)  36.9 °C (98.4 °F)   TempSrc: Temporal Temporal  Temporal   SpO2: 96% 95% 96% 97%   Weight:       Height:            Recent Results (from the past 72 hour(s))   ECG 12 lead    Collection Time: 02/22/24 12:28 PM   Result Value Ref Range    Ventricular Rate 59 BPM    Atrial Rate 59 BPM    CA Interval 162 ms    QRS Duration 96 ms    QT Interval 464 ms    QTC Calculation(Bazett) 459 ms    P Axis 51 degrees    R Axis 27 degrees    T Axis 48 degrees    QRS Count 10 beats    Q Onset 217 ms    P Onset 136 ms    P Offset 190 ms    T Offset 449 ms    QTC Fredericia 461 ms   Sars-CoV-2 PCR     Collection Time: 02/22/24 12:58 PM   Result Value Ref Range    Coronavirus 2019, PCR Not Detected Not Detected   Influenza A, and B PCR    Collection Time: 02/22/24 12:58 PM   Result Value Ref Range    Flu A Result Not Detected Not Detected    Flu B Result Not Detected Not Detected   RSV PCR    Collection Time: 02/22/24 12:58 PM   Result Value Ref Range    RSV PCR Not Detected Not Detected   CBC and Auto Differential    Collection Time: 02/22/24  1:07 PM   Result Value Ref Range    WBC 6.8 4.4 - 11.3 x10*3/uL    nRBC 0.0 0.0 - 0.0 /100 WBCs    RBC 3.87 (L) 4.50 - 5.90 x10*6/uL    Hemoglobin 12.4 (L) 13.5 - 17.5 g/dL    Hematocrit 36.4 (L) 41.0 - 52.0 %    MCV 94 80 - 100 fL    MCH 32.0 26.0 - 34.0 pg    MCHC 34.1 32.0 - 36.0 g/dL    RDW 15.0 (H) 11.5 - 14.5 %    Platelets 258 150 - 450 x10*3/uL    Neutrophils % 66.9 40.0 - 80.0 %    Immature Granulocytes %, Automated 0.1 0.0 - 0.9 %    Lymphocytes % 22.3 13.0 - 44.0 %    Monocytes % 8.9 2.0 - 10.0 %    Eosinophils % 1.2 0.0 - 6.0 %    Basophils % 0.6 0.0 - 2.0 %    Neutrophils Absolute 4.53 1.60 - 5.50 x10*3/uL    Immature Granulocytes Absolute, Automated 0.01 0.00 - 0.50 x10*3/uL    Lymphocytes Absolute 1.51 0.80 - 3.00 x10*3/uL    Monocytes Absolute 0.60 0.05 - 0.80 x10*3/uL    Eosinophils Absolute 0.08 0.00 - 0.40 x10*3/uL    Basophils Absolute 0.04 0.00 - 0.10 x10*3/uL   Magnesium    Collection Time: 02/22/24  1:07 PM   Result Value Ref Range    Magnesium 2.07 1.60 - 2.40 mg/dL   Comprehensive metabolic panel    Collection Time: 02/22/24  1:07 PM   Result Value Ref Range    Glucose 86 74 - 99 mg/dL    Sodium 137 136 - 145 mmol/L    Potassium 3.7 3.5 - 5.3 mmol/L    Chloride 105 98 - 107 mmol/L    Bicarbonate 27 21 - 32 mmol/L    Anion Gap 9 (L) 10 - 20 mmol/L    Urea Nitrogen 15 6 - 23 mg/dL    Creatinine 0.70 0.50 - 1.30 mg/dL    eGFR >90 >60 mL/min/1.73m*2    Calcium 9.3 8.6 - 10.3 mg/dL    Albumin 3.8 3.4 - 5.0 g/dL    Alkaline Phosphatase 53 33 - 136 U/L     Total Protein 6.1 (L) 6.4 - 8.2 g/dL    AST 14 9 - 39 U/L    Bilirubin, Total 0.5 0.0 - 1.2 mg/dL    ALT 10 10 - 52 U/L   Troponin I, High Sensitivity    Collection Time: 02/22/24  1:07 PM   Result Value Ref Range    Troponin I, High Sensitivity 7 0 - 20 ng/L   B-Type Natriuretic Peptide    Collection Time: 02/22/24  1:07 PM   Result Value Ref Range    BNP 32 0 - 99 pg/mL   Lactate    Collection Time: 02/22/24  1:07 PM   Result Value Ref Range    Lactate 0.7 0.4 - 2.0 mmol/L   Acute Toxicology Panel, Blood    Collection Time: 02/22/24  4:44 PM   Result Value Ref Range    Acetaminophen <10.0 10.0 - 30.0 ug/mL    Salicylate  <3 4 - 20 mg/dL    Alcohol <10 <=10 mg/dL   Drug Screen, Urine    Collection Time: 02/22/24  6:23 PM   Result Value Ref Range    Amphetamine Screen, Urine Presumptive Negative Presumptive Negative    Barbiturate Screen, Urine Presumptive Negative Presumptive Negative    Benzodiazepines Screen, Urine Presumptive Negative Presumptive Negative    Cannabinoid Screen, Urine Presumptive Negative Presumptive Negative    Cocaine Metabolite Screen, Urine Presumptive Negative Presumptive Negative    Fentanyl Screen, Urine Presumptive Negative Presumptive Negative    Opiate Screen, Urine Presumptive Negative Presumptive Negative    Oxycodone Screen, Urine Presumptive Negative Presumptive Negative    PCP Screen, Urine Presumptive Negative Presumptive Negative   Urinalysis with Reflex Culture and Microscopic    Collection Time: 02/22/24  6:45 PM   Result Value Ref Range    Color, Urine Klaudia (N) Straw, Yellow    Appearance, Urine Hazy (N) Clear    Specific Gravity, Urine 1.026 1.005 - 1.035    pH, Urine 5.0 5.0, 5.5, 6.0, 6.5, 7.0, 7.5, 8.0    Protein, Urine 30 (1+) (N) NEGATIVE mg/dL    Glucose, Urine NEGATIVE NEGATIVE mg/dL    Blood, Urine NEGATIVE NEGATIVE    Ketones, Urine NEGATIVE NEGATIVE mg/dL    Bilirubin, Urine NEGATIVE NEGATIVE    Urobilinogen, Urine 2.0 (N) <2.0 mg/dL    Nitrite, Urine NEGATIVE  NEGATIVE    Leukocyte Esterase, Urine NEGATIVE NEGATIVE   Extra Urine Gray Tube    Collection Time: 02/22/24  6:45 PM   Result Value Ref Range    Extra Tube Hold for add-ons.    Urinalysis Microscopic    Collection Time: 02/22/24  6:45 PM   Result Value Ref Range    WBC, Urine NONE 1-5, NONE /HPF    RBC, Urine NONE NONE, 1-2, 3-5 /HPF    Mucus, Urine 2+ Reference range not established. /LPF    Calcium Oxalate Crystals, Urine 2+ (A) NONE, 1+ /HPF   CBC    Collection Time: 02/23/24  6:35 AM   Result Value Ref Range    WBC 7.9 4.4 - 11.3 x10*3/uL    nRBC 0.0 0.0 - 0.0 /100 WBCs    RBC 3.66 (L) 4.50 - 5.90 x10*6/uL    Hemoglobin 11.7 (L) 13.5 - 17.5 g/dL    Hematocrit 35.3 (L) 41.0 - 52.0 %    MCV 96 80 - 100 fL    MCH 32.0 26.0 - 34.0 pg    MCHC 33.1 32.0 - 36.0 g/dL    RDW 15.1 (H) 11.5 - 14.5 %    Platelets 246 150 - 450 x10*3/uL   Basic Metabolic Panel    Collection Time: 02/23/24  6:35 AM   Result Value Ref Range    Glucose 80 74 - 99 mg/dL    Sodium 139 136 - 145 mmol/L    Potassium 4.2 3.5 - 5.3 mmol/L    Chloride 108 (H) 98 - 107 mmol/L    Bicarbonate 26 21 - 32 mmol/L    Anion Gap 9 (L) 10 - 20 mmol/L    Urea Nitrogen 19 6 - 23 mg/dL    Creatinine 0.68 0.50 - 1.30 mg/dL    eGFR >90 >60 mL/min/1.73m*2    Calcium 9.0 8.6 - 10.3 mg/dL   Lipid Panel    Collection Time: 02/23/24  6:35 AM   Result Value Ref Range    Cholesterol 100 0 - 199 mg/dL    HDL-Cholesterol 37.4 mg/dL    Cholesterol/HDL Ratio 2.7     LDL Calculated 50 <=99 mg/dL    VLDL 13 0 - 40 mg/dL    Triglycerides 64 0 - 149 mg/dL    Non HDL Cholesterol 63 0 - 149 mg/dL   Transthoracic Echo (TTE) Complete    Collection Time: 02/23/24  8:22 AM   Result Value Ref Range    AV pk roselyn 1.69 m/s    AV mn grad 6.0 mmHg    LVOT diam 1.80 cm    LV biplane EF 75 %    MV E/A ratio 1.73     LA vol index A/L 25.3 ml/m2    Tricuspid annular plane systolic excursion 2.8 cm    RV free wall pk S' 18.60 cm/s    LVIDd 4.88 cm    RVSP 26.0 mmHg    Aortic Valve Area by  Continuity of VTI 2.11 cm2    Aortic Valve Area by Continuity of Peak Velocity 2.03 cm2    AV pk grad 11.4 mmHg    LV A4C EF 76.8         Transthoracic Echo (TTE) Complete    Result Date: 2/23/2024    [facility], [facility], [facility] [id number] [phone] and                                 Fax [phone] TRANSTHORACIC ECHOCARDIOGRAM REPORT  Patient Name:      PETE WESLEY      Reading Physician:    [id number] Raffaele Crenshaw MD Study Date:        2/23/2024           Ordering Provider:    [id number] SUSANA URIAS MRN/PID:           [id number]            Fellow: Accession#:        [id number]        Nurse: Date of Birth/Age: 1950 / 73      Sonographer:          [place] WASHINGTON,                    years                                     RDCS, FASE Gender:            M                   Additional Staff: Height:            172.72 cm           Admit Date:           2/22/2024 Weight:            63.50 kg            Admission Status:     Observation -                                                              Priority discharge BSA / BMI:         1.76 m2 / 21.29     Encounter#:           [id number]                    kg/m2                                        Department Location:  [facility] Blood Pressure: 110 /59 mmHg Study Type:    TRANSTHORACIC ECHO (TTE) COMPLETE Diagnosis/ICD: Other chest pain-R07.89 Indication:    Chest Pain CPT Code:      Echo Complete w Full [id number] Patient History: Pertinent History: Chest Pain. Dizzy. Study Detail: The following Echo studies were performed: 2D, M-Mode, Doppler and               color flow.  PHYSICIAN INTERPRETATION: Left Ventricle: The left ventricular systolic function is normal, with an estimated ejection fraction of 65-70%. There are no regional wall motion abnormalities. The left ventricular cavity size is normal. Spectral Doppler shows an impaired relaxation pattern of left  ventricular diastolic filling. Left Atrium: The left atrium is mildly dilated. Right Ventricle: The right ventricle is normal in size. There is normal right ventricular global systolic function. Right Atrium: The right atrium is normal in size. Aortic Valve: The aortic valve is probably trileaflet. There is evidence of mildly elevated transaortic gradients consistent with sclerosis of the aortic valve. There is no evidence of aortic valve regurgitation. The peak instantaneous gradient of the aortic valve is 11.4 mmHg. The mean gradient of the aortic valve is 6.0 mmHg. Mitral Valve: The mitral valve is normal in structure. There is no evidence of mitral valve regurgitation. Tricuspid Valve: The tricuspid valve is structurally normal. No evidence of tricuspid regurgitation. Pulmonic Valve: The pulmonic valve is not well visualized. There is no indication of pulmonic valve regurgitation. Pericardium: There is no pericardial effusion noted. Aorta: The aortic root is normal.  CONCLUSIONS:  1. Left ventricular systolic function is normal with a 65-70% estimated ejection fraction.  2. Spectral Doppler shows an impaired relaxation pattern of left ventricular diastolic filling.  3. Aortic valve sclerosis. QUANTITATIVE DATA SUMMARY: 2D MEASUREMENTS:                           Normal Ranges: Ao Root d:     3.90 cm    (2.0-3.7cm) LAs:           4.00 cm    (2.7-4.0cm) IVSd:          1.29 cm    (0.6-1.1cm) LVPWd:         0.86 cm    (0.6-1.1cm) LVIDd:         4.88 cm    (3.9-5.9cm) LVIDs:         2.33 cm LV Mass Index: 109.9 g/m2 LV % FS        52.3 % LA VOLUME:                               Normal Ranges: LA Vol A4C:        40.3 ml    (22+/-6mL/m2) LA Vol A2C:        47.2 ml LA Vol BP:         44.5 ml LA Vol Index A4C:  22.9ml/m2 LA Vol Index A2C:  26.9 ml/m2 LA Vol Index BP:   25.3 ml/m2 LA Area A4C:       16.0 cm2 LA Area A2C:       17.0 cm2 LA Major Axis A4C: 5.4 cm LA Major Axis A2C: 5.2 cm LA Volume Index:   23.0 ml/m2 RA  VOLUME BY A/L METHOD:                       Normal Ranges: RA Area A4C: 15.0 cm2 M-MODE MEASUREMENTS:                  Normal Ranges: Ao Root: 3.60 cm (2.0-3.7cm) LAs:     4.50 cm (2.7-4.0cm) AORTA MEASUREMENTS:                    Normal Ranges: Asc Ao, d: 3.70 cm (2.1-3.4cm) LV SYSTOLIC FUNCTION BY 2D PLANIMETRY (MOD):                     Normal Ranges: EF-A4C View: 76.8 % (>=55%) EF-A2C View: 74.0 % EF-Biplane:  75.4 % LV DIASTOLIC FUNCTION:                              Normal Ranges: MV Peak E:        0.83 m/s   (0.7-1.2 m/s) MV Peak A:        0.48 m/s   (0.42-0.7 m/s) E/A Ratio:        1.73       (1.0-2.2) MV lateral e'     0.12 m/s MV medial e'      0.09 m/s PulmV Sys Yoni:    78.40 cm/s PulmV Rawls Yoni:   57.30 cm/s PulmV S/D Yoni:    1.40 PulmV A Revs Yoni: 29.10 cm/s MITRAL VALVE:                 Normal Ranges: MV DT: 215 msec (150-240msec) AORTIC VALVE:                                    Normal Ranges: AoV Vmax:                1.69 m/s  (<=1.7m/s) AoV Peak P.4 mmHg (<20mmHg) AoV Mean P.0 mmHg  (1.7-11.5mmHg) LVOT Max Yoni:            1.35 m/s  (<=1.1m/s) AoV VTI:                 40.20 cm  (18-25cm) LVOT VTI:                33.30 cm LVOT Diameter:           1.80 cm   (1.8-2.4cm) AoV Area, VTI:           2.11 cm2  (2.5-5.5cm2) AoV Area,Vmax:           2.03 cm2  (2.5-4.5cm2) AoV Dimensionless Index: 0.83  RIGHT VENTRICLE: RV Basal 3.56 cm RV Mid   2.52 cm TAPSE:   28.3 mm RV s'    0.19 m/s TRICUSPID VALVE/RVSP:                             Normal Ranges: Peak TR Velocity: 2.40 m/s RV Syst Pressure: 26.0 mmHg (< 30mmHg) IVC Diam:         2.20 cm PULMONIC VALVE:                      Normal Ranges: PV Max Yoni: 1.0 m/s  (0.6-0.9m/s) PV Max P.3 mmHg Pulmonary Veins: PulmV A Revs Yoni: 29.10 cm/s PulmV Rawls Yoni:   57.30 cm/s PulmV S/D Yoni:    1.40 PulmV Sys Yoni:    78.40 cm/s  [id number] Raffaele Crenshaw MD Electronically signed on 2024 at 9:35:24 AM  ** Final **     XR chest 2  views    Result Date: 2/22/2024  STUDY: Chest Radiographs;  2/22/24 at 1:05 PM INDICATION: Cough. COMPARISON: Chest XR 7/22/23. ACCESSION NUMBER(S): [id number] ORDERING CLINICIAN: SHIKHA LONG TECHNIQUE:  Frontal and lateral chest. (three images) FINDINGS: CARDIOMEDIASTINAL SILHOUETTE: Cardiomediastinal silhouette is normal in size and configuration.  LUNGS: Lungs are clear.  ABDOMEN: No remarkable upper abdominal findings.  BONES: No acute osseous changes.    No acute process. Signed by Prince Iraheta MD    ECG 12 lead    Result Date: 2/22/2024  Sinus bradycardia Cannot rule out Anterior infarct , age undetermined Abnormal ECG When compared with ECG of 15-FEB-2024 14:39, PREVIOUS ECG IS PRESENT       ASSESSMENT AND PLAN  DX: MDD  PLAN: Start Lexapro 5 mg daily  Plan is for SNF as pt would like to be placed in same facility as wife    Thank you for allowing us to participate in the care of this patient. We will continue to follow-up with you. .    I spent 60 minutes in the professional and overall care of this patient.      Darrell Smith, APRN-CNP

## 2024-02-23 NOTE — PROGRESS NOTES
"Received referral from [facility] re: pt feeling depressed and statements of SI. SW met with pt at bedside to discuss. SW discussed recommendation from PT about rehab need. Pt states that he would like to go to [facility] to get his strength back up. He is future orientated stating that once he gets his strength back up he might be able to continue to live independently at his apartment. Discussed referral to psychiatry or counseling. Pt states that he isn't a \"nut.\" SW discussed stigma surrounding getting mental health care- pt still declining mental health care referrals at this time.    "

## 2024-02-23 NOTE — CARE PLAN
The patient's goals for the shift include sleep well    The clinical goals for the shift include maintain safety      Problem: Pain  Goal: My pain/discomfort is manageable  Outcome: Progressing     Problem: Safety  Goal: Patient will be injury free during hospitalization  Outcome: Progressing  Goal: I will remain free of falls  Outcome: Progressing     Problem: Skin  Goal: Promote/optimize nutrition  Outcome: Progressing  Goal: Promote skin healing  Outcome: Progressing

## 2024-02-24 LAB
ANION GAP SERPL CALC-SCNC: 9 MMOL/L (ref 10–20)
ATRIAL RATE: 59 BPM
BUN SERPL-MCNC: 16 MG/DL (ref 6–23)
CALCIUM SERPL-MCNC: 8.6 MG/DL (ref 8.6–10.3)
CHLORIDE SERPL-SCNC: 105 MMOL/L (ref 98–107)
CO2 SERPL-SCNC: 25 MMOL/L (ref 21–32)
CREAT SERPL-MCNC: 0.6 MG/DL (ref 0.5–1.3)
EGFRCR SERPLBLD CKD-EPI 2021: >90 ML/MIN/1.73M*2
ERYTHROCYTE [DISTWIDTH] IN BLOOD BY AUTOMATED COUNT: 15 % (ref 11.5–14.5)
GLUCOSE SERPL-MCNC: 78 MG/DL (ref 74–99)
HCT VFR BLD AUTO: 34.3 % (ref 41–52)
HGB BLD-MCNC: 11.3 G/DL (ref 13.5–17.5)
MCH RBC QN AUTO: 31.6 PG (ref 26–34)
MCHC RBC AUTO-ENTMCNC: 32.9 G/DL (ref 32–36)
MCV RBC AUTO: 96 FL (ref 80–100)
NRBC BLD-RTO: 0 /100 WBCS (ref 0–0)
P AXIS: 51 DEGREES
P OFFSET: 190 MS
P ONSET: 136 MS
PLATELET # BLD AUTO: 221 X10*3/UL (ref 150–450)
POTASSIUM SERPL-SCNC: 3.8 MMOL/L (ref 3.5–5.3)
PR INTERVAL: 162 MS
Q ONSET: 217 MS
QRS COUNT: 10 BEATS
QRS DURATION: 96 MS
QT INTERVAL: 464 MS
QTC CALCULATION(BAZETT): 459 MS
QTC FREDERICIA: 461 MS
R AXIS: 27 DEGREES
RBC # BLD AUTO: 3.58 X10*6/UL (ref 4.5–5.9)
SODIUM SERPL-SCNC: 135 MMOL/L (ref 136–145)
T AXIS: 48 DEGREES
T OFFSET: 449 MS
VENTRICULAR RATE: 59 BPM
WBC # BLD AUTO: 6.9 X10*3/UL (ref 4.4–11.3)

## 2024-02-24 PROCEDURE — 99233 SBSQ HOSP IP/OBS HIGH 50: CPT | Performed by: INTERNAL MEDICINE

## 2024-02-24 PROCEDURE — 99233 SBSQ HOSP IP/OBS HIGH 50: CPT | Performed by: PSYCHIATRY & NEUROLOGY

## 2024-02-24 PROCEDURE — 2500000001 HC RX 250 WO HCPCS SELF ADMINISTERED DRUGS (ALT 637 FOR MEDICARE OP)

## 2024-02-24 PROCEDURE — 99221 1ST HOSP IP/OBS SF/LOW 40: CPT | Performed by: NEUROLOGICAL SURGERY

## 2024-02-24 PROCEDURE — S4991 NICOTINE PATCH NONLEGEND: HCPCS | Performed by: NURSE PRACTITIONER

## 2024-02-24 PROCEDURE — 80048 BASIC METABOLIC PNL TOTAL CA: CPT | Performed by: INTERNAL MEDICINE

## 2024-02-24 PROCEDURE — 94640 AIRWAY INHALATION TREATMENT: CPT

## 2024-02-24 PROCEDURE — 2500000001 HC RX 250 WO HCPCS SELF ADMINISTERED DRUGS (ALT 637 FOR MEDICARE OP): Performed by: NURSE PRACTITIONER

## 2024-02-24 PROCEDURE — 2500000004 HC RX 250 GENERAL PHARMACY W/ HCPCS (ALT 636 FOR OP/ED): Performed by: NURSE PRACTITIONER

## 2024-02-24 PROCEDURE — 2500000002 HC RX 250 W HCPCS SELF ADMINISTERED DRUGS (ALT 637 FOR MEDICARE OP, ALT 636 FOR OP/ED): Performed by: NURSE PRACTITIONER

## 2024-02-24 PROCEDURE — 2500000002 HC RX 250 W HCPCS SELF ADMINISTERED DRUGS (ALT 637 FOR MEDICARE OP, ALT 636 FOR OP/ED): Performed by: INTERNAL MEDICINE

## 2024-02-24 PROCEDURE — G0378 HOSPITAL OBSERVATION PER HR: HCPCS

## 2024-02-24 PROCEDURE — 85027 COMPLETE CBC AUTOMATED: CPT | Performed by: INTERNAL MEDICINE

## 2024-02-24 PROCEDURE — 36415 COLL VENOUS BLD VENIPUNCTURE: CPT | Performed by: INTERNAL MEDICINE

## 2024-02-24 PROCEDURE — 2500000001 HC RX 250 WO HCPCS SELF ADMINISTERED DRUGS (ALT 637 FOR MEDICARE OP): Performed by: INTERNAL MEDICINE

## 2024-02-24 RX ORDER — BISACODYL 5 MG
5 TABLET, DELAYED RELEASE (ENTERIC COATED) ORAL ONCE
Status: COMPLETED | OUTPATIENT
Start: 2024-02-24 | End: 2024-02-24

## 2024-02-24 RX ORDER — ADHESIVE BANDAGE
30 BANDAGE TOPICAL ONCE
Status: COMPLETED | OUTPATIENT
Start: 2024-02-24 | End: 2024-02-24

## 2024-02-24 RX ADMIN — ASPIRIN 81 MG 81 MG: 81 TABLET ORAL at 20:37

## 2024-02-24 RX ADMIN — Medication 2000 UNITS: at 09:25

## 2024-02-24 RX ADMIN — BISACODYL 5 MG: 5 TABLET, COATED ORAL at 13:45

## 2024-02-24 RX ADMIN — Medication 1 TABLET: at 09:21

## 2024-02-24 RX ADMIN — IPRATROPIUM BROMIDE AND ALBUTEROL SULFATE 3 ML: .5; 3 SOLUTION RESPIRATORY (INHALATION) at 18:24

## 2024-02-24 RX ADMIN — POLYETHYLENE GLYCOL 3350 17 G: 17 POWDER, FOR SOLUTION ORAL at 09:00

## 2024-02-24 RX ADMIN — SENNOSIDES 8.6 MG: 8.6 TABLET, FILM COATED ORAL at 20:37

## 2024-02-24 RX ADMIN — ATORVASTATIN CALCIUM 80 MG: 80 TABLET, FILM COATED ORAL at 20:37

## 2024-02-24 RX ADMIN — MAGNESIUM HYDROXIDE 30 ML: 400 SUSPENSION ORAL at 13:45

## 2024-02-24 RX ADMIN — PREGABALIN 50 MG: 50 CAPSULE ORAL at 09:20

## 2024-02-24 RX ADMIN — TRAMADOL HYDROCHLORIDE 25 MG: 50 TABLET ORAL at 11:45

## 2024-02-24 RX ADMIN — ENOXAPARIN SODIUM 40 MG: 40 INJECTION SUBCUTANEOUS at 09:34

## 2024-02-24 RX ADMIN — ESCITALOPRAM OXALATE 5 MG: 10 TABLET ORAL at 09:20

## 2024-02-24 RX ADMIN — IPRATROPIUM BROMIDE AND ALBUTEROL SULFATE 3 ML: .5; 3 SOLUTION RESPIRATORY (INHALATION) at 06:50

## 2024-02-24 RX ADMIN — IPRATROPIUM BROMIDE AND ALBUTEROL SULFATE 3 ML: .5; 3 SOLUTION RESPIRATORY (INHALATION) at 13:41

## 2024-02-24 RX ADMIN — NICOTINE 1 PATCH: 21 PATCH, EXTENDED RELEASE TRANSDERMAL at 09:30

## 2024-02-24 RX ADMIN — CLOPIDOGREL 75 MG: 75 TABLET ORAL at 09:24

## 2024-02-24 RX ADMIN — PREGABALIN 50 MG: 50 CAPSULE ORAL at 20:37

## 2024-02-24 RX ADMIN — DONEPEZIL HYDROCHLORIDE 10 MG: 10 TABLET ORAL at 20:37

## 2024-02-24 ASSESSMENT — PAIN SCALES - GENERAL
PAINLEVEL_OUTOF10: 0 - NO PAIN
PAINLEVEL_OUTOF10: 10 - WORST POSSIBLE PAIN
PAINLEVEL_OUTOF10: 0 - NO PAIN

## 2024-02-24 ASSESSMENT — COGNITIVE AND FUNCTIONAL STATUS - GENERAL
DRESSING REGULAR LOWER BODY CLOTHING: A LITTLE
WALKING IN HOSPITAL ROOM: A LITTLE
STANDING UP FROM CHAIR USING ARMS: A LITTLE
CLIMB 3 TO 5 STEPS WITH RAILING: A LOT
DAILY ACTIVITIY SCORE: 21
HELP NEEDED FOR BATHING: A LITTLE
MOBILITY SCORE: 20
DRESSING REGULAR UPPER BODY CLOTHING: A LITTLE

## 2024-02-24 ASSESSMENT — PAIN - FUNCTIONAL ASSESSMENT
PAIN_FUNCTIONAL_ASSESSMENT: 0-10
PAIN_FUNCTIONAL_ASSESSMENT: 0-10

## 2024-02-24 NOTE — PROGRESS NOTES
Oni Newsome is a 73 y.o. male on day 0 of admission presenting with Dizziness.      Subjective   Called by radiology overnight for critical findings of vertebral stenosis on MRIs.  Neurosurgery consulted.    Patient seen and examined at bedside.  Neurology present as well.  Discussed neurosurgery's recommendation for surgery.  Also discussed patient's pending PET scan.  He states that he would like to get the PET scan done that is scheduled outpatient this upcoming Wednesday.  He is interested in discussing potential spinal surgery in the future.  He initially stated that he plans to discharge home with home health care with the assistance of his son.  However, he then states his son will not be in town until next month.  Patient advised to reconsider going to SNF at discharge before going home.  Advised that he can still get his outpatient PET scan and outpatient neurosurgery consultation from SNF.  Ultimately, patient would be agreeable to this.  Patient does state that he is interested in seeing if this hilar node is cancerous but that he would not pursue cancer treatment.       Objective     Last Recorded Vitals  /51   Pulse 63   Temp 36.3 °C (97.3 °F)   Resp 18   Wt 63.5 kg (139 lb 15.9 oz)   SpO2 96%   Intake/Output last 3 Shifts:    Intake/Output Summary (Last 24 hours) at 2/24/2024 1820  Last data filed at 2/24/2024 1036  Gross per 24 hour   Intake 355 ml   Output --   Net 355 ml       Admission Weight  Weight: 63.5 kg (140 lb) (02/22/24 1234)    Daily Weight  02/23/24 : 63.5 kg (139 lb 15.9 oz)    Image Results  MR cervical spine wo IV contrast  Narrative: Interpreted By:  Deshaun Springer,   STUDY:  MR CERVICAL SPINE WO IV CONTRAST;  2/23/2024 7:45 pm      INDICATION:  Signs/Symptoms:Spinal stenosis.      COMPARISON:  None.      ACCESSION NUMBER(S):  [id number]      ORDERING CLINICIAN:  KEVAN BELLE      TECHNIQUE:  Multiplanar, multisequence images of the cervical spine were  obtained  without contrast.      FINDINGS:  PARTIALLY VISUALIZED SKULL BASE: No acute abnormality. Small amount  of secretions in the sphenoid sinuses.      PARASPINAL SOFT TISSUES: No significant abnormality.      SPINAL CORD: There is severe compression of the cervical spinal cord  at C3-C4 and C4-C5. There is mild compression of the cervical cord at  C5-C6 and C6-C7. There is increased signal within the central gray of  the cervical cord at C3-C5 with cord atrophy consistent with chronic  compressive ischemic myelomalacia.      BONE MARROW/VERTEBRAL BODIES: There is Modic type 1 degenerative  endplate signal changes at C5-C6, C6-C7, C7-T1. There is no evidence  of an acute fracture. The vertebral body heights are maintained.      ALIGNMENT: There is 0.6 cm of retrolisthesis of C2 on C3; 0.3 cm of  anterolisthesis of C3 on C4; 0.4 cm of anterolisthesis of C4 on C5;  0.4 cm of retrolisthesis of C6 on C7; 0.5 cm of anterolisthesis of C7  on T1.          SPINAL CANAL, INTERVERTEBRAL DISCS, AND NEURAL FORAMINA:      C1-C2: The atlantodental joint is intact. The predental space is not  widened.      C2-C3: Broad-based disc protrusion and ligamentum flavum thickening  result in mild cord compression and complete effacement of ventral  and dorsal CSF. There is osteophytic moderate-severe right foraminal  stenosis and moderate left foraminal stenosis.      C3-C4: Severe compression of the cervical cord due to diffuse disc  spur complex, ligamentum flavum thickening/buckling, and  anterolisthesis of C3 on C4. There is severe bilateral osteophytic  neural foraminal stenosis compressing the intraforaminal bilateral C4  nerve roots.      C4-C5: Severe compression of the cord due to diffuse disc spur  complex, ligamentum flavum thickening/buckling, and anterolisthesis  of C4 on C5. There is severe bilateral osteophytic neural foraminal  stenosis (right > left) with probable compression of the  intraforaminal C5 nerve roots.       C5-C6: Diffuse disc spur complex, ligamentum flavum thickening result  in mild cord compression. There is severe right osteophytic foraminal  stenosis compressing the intraforaminal right C6 nerve root; there is  moderate left osteophytic neural foraminal stenosis.      C6-C7: Severe disc height loss, diffuse disc spur complex and  retrolisthesis of C6 on C7 with ligamentum flavum thickening result  in the mild cord compression and complete effacement of the CSF  circumferentially. There is severe right osteophytic foraminal  stenosis with compression of the intraforaminal right C7 nerve root;  there is moderate-severe left osteophytic neural foraminal stenosis.      C7-T1: Severe disc height loss with anterolisthesis of C7 on T1,  ligamentum flavum thickening/buckling, and mild canal stenosis  without cord compression. There is moderate to severe right foraminal  stenosis. No significant left neural foraminal stenosis.      There are degenerative facet joint effusions throughout the cervical  spine, most pronounced at C3-C4, C4-C5.      Impression: 1. Severe spondylotic compression of the cervical spinal cord at both  C3-C4 and C4-C5, greatest at C3-C4, with compressive ischemic  myelomalacia involving the cervical cord from C3-C5. Mild spondylotic  cord compression at C2-C3, C5-C6 and C6-C7. As detailed above, this  is due to combination of degenerative disc disease, ligamentum flavum  thickening/buckling, and degenerative spondylolisthesis.      2. Severe osteophytic neural foraminal stenosis resulting in definite  or probable impingement of the intraforaminal nerve roots at all  subaxial levels.      MACRO:  Deshaun Springer discussed the significance and urgency of this  critical finding by telephone with Dr. Chandler Bean on 2/23/2024 at  8:30 p.m.. (**-RCF-**) Findings:  See findings.      Signed by: Deshaun Springer 2/23/2024 8:42 PM  Dictation workstation:   [id number]  MR lumbar spine wo IV  contrast  Narrative: Interpreted By:  Deshaun Springer,   STUDY:  MR LUMBAR SPINE WO IV CONTRAST;  2/23/2024 7:45 pm      INDICATION:  Signs/Symptoms:Spinal stenosis.      COMPARISON:  05/02/2022 MRI lumbar spine      ACCESSION NUMBER(S):  [id number]      ORDERING CLINICIAN:  KEVAN BELLE      TECHNIQUE:  Multiplanar, multisequence images of the lumbar spine were obtained  without contrast.      FINDINGS:  VISUALIZED ABDOMEN: Prostate gland is severely enlarged measuring 6.6  cm in transverse dimension. There is a 1.5 cm x 1.6 cm paralabral  cyst adjacent to the posterosuperior right acetabular labrum  consistent with chronic labral tear, new from 05/02/2022.      PARASPINAL SOFT TISSUES: There is mild nonspecific paraspinal muscle  edema on the right at L3-L4 and on the left at L4-L5.      BONE MARROW/VERTEBRAL BODIES: Overall bone marrow signal is within  normal limits. There is redemonstration of a slight compression  deformity of the right superior L3 endplate. The vertebral body  heights are otherwise maintained. No acute fracture.      ALIGNMENT: Coronal  images demonstrate levoconvex curvature  centered at L3-L4 due to asymmetric severe right-sided disc height  loss.      SPINAL CORD/CONUS: The conus medullaris terminates at T12-L1.      SPINAL CANAL, INTERVERTEBRAL DISCS, AND NEURAL FORAMINA:      There is severe multilevel degenerative disc disease and facet  arthropathy with diffuse disc height loss, vacuum disc phenomenon,  disc desiccation, and disc spur complexes which is overall progressed  from prior study. For example, there is slight enlargement of  multilevel facet joint effusions and appearance of multiple new  extra-spinal synovial cysts, enlargement of disc spur complexes, and  progression of canal and foraminal stenosis. Level by level details  are below.      T12-L1: Mild diffuse disc bulge, mild facet arthropathy, and mild  ligamentum flavum thickening. No significant canal  stenosis. Mild  bilateral neural foraminal stenosis.      L1-L2: Moderate disc height loss, desiccation, diffuse disc spur  complex, ligamentum flavum thickening, and facet arthropathy  contribute to mild circumferential canal stenosis. There abutment of  the descending bilateral L2 nerve roots by disc spur complex. There  is mild-moderate bilateral foraminal stenosis with abutment of the  proximal extraforaminal left L1 nerve root by the diffuse disc bulge  (axial T2WI 10/27, series 22).      L2-L3: Severe disc height loss, diffuse disc spur complex, facet  arthropathy and ligamentum flavum thickening. There is severe right  lateral recess stenosis compressing the descending right L3 nerve  root. There is abutment of descending left L3 nerve root. There is  mild mass effect on intraforaminal/proximal extraforaminal right L2  nerve root between hypertrophic facet and large  intraforaminal/extraforaminal disc spur complex. Mild left neural  foraminal stenosis. There is buckling of the cauda equina nerve roots  at L2-L3 likely due to the distal compression at L3-L4.      L3-L4: Slight progression of severe compression of the thecal sac and  numerous descending cauda equina nerve roots due to large diffuse  disc spur complex, moderate hypertrophic facet arthropathy and  ligamentum flavum thickening. There is most severe compression in the  left lateral recess. There is severe right foraminal stenosis  compressing the intraforaminal right L3 nerve root (sagittal T2WI  13/33). There is moderate-severe left foraminal stenosis with  compression of the intraforaminal left L3 nerve root (sagittal T2WI  24/33).      L4-L5: Severe hypertrophic facet arthropathy bilaterally, diffuse  disc spur complex. Severe lateral recess stenosis bilaterally  compressing descending bilateral L3 nerve roots and overall  moderate-severe central canal stenosis. There is severe bilateral  foraminal stenosis compressing the intraforaminal  bilateral L4 nerve  roots. There is a right intraforaminal annular fissure.      L5-S1: There is moderate-severe disc height loss, diffuse disc spur  complex with bilateral intraforaminal extension, and abutment of the  descending bilateral S1 nerve roots resulting in compression of the  left descending S1 nerve root (axial T2WI 15/22). There is severe  left foraminal stenosis compressing the intraforaminal left L5 nerve  root. There mild-moderate right foraminal stenosis.      Impression: Progression of multilevel lumbar spondylosis compared to 05/02/2022:      1. Severe thecal sac compression at L3-L4 compressing the majority of  descending cauda equina nerve roots, progressed. Multilevel severe  neural foraminal stenosis and high-grade lateral recess stenosis  result in additional sites of severe nerve root impingement  (descending right L3 nerve root at L2-L3, intraforaminal bilateral L3  nerve roots at L3-L4, bilateral intraforaminal L4 nerve root at  L4-L5, intraforaminal left L5 nerve root at L5-S1, descending left S1  nerve root at L5-S1).      2. Worsening facet arthropathy with increased joint effusions and  extra-spinal synovial cysts. New mild paraspinal edema may relate to  degenerative a shin edema or low-grade muscle strain.      3. New right acetabular paralabral cyst denoting a chronic tear of  the posterosuperior right labrum.      4. Enlarged prostate gland measuring 6.6 cm in transverse dimension.  Please correlate with PSA levels and urology follow-up recommended.      MACRO:  Deshaun Springer discussed the significance and urgency of this  critical finding by telephone with  Dr. Chandler Bean on 2/23/2024 at  8:30 pm.  (**-RCF-**) Findings:  See findings.      Signed by: Deshaun Springer 2/23/2024 8:41 PM  Dictation workstation:   [id number]  MR brain wo IV contrast, MR angio neck wo IV contrast, MR angio head wo IV contrast  Narrative: Interpreted By:  Deshaun Springer,   STUDY:  MR BRAIN WO  IV CONTRAST; MR ANGIO HEAD WO IV CONTRAST; MR ANGIO NECK  WO IV CONTRAST;  2/23/2024 7:45 pm      INDICATION:  Signs/Symptoms:dizziness, weakness; Signs/Symptoms:Dizziness,  weakness. Stroke protocol.      COMPARISON:  CT head w/o contrast dated 02/15/2024, CT head without contrast  07/22/2023      ACCESSION NUMBER(S):  [id number]; [id number]; [id number]      ORDERING CLINICIAN:  SUSANA URIAS      TECHNIQUE:  Multiplanar, multi-sequence images of the brain were obtained without  contrast.      3D time-of-flight MR angiography of the head and neck was performed.  The images were reviewed as source images and maximum intensity  projections.      FINDINGS:  MRI BRAIN:      Diffusion weighted images show no evidence of acute ischemic infarct.      Mild periventricular and subcortical hemispheric T2/FLAIR white  matter hyperintensities are most compatible with chronic small vessel  ischemic disease.      There is redemonstration of a small subdural fluid collection (T1  hypointense, T2 hyperintense) overlying the left frontal probable  convexity measuring no greater than 0.3 cm in maximal thickness  (unchanged vs CT head 02/15/2024). There is no acute intraparenchymal  hemorrhage, mass, mass-effect, or acute extra-axial hemorrhage.      The ventricular size and cerebral volume are prominent owing to  parenchymal volume loss.      Normal morphology of midline structures. The craniovertebral junction  is normal.      The orbits and globes are unremarkable.      The paranasal sinuses show no air-fluid levels or hemorrhage.      The mastoid air cells are clear.          MRA HEAD:      There is no hemodynamically significant intracranial stenosis or  large vessel occlusion. 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          MRA NECK:  Source images are motion degraded.  The origins of the  carotid and vertebral arteries are not included.  Mild spin-dephasing artifact is noted at the carotid bulbs.      COMMON/INTERNAL CAROTID ARTERIES:  No occlusion, hemodynamically  significant stenosis, or dissection.      VERTEBRAL ARTERIES:  No occlusion, hemodynamically significant  stenosis, or dissection.      Impression: MRI BRAIN:  1. No acute infarct, acute hemorrhage, or intracranial mass effect.  2. Unchanged small chronic left subdural fluid collection measuring  no greater than 0.3 cm in maximal thickness. Note, this collection  was significantly larger on 07/22/2023 (0.8 cm) therefore this  represents an incompletely resolved chronic subdural hematoma.      MRA HEAD AND NECK:  1. No evidence of major vessel occlusion or significant stenosis on  MRA head.  2. No evidence of major vessel occlusion or significant stenosis on  MRA neck.  3. 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      MACRO:  None.      Signed by: Deshaun Springer 2/23/2024 8:05 PM  Dictation workstation:   [id number]  Transthoracic Echo (TTE) [address] [id number] [phone] and                                  Fax [phone]    TRANSTHORACIC ECHOCARDIOGRAM REPORT       Patient Name:      PETE WESLEY      Reading Physician:    [id number] Raffaele Crenshaw MD  Study Date:        2/23/2024           Ordering Provider:    [id number] SUSANA URIAS  MRN/PID:           [id number]            Fellow:  Accession#:        [id number]        Nurse:  Date of Birth/Age: 1950 / 73      Sonographer:          Houston Garner ACS,                     years                                     RDCS, FASE  Gender:            M                   Additional Staff:  Height:            172.72 cm           Admit Date:           2/22/2024  Weight:            63.50 kg             Admission Status:     Observation -                                                               Priority discharge  BSA / BMI:         1.76 m2 / 21.29     Encounter#:           [id number]                     kg/m2                                         Department Location:  [facility]  Blood Pressure: 110 /59 mmHg    Study Type:    TRANSTHORACIC ECHO (TTE) COMPLETE  Diagnosis/ICD: Other chest pain-R07.89  Indication:    Chest Pain  CPT Code:      Echo Complete w Full Doppler-[id number]    Patient History:  Pertinent History: Chest Pain. Dizzy.    Study Detail: The following Echo studies were performed: 2D, M-Mode, Doppler and                color flow.       PHYSICIAN INTERPRETATION:  Left Ventricle: The left ventricular systolic function is normal, with an estimated ejection fraction of 65-70%. There are no regional wall motion abnormalities. The left ventricular cavity size is normal. Spectral Doppler shows an impaired relaxation pattern of left ventricular diastolic filling.  Left Atrium: The left atrium is mildly dilated.  Right Ventricle: The right ventricle is normal in size. There is normal right ventricular global systolic function.  Right Atrium: The right atrium is normal in size.  Aortic Valve: The aortic valve is probably trileaflet. There is evidence of mildly elevated transaortic gradients consistent with sclerosis of the aortic valve.  There is no evidence of aortic valve regurgitation. The peak instantaneous gradient of the aortic valve is 11.4 mmHg. The mean gradient of the aortic valve is 6.0 mmHg.  Mitral Valve: The mitral valve is normal in structure. There is no evidence of mitral valve regurgitation.  Tricuspid Valve: The tricuspid valve is structurally normal. No evidence of tricuspid regurgitation.  Pulmonic Valve: The pulmonic valve is not well visualized. There is no indication of pulmonic valve regurgitation.  Pericardium: There is no pericardial effusion noted.  Aorta: The aortic  root is normal.       CONCLUSIONS:   1. Left ventricular systolic function is normal with a 65-70% estimated ejection fraction.   2. Spectral Doppler shows an impaired relaxation pattern of left ventricular diastolic filling.   3. Aortic valve sclerosis.    QUANTITATIVE DATA SUMMARY:  2D MEASUREMENTS:                            Normal Ranges:  Ao Root d:     3.90 cm    (2.0-3.7cm)  LAs:           4.00 cm    (2.7-4.0cm)  IVSd:          1.29 cm    (0.6-1.1cm)  LVPWd:         0.86 cm    (0.6-1.1cm)  LVIDd:         4.88 cm    (3.9-5.9cm)  LVIDs:         2.33 cm  LV Mass Index: 109.9 g/m2  LV % FS        52.3 %    LA VOLUME:                                Normal Ranges:  LA Vol A4C:        40.3 ml    (22+/-6mL/m2)  LA Vol A2C:        47.2 ml  LA Vol BP:         44.5 ml  LA Vol Index A4C:  22.9ml/m2  LA Vol Index A2C:  26.9 ml/m2  LA Vol Index BP:   25.3 ml/m2  LA Area A4C:       16.0 cm2  LA Area A2C:       17.0 cm2  LA Major Axis A4C: 5.4 cm  LA Major Axis A2C: 5.2 cm  LA Volume Index:   23.0 ml/m2    RA VOLUME BY A/L METHOD:                        Normal Ranges:  RA Area A4C: 15.0 cm2    M-MODE MEASUREMENTS:                   Normal Ranges:  Ao Root: 3.60 cm (2.0-3.7cm)  LAs:     4.50 cm (2.7-4.0cm)    AORTA MEASUREMENTS:                     Normal Ranges:  Asc Ao, d: 3.70 cm (2.1-3.4cm)    LV SYSTOLIC FUNCTION BY 2D PLANIMETRY (MOD):                      Normal Ranges:  EF-A4C View: 76.8 % (>=55%)  EF-A2C View: 74.0 %  EF-Biplane:  75.4 %    LV DIASTOLIC FUNCTION:                               Normal Ranges:  MV Peak E:        0.83 m/s   (0.7-1.2 m/s)  MV Peak A:        0.48 m/s   (0.42-0.7 m/s)  E/A Ratio:        1.73       (1.0-2.2)  MV lateral e'     0.12 m/s  MV medial e'      0.09 m/s  PulmV Sys Yoni:    78.40 cm/s  PulmV Rawls Yoni:   57.30 cm/s  PulmV S/D Yoni:    1.40  PulmV A Revs Yoni: 29.10 cm/s    MITRAL VALVE:                  Normal Ranges:  MV DT: 215 msec (150-240msec)    AORTIC VALVE:                                      Normal Ranges:  AoV Vmax:                1.69 m/s  (<=1.7m/s)  AoV Peak P.4 mmHg (<20mmHg)  AoV Mean P.0 mmHg  (1.7-11.5mmHg)  LVOT Max Yoni:            1.35 m/s  (<=1.1m/s)  AoV VTI:                 40.20 cm  (18-25cm)  LVOT VTI:                33.30 cm  LVOT Diameter:           1.80 cm   (1.8-2.4cm)  AoV Area, VTI:           2.11 cm2  (2.5-5.5cm2)  AoV Area,Vmax:           2.03 cm2  (2.5-4.5cm2)  AoV Dimensionless Index: 0.83       RIGHT VENTRICLE:  RV Basal 3.56 cm  RV Mid   2.52 cm  TAPSE:   28.3 mm  RV s'    0.19 m/s    TRICUSPID VALVE/RVSP:                              Normal Ranges:  Peak TR Velocity: 2.40 m/s  RV Syst Pressure: 26.0 mmHg (< 30mmHg)  IVC Diam:         2.20 cm    PULMONIC VALVE:                       Normal Ranges:  PV Max Yoni: 1.0 m/s  (0.6-0.9m/s)  PV Max P.3 mmHg    Pulmonary Veins:  PulmV A Revs Yoni: 29.10 cm/s  PulmV Rawls Yoni:   57.30 cm/s  PulmV S/D Yoni:    1.40  PulmV Sys Yoni:    78.40 cm/s       [id number] Raffaele Crenshaw MD  Electronically signed on 2024 at 9:35:24 AM       ** Final **      Physical Exam  Constitutional:       General: He is awake. He is not in acute distress.     Appearance: Normal appearance. He is not toxic-appearing.   HENT:      Head: Atraumatic.      Nose: Nose normal.      Mouth/Throat:      Mouth: Mucous membranes are moist.   Eyes:      Extraocular Movements: Extraocular movements intact.      Conjunctiva/sclera: Conjunctivae normal.      Pupils: Pupils are equal, round, and reactive to light.   Cardiovascular:      Rate and Rhythm: Normal rate and regular rhythm.      Pulses: Normal pulses.      Heart sounds: No murmur heard.  Pulmonary:      Effort: Pulmonary effort is normal.      Breath sounds: Wheezing and rhonchi present.   Abdominal:      General: Bowel sounds are normal. There is no distension.      Palpations: Abdomen is soft.      Tenderness: There is no abdominal tenderness. There is no guarding.    Musculoskeletal:         General: No swelling, deformity or signs of injury. Normal range of motion.      Cervical back: Neck supple.      Right lower leg: No edema.      Left lower leg: No edema.   Skin:     General: Skin is warm and dry.      Capillary Refill: Capillary refill takes less than 2 seconds.      Findings: No ecchymosis, erythema or wound.   Neurological:      General: No focal deficit present.      Mental Status: He is alert and oriented to person, place, and time.   Psychiatric:         Mood and Affect: Affect is flat.         Speech: Speech is delayed.         Behavior: Behavior is cooperative.         Thought Content: Thought content normal.     Relevant Results               Assessment/Plan        Principal Problem:    Dizziness    73-year-old male with past medical history of Alzheimer's dementia, hypertension, hyperlipidemia, COPD, nicotine use disorder, AAA, PAD s/p angioplasty LLE and left great toe amputation, BPH, macular degeneration, and cataracts.  Patient presents to the hospital with multiple complaints most notably for dizziness, chest pain, generalized weakness/fatigue, and difficulty with ADLs at home.     #Dizziness  #Tremor, left upper extremity  #Weakness, generalized     Telemetry monitoring  Neurology consult for input  MRI of the brain, MRA of the head and neck  Echocardiogram  Orthostatic vitals  PT/OT with [facility]  Social work for discharge planning, patient concerned he can no longer care for himself     #Chest pain     Cardiology consult, appreciate input  Reports left chest discomfort, sounds pleuritic in nature  Initial ischemic workup in the ED negative.  Trend troponins.  Echocardiogram  Lipid panel in a.m.  Aspirin 81 mg daily     #COPD  #Nicotine use disorder     Patient is wheezing/coughing on exam  Nebulizers as needed and scheduled  If not improved may benefit from course of steroids  Currently on room air, supplemental oxygen as needed  RT consult  Nicotine patch  and gum while hospitalized  Mucinex twice daily     #Constipation     Last bowel movement 3 days ago, he does report black stools, however low suspicion for GI bleed  MiraLAX daily  Monitor     #Enlarged left hilar node  #Lung nodules     OP follow up, already referred to lung nodule clinic     Chronic conditions:  #PAD s/p left lower extremity angioplasty  #History AAA  #Hypertension  # Hyperlipidemia  #BPH  #Macular degeneration  #Cataracts     Continue with patient's home medications once entered by nursing/pharmacy     #DVT prophylaxis  SCDs  Lovenox subcutaneous     2/24: MRIs show critical stenosis with nerve root and cord compression in the cervical and lumbar spine.  Neurosurgery recommending decompressive laminectomy with fusion.  Patient cleared from a cardiac perspective.  He does have a pending outpatient PET scan for a hilar node on CT chest.  Tentative plan is to discharge to SNF at the beginning of next week to maintain outpatient PET scan appointment and have close follow-up with neurosurgery to discuss potential spinal surgery.      Malnutrition Diagnosis Status: New  Malnutrition Diagnosis: Moderate malnutrition related to chronic disease or condition  As Evidenced by: moderate muscle and fat loss noted on physical exam.  < 75% energy intake compared to estimated needs for > 1 month.  Pt is 84% of his IBW,  has dementia and is living by himself up until now  I agree with the dietitian's malnutrition diagnosis.         Michael Bean, DO

## 2024-02-24 NOTE — CONSULTS
Reason For Consult  Severe cervical spondylosis with myelopathy and myelomalacia    History Of Present Illness  Oni Newsome is a 73 y.o. male presenting with history of progressive difficulty walking secondary to weakness in his legs and weakness in his arms that prevent him from getting his walker out of the car.  He states that for the past 2 years he has had severe back pain with an inability to stand for more than 5 minutes.  He has some shoulder discomfort but it is not what he is complaining of.  He has noticed weakness in his arms.  His primary complaint however is his weakness and inability to walk and has terrible balance.  He has been falling at home.  He is reluctant to take my word on the fact that I think a big part of his problem is coming from his neck.  He is going to need some coaxing and education.  He does have lumbar canal stenosis that takes a backseat to his cervical spondylosis with cord compression and myelopathy and myelomalacia.  He is also telling me that he is short of breath and he is a 50+ pack year smoker.  He is a [place] vet but he is not sure if he was exposed to agent orange.         Because of his long list of comorbidities it may be impossible for the patient to safely undergo a C2-T2 fusion.  Regardless that procedure would not be performed here at [facility].      Past Medical History  He has a past medical history of Abdominal pain (02/19/2024), Abnormal weight loss (02/19/2024), Activity, unspecified (02/19/2024), Acute encephalopathy (02/19/2024), Age-related nuclear cataract, bilateral (02/19/2024), Age-related physical debility (02/19/2024), Atherosclerosis of artery of left lower extremity (CMS/HCC) (02/19/2024), Atherosclerosis of native arteries of extremities with rest pain, left leg (CMS/HCC) (02/19/2024), Benign prostatic hyperplasia (05/02/2002), Carbuncle (02/19/2024), Cellulitis (02/18/2023), Cervical radiculopathy (02/19/2024), Chronic obstructive  pulmonary disease (CMS/[facility]) (02/19/2024), Chronic pain (02/19/2024), Constipation (02/19/2024), Degeneration of cervical intervertebral disc (02/19/2024), Delusional disorder (CMS/[facility]) (02/19/2024), Dementia (CMS/[facility]) (02/19/2024), 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 (CMS/[facility]) (02/19/2024), Dementia with behavioral disturbance (CMS/[facility]) (02/19/2024), Depressive disorder (05/02/2002), Financial insecurity (02/19/2024), Gangrene (CMS/[facility]) (02/19/2024), Gastritis due to Helicobacter species (02/19/2024), Hematemesis (02/19/2024), High cholesterol, Hyperammonemia (CMS/[facility]) (02/19/2024), Hyperlipidemia (02/19/2024), Hyperlipidemia (02/19/2024), Hypertension, Hypokalemia (02/19/2024), Hyponatremia (02/19/2024), Impotence of organic origin (02/19/2024), Low back pain (02/19/2024), Low back pain, unspecified (02/19/2024), Lumbar radiculopathy (02/19/2024), Lumbar spondylosis (02/19/2024), Lumbar stenosis with neurogenic claudication (02/19/2024), Macular degeneration (02/19/2024), Mild cognitive impairment of uncertain or unknown etiology (02/19/2024), Mixed hyperlipidemia (05/02/2002), Morbid obesity (CMS/[facility]) (05/02/2002), Multiple nodules of lung (02/19/2024), Neurosyphilis (02/19/2024), Nicotine dependence (02/19/2024), Nicotine dependence, unspecified, uncomplicated (02/19/2024), Noncompliance with treatment (02/19/2024), Obesity (02/19/2024), Obesity with body mass index 30 or greater (02/19/2024), PAD (peripheral artery disease) (CMS/HCC) (02/24/2023), Polyp of colon (02/19/2024), Primary hypertension (02/19/2024), Prolonged Q-T interval on ECG (02/19/2024), Proteinuria (02/19/2024), Psychosis (CMS/HCC) (02/19/2024), Psychosis, paranoid, involutional (CMS/HCC) (02/19/2024), Radiculopathy, lumbar region (02/19/2024), Rash and other nonspecific skin eruption (02/19/2024), Subacute subdural hematoma (CMS/HCC) (02/19/2024), Subdural hemorrhage (CMS/[facility]) (02/19/2024), Supraventricular tachycardia (02/19/2024),  "Tobacco use disorder (05/02/2002), Unspecified dementia, moderate, with anxiety (CMS/[facility]) (02/19/2024), Unspecified dementia, unspecified severity, with agitation (CMS/[facility]) (02/19/2024), and Urinary obstruction (02/19/2024).    Surgical History  He has a past surgical history that includes Toe amputation.     Social History  He reports that he has been smoking cigarettes. He has been smoking an average of 1 pack per day. He has never used smokeless tobacco. He reports that he does not currently use alcohol. He reports that he does not use drugs.    Family History  Family History   Problem Relation Name Age of Onset    Heart disease Mother          Allergies  Gabapentin    Review of Systems  ROS: 14/14 systems negative other than listed in HPI     Physical Exam  General: NAD, AOx 3,  no aphasia or dysarthria, normal fund of knowledge  Cranial Nerves II-XII: VFF, PERRL, EOMI, Face Symm, Facial SILT, Palate/Tongue midline and symmetric  Motor: 4+/5 Throughout all extremities except he has weakness in both triceps and significant weakness in his bicep on the left, his  on both sides is also weak,  No drift, no dysmetria on finger to nose  Sensation: SILT and PP throughout all extremities  DTRS: 3+ Throughout in the uppers, positive left Hoffmans but no clonus     Last Recorded Vitals  Blood pressure 132/60, pulse 52, temperature 36.1 °C (97 °F), resp. rate 18, height 1.727 m (5' 7.99\"), weight 63.5 kg (139 lb 15.9 oz), SpO2 96 %.    Relevant Results  The MRI of the cervical spine shows severe spinal cord compression with signal change in the spinal cord. The compression is severe at C2-3, C3-4, and C4-5 as well as C7-T1 and C6-C7 there is anterior subluxation of C3-4 and C4-5 as well as C7-T1 signal change is isolated to the C3 to C4-5 level.  Lumbar MRI shows severe canal stenosis at L3-4 with severe right foraminal stenosis at that level severe stenosis at L4-5 and lateral recess stenosis on the left at L5-S1.  " This is all in combination with significant degenerative scoliosis.     Assessment/Plan     Mr. Newsome is a 73-year-old gentleman who is having numerous medical complications.  I do think that a large percentage of his problems related to the spine but he also has significant underlying problems that need to be addressed as well.  Is the dementia progressing?  Is there concern for pulmonary lesions?  Regardless he has significant cervical cord compression with myelomalacia and if he were medically capable to undergo an operation he would need a C2-T2 instrumented fusion and decompression.  The patient would prefer to take care of his lumbar spine but that does have to take a backseat to his cervical problems.  I have already discussed the situation with Dr. Duvall in case the patient is able to get medical clearance in which case she would be willing to perform the surgery as stated above.      I spent 45 minutes in the professional and overall care of this patient.      Delmar Tang MD

## 2024-02-24 NOTE — PROGRESS NOTES
"Oni Newsome is a 73 y.o. male on day 0 of admission presenting with Dizziness.      Subjective   The patient is a 73-year-old male with history of dementia, hypertension, hyperlipidemia and multiple other medical problems who was admitted with feeling weak, eating poorly and having difficulties with walking.  The patient is also complaining of headaches, blurred vision and nausea and these issues have been going on for about 6 months.  The patient has had multiple falls.  The patient is using a walker to ambulate.  The patient states that he is stable today and denies any new neurological problems.     I did review the neurosurgery and psychiatry consults.    Objective     Last Recorded Vitals  Blood pressure 110/53, pulse 55, temperature 36.4 °C (97.5 °F), temperature source Temporal, resp. rate 18, height 1.727 m (5' 7.99\"), weight 63.5 kg (139 lb 15.9 oz), SpO2 98 %.    Physical Exam  Neurological Exam  The patient's mental status testing is alert and oriented ×3 with no evidence of aphasia or dysarthria.    The patient's cranial nerves 2, 3, 4, 5, 6 and 7 are all within normal limits.  The patient's motor testing shows normal tone and bulk with at least 5-/5 power in the upper and lower extremities bilaterally.  The patient does have giveaway weakness of all of his extremities with strength testing.    Relevant Results        Sydnie Coma Scale  Best Eye Response: Spontaneous  Best Verbal Response: Oriented  Best Motor Response: Follows commands  [place] Coma Scale Score: 15          Scheduled medications  aspirin, 81 mg, oral, Nightly  atorvastatin, 80 mg, oral, Nightly  bisacodyl, 5 mg, oral, Once  cholecalciferol, 2,000 Units, oral, Daily  clopidogrel, 75 mg, oral, Daily  donepezil, 10 mg, oral, Nightly  enoxaparin, 40 mg, subcutaneous, q24h  escitalopram, 5 mg, oral, Daily  ipratropium-albuteroL, 3 mL, nebulization, TID  lisinopril, 10 mg, oral, Daily  magnesium hydroxide, 30 mL, oral, Once  multivitamin " with minerals, 1 tablet, oral, Daily  nicotine, 1 patch, transdermal, Daily   Followed by  [START ON 4/5/2024] nicotine, 1 patch, transdermal, Daily   Followed by  [START ON 4/19/2024] nicotine, 1 patch, transdermal, Daily  perflutren lipid microspheres, 0.5-10 mL of dilution, intravenous, Once in imaging  polyethylene glycol, 17 g, oral, Daily  pregabalin, 50 mg, oral, BID  sennosides, 1 tablet, oral, Nightly      Continuous medications     PRN medications  PRN medications: acetaminophen **OR** [DISCONTINUED] acetaminophen **OR** acetaminophen, albuterol, albuterol, guaiFENesin, nicotine polacrilex, oxygen, traMADol    Results for orders placed or performed during the hospital encounter of 02/22/24 (from the past 96 hour(s))   ECG 12 lead   Result Value Ref Range    Ventricular Rate 59 BPM    Atrial Rate 59 BPM    [place] Interval 162 ms    QRS Duration 96 ms    QT Interval 464 ms    QTC Calculation(Bazett) 459 ms    P Axis 51 degrees    R Axis 27 degrees    T Axis 48 degrees    QRS Count 10 beats    Q Onset 217 ms    P Onset 136 ms    P Offset 190 ms    T Offset 449 ms    QTC Fredericia 461 ms   Sars-CoV-2 PCR   Result Value Ref Range    Coronavirus 2019, PCR Not Detected Not Detected   Influenza A, and B PCR   Result Value Ref Range    Flu A Result Not Detected Not Detected    Flu B Result Not Detected Not Detected   RSV PCR   Result Value Ref Range    RSV PCR Not Detected Not Detected   CBC and Auto Differential   Result Value Ref Range    WBC 6.8 4.4 - 11.3 x10*3/uL    nRBC 0.0 0.0 - 0.0 /100 WBCs    RBC 3.87 (L) 4.50 - 5.90 x10*6/uL    Hemoglobin 12.4 (L) 13.5 - 17.5 g/dL    Hematocrit 36.4 (L) 41.0 - 52.0 %    MCV 94 80 - 100 fL    MCH 32.0 26.0 - 34.0 pg    MCHC 34.1 32.0 - 36.0 g/dL    RDW 15.0 (H) 11.5 - 14.5 %    Platelets 258 150 - 450 x10*3/uL    Neutrophils % 66.9 40.0 - 80.0 %    Immature Granulocytes %, Automated 0.1 0.0 - 0.9 %    Lymphocytes % 22.3 13.0 - 44.0 %    Monocytes % 8.9 2.0 - 10.0 %     Eosinophils % 1.2 0.0 - 6.0 %    Basophils % 0.6 0.0 - 2.0 %    Neutrophils Absolute 4.53 1.60 - 5.50 x10*3/uL    Immature Granulocytes Absolute, Automated 0.01 0.00 - 0.50 x10*3/uL    Lymphocytes Absolute 1.51 0.80 - 3.00 x10*3/uL    Monocytes Absolute 0.60 0.05 - 0.80 x10*3/uL    Eosinophils Absolute 0.08 0.00 - 0.40 x10*3/uL    Basophils Absolute 0.04 0.00 - 0.10 x10*3/uL   Magnesium   Result Value Ref Range    Magnesium 2.07 1.60 - 2.40 mg/dL   Comprehensive metabolic panel   Result Value Ref Range    Glucose 86 74 - 99 mg/dL    Sodium 137 136 - 145 mmol/L    Potassium 3.7 3.5 - 5.3 mmol/L    Chloride 105 98 - 107 mmol/L    Bicarbonate 27 21 - 32 mmol/L    Anion Gap 9 (L) 10 - 20 mmol/L    Urea Nitrogen 15 6 - 23 mg/dL    Creatinine 0.70 0.50 - 1.30 mg/dL    eGFR >90 >60 mL/min/1.73m*2    Calcium 9.3 8.6 - 10.3 mg/dL    Albumin 3.8 3.4 - 5.0 g/dL    Alkaline Phosphatase 53 33 - 136 U/L    Total Protein 6.1 (L) 6.4 - 8.2 g/dL    AST 14 9 - 39 U/L    Bilirubin, Total 0.5 0.0 - 1.2 mg/dL    ALT 10 10 - 52 U/L   Troponin I, High Sensitivity   Result Value Ref Range    Troponin I, High Sensitivity 7 0 - 20 ng/L   B-Type Natriuretic Peptide   Result Value Ref Range    BNP 32 0 - 99 pg/mL   Lactate   Result Value Ref Range    Lactate 0.7 0.4 - 2.0 mmol/L   Acute Toxicology Panel, Blood   Result Value Ref Range    Acetaminophen <10.0 10.0 - 30.0 ug/mL    Salicylate  <3 4 - 20 mg/dL    Alcohol <10 <=10 mg/dL   Folate   Result Value Ref Range    Folate, Serum >24.0 >5.0 ng/mL   Drug Screen, Urine   Result Value Ref Range    Amphetamine Screen, Urine Presumptive Negative Presumptive Negative    Barbiturate Screen, Urine Presumptive Negative Presumptive Negative    Benzodiazepines Screen, Urine Presumptive Negative Presumptive Negative    Cannabinoid Screen, Urine Presumptive Negative Presumptive Negative    Cocaine Metabolite Screen, Urine Presumptive Negative Presumptive Negative    Fentanyl Screen, Urine Presumptive  Negative Presumptive Negative    Opiate Screen, Urine Presumptive Negative Presumptive Negative    Oxycodone Screen, Urine Presumptive Negative Presumptive Negative    PCP Screen, Urine Presumptive Negative Presumptive Negative   Urinalysis with Reflex Culture and Microscopic   Result Value Ref Range    Color, Urine Klaudia (N) Straw, Yellow    Appearance, Urine Hazy (N) Clear    Specific Gravity, Urine 1.026 1.005 - 1.035    pH, Urine 5.0 5.0, 5.5, 6.0, 6.5, 7.0, 7.5, 8.0    Protein, Urine 30 (1+) (N) NEGATIVE mg/dL    Glucose, Urine NEGATIVE NEGATIVE mg/dL    Blood, Urine NEGATIVE NEGATIVE    Ketones, Urine NEGATIVE NEGATIVE mg/dL    Bilirubin, Urine NEGATIVE NEGATIVE    Urobilinogen, Urine 2.0 (N) <2.0 mg/dL    Nitrite, Urine NEGATIVE NEGATIVE    Leukocyte Esterase, Urine NEGATIVE NEGATIVE   Extra Urine Gray Tube   Result Value Ref Range    Extra Tube Hold for add-ons.    Urinalysis Microscopic   Result Value Ref Range    WBC, Urine NONE 1-5, NONE /HPF    RBC, Urine NONE NONE, 1-2, 3-5 /HPF    Mucus, Urine 2+ Reference range not established. /LPF    Calcium Oxalate Crystals, Urine 2+ (A) NONE, 1+ /HPF   CBC   Result Value Ref Range    WBC 7.9 4.4 - 11.3 x10*3/uL    nRBC 0.0 0.0 - 0.0 /100 WBCs    RBC 3.66 (L) 4.50 - 5.90 x10*6/uL    Hemoglobin 11.7 (L) 13.5 - 17.5 g/dL    Hematocrit 35.3 (L) 41.0 - 52.0 %    MCV 96 80 - 100 fL    MCH 32.0 26.0 - 34.0 pg    MCHC 33.1 32.0 - 36.0 g/dL    RDW 15.1 (H) 11.5 - 14.5 %    Platelets 246 150 - 450 x10*3/uL   Basic Metabolic Panel   Result Value Ref Range    Glucose 80 74 - 99 mg/dL    Sodium 139 136 - 145 mmol/L    Potassium 4.2 3.5 - 5.3 mmol/L    Chloride 108 (H) 98 - 107 mmol/L    Bicarbonate 26 21 - 32 mmol/L    Anion Gap 9 (L) 10 - 20 mmol/L    Urea Nitrogen 19 6 - 23 mg/dL    Creatinine 0.68 0.50 - 1.30 mg/dL    eGFR >90 >60 mL/min/1.73m*2    Calcium 9.0 8.6 - 10.3 mg/dL   Lipid Panel   Result Value Ref Range    Cholesterol 100 0 - 199 mg/dL    HDL-Cholesterol 37.4  mg/dL    Cholesterol/HDL Ratio 2.7     LDL Calculated 50 <=99 mg/dL    VLDL 13 0 - 40 mg/dL    Triglycerides 64 0 - 149 mg/dL    Non HDL Cholesterol 63 0 - 149 mg/dL   Vitamin B12   Result Value Ref Range    Vitamin B12 276 211 - 911 pg/mL   TSH   Result Value Ref Range    Thyroid Stimulating Hormone 2.63 0.44 - 3.98 mIU/L   Transthoracic Echo (TTE) Complete   Result Value Ref Range    AV pk roselyn 1.69 m/s    AV mn grad 6.0 mmHg    LVOT diam 1.80 cm    LV biplane EF 75 %    MV E/A ratio 1.73     LA vol index A/L 25.3 ml/m2    Tricuspid annular plane systolic excursion 2.8 cm    RV free wall pk S' 18.60 cm/s    LVIDd 4.88 cm    RVSP 26.0 mmHg    Aortic Valve Area by Continuity of VTI 2.11 cm2    Aortic Valve Area by Continuity of Peak Velocity 2.03 cm2    AV pk grad 11.4 mmHg    LV A4C EF 76.8    CBC   Result Value Ref Range    WBC 6.9 4.4 - 11.3 x10*3/uL    nRBC 0.0 0.0 - 0.0 /100 WBCs    RBC 3.58 (L) 4.50 - 5.90 x10*6/uL    Hemoglobin 11.3 (L) 13.5 - 17.5 g/dL    Hematocrit 34.3 (L) 41.0 - 52.0 %    MCV 96 80 - 100 fL    MCH 31.6 26.0 - 34.0 pg    MCHC 32.9 32.0 - 36.0 g/dL    RDW 15.0 (H) 11.5 - 14.5 %    Platelets 221 150 - 450 x10*3/uL   Basic Metabolic Panel   Result Value Ref Range    Glucose 78 74 - 99 mg/dL    Sodium 135 (L) 136 - 145 mmol/L    Potassium 3.8 3.5 - 5.3 mmol/L    Chloride 105 98 - 107 mmol/L    Bicarbonate 25 21 - 32 mmol/L    Anion Gap 9 (L) 10 - 20 mmol/L    Urea Nitrogen 16 6 - 23 mg/dL    Creatinine 0.60 0.50 - 1.30 mg/dL    eGFR >90 >60 mL/min/1.73m*2    Calcium 8.6 8.6 - 10.3 mg/dL         I did review the images of the MRI of the brain, MRA of the brain, MRA of the neck, MRI of the cervical spine and MRI of the lumbar spine.      MR cervical spine wo IV contrast    Result Date: 2/23/2024  Interpreted By:  Deshaun Springer, STUDY: MR CERVICAL SPINE WO IV CONTRAST;  2/23/2024 7:45 pm   INDICATION: Signs/Symptoms:Spinal stenosis.   COMPARISON: None.   ACCESSION NUMBER(S): [id number]    ORDERING CLINICIAN: KEVAN BELLE   TECHNIQUE: Multiplanar, multisequence images of the cervical spine were obtained without contrast.   FINDINGS: PARTIALLY VISUALIZED SKULL BASE: No acute abnormality. Small amount of secretions in the sphenoid sinuses.   PARASPINAL SOFT TISSUES: No significant abnormality.   SPINAL CORD: There is severe compression of the cervical spinal cord at C3-C4 and C4-C5. There is mild compression of the cervical cord at C5-C6 and C6-C7. There is increased signal within the central gray of the cervical cord at C3-C5 with cord atrophy consistent with chronic compressive ischemic myelomalacia.   BONE MARROW/VERTEBRAL BODIES: There is Modic type 1 degenerative endplate signal changes at C5-C6, C6-C7, C7-T1. There is no evidence of an acute fracture. The vertebral body heights are maintained.   ALIGNMENT: There is 0.6 cm of retrolisthesis of C2 on C3; 0.3 cm of anterolisthesis of C3 on C4; 0.4 cm of anterolisthesis of C4 on C5; 0.4 cm of retrolisthesis of C6 on C7; 0.5 cm of anterolisthesis of C7 on T1.     SPINAL CANAL, INTERVERTEBRAL DISCS, AND NEURAL FORAMINA:   C1-C2: The atlantodental joint is intact. The predental space is not widened.   C2-C3: Broad-based disc protrusion and ligamentum flavum thickening result in mild cord compression and complete effacement of ventral and dorsal CSF. There is osteophytic moderate-severe right foraminal stenosis and moderate left foraminal stenosis.   C3-C4: Severe compression of the cervical cord due to diffuse disc spur complex, ligamentum flavum thickening/buckling, and anterolisthesis of C3 on C4. There is severe bilateral osteophytic neural foraminal stenosis compressing the intraforaminal bilateral C4 nerve roots.   C4-C5: Severe compression of the cord due to diffuse disc spur complex, ligamentum flavum thickening/buckling, and anterolisthesis of C4 on C5. There is severe bilateral osteophytic neural foraminal stenosis (right > left) with probable  compression of the intraforaminal C5 nerve roots.   C5-C6: Diffuse disc spur complex, ligamentum flavum thickening result in mild cord compression. There is severe right osteophytic foraminal stenosis compressing the intraforaminal right C6 nerve root; there is moderate left osteophytic neural foraminal stenosis.   C6-C7: Severe disc height loss, diffuse disc spur complex and retrolisthesis of C6 on C7 with ligamentum flavum thickening result in the mild cord compression and complete effacement of the CSF circumferentially. There is severe right osteophytic foraminal stenosis with compression of the intraforaminal right C7 nerve root; there is moderate-severe left osteophytic neural foraminal stenosis.   C7-T1: Severe disc height loss with anterolisthesis of C7 on T1, ligamentum flavum thickening/buckling, and mild canal stenosis without cord compression. There is moderate to severe right foraminal stenosis. No significant left neural foraminal stenosis.   There are degenerative facet joint effusions throughout the cervical spine, most pronounced at C3-C4, C4-C5.       1. Severe spondylotic compression of the cervical spinal cord at both C3-C4 and C4-C5, greatest at C3-C4, with compressive ischemic myelomalacia involving the cervical cord from C3-C5. Mild spondylotic cord compression at C2-C3, C5-C6 and C6-C7. As detailed above, this is due to combination of degenerative disc disease, ligamentum flavum thickening/buckling, and degenerative spondylolisthesis.   2. Severe osteophytic neural foraminal stenosis resulting in definite or probable impingement of the intraforaminal nerve roots at all subaxial levels.   MACRO: Deshaun Springer discussed the significance and urgency of this critical finding by telephone with Dr. Chandler Bean on 2/23/2024 at 8:30 p.m.. (**-RCF-**) Findings:  See findings.   Signed by: Deshaun Springer 2/23/2024 8:42 PM Dictation workstation:   [id number]    MR lumbar spine wo IV  contrast    Result Date: 2/23/2024  Interpreted By:  Deshaun Springer, STUDY: MR LUMBAR SPINE WO IV CONTRAST;  2/23/2024 7:45 pm   INDICATION: Signs/Symptoms:Spinal stenosis.   COMPARISON: 05/02/2022 MRI lumbar spine   ACCESSION NUMBER(S): [id number]   ORDERING CLINICIAN: KEVAN BELLE   TECHNIQUE: Multiplanar, multisequence images of the lumbar spine were obtained without contrast.   FINDINGS: VISUALIZED ABDOMEN: Prostate gland is severely enlarged measuring 6.6 cm in transverse dimension. There is a 1.5 cm x 1.6 cm paralabral cyst adjacent to the posterosuperior right acetabular labrum consistent with chronic labral tear, new from 05/02/2022.   PARASPINAL SOFT TISSUES: There is mild nonspecific paraspinal muscle edema on the right at L3-L4 and on the left at L4-L5.   BONE MARROW/VERTEBRAL BODIES: Overall bone marrow signal is within normal limits. There is redemonstration of a slight compression deformity of the right superior L3 endplate. The vertebral body heights are otherwise maintained. No acute fracture.   ALIGNMENT: Coronal  images demonstrate levoconvex curvature centered at L3-L4 due to asymmetric severe right-sided disc height loss.   SPINAL CORD/CONUS: The conus medullaris terminates at T12-L1.   SPINAL CANAL, INTERVERTEBRAL DISCS, AND NEURAL FORAMINA:   There is severe multilevel degenerative disc disease and facet arthropathy with diffuse disc height loss, vacuum disc phenomenon, disc desiccation, and disc spur complexes which is overall progressed from prior study. For example, there is slight enlargement of multilevel facet joint effusions and appearance of multiple new extra-spinal synovial cysts, enlargement of disc spur complexes, and progression of canal and foraminal stenosis. Level by level details are below.   T12-L1: Mild diffuse disc bulge, mild facet arthropathy, and mild ligamentum flavum thickening. No significant canal stenosis. Mild bilateral neural foraminal stenosis.    L1-L2: Moderate disc height loss, desiccation, diffuse disc spur complex, ligamentum flavum thickening, and facet arthropathy contribute to mild circumferential canal stenosis. There abutment of the descending bilateral L2 nerve roots by disc spur complex. There is mild-moderate bilateral foraminal stenosis with abutment of the proximal extraforaminal left L1 nerve root by the diffuse disc bulge (axial T2WI 10/27, series 22).   L2-L3: Severe disc height loss, diffuse disc spur complex, facet arthropathy and ligamentum flavum thickening. There is severe right lateral recess stenosis compressing the descending right L3 nerve root. There is abutment of descending left L3 nerve root. There is mild mass effect on intraforaminal/proximal extraforaminal right L2 nerve root between hypertrophic facet and large intraforaminal/extraforaminal disc spur complex. Mild left neural foraminal stenosis. There is buckling of the cauda equina nerve roots at L2-L3 likely due to the distal compression at L3-L4.   L3-L4: Slight progression of severe compression of the thecal sac and numerous descending cauda equina nerve roots due to large diffuse disc spur complex, moderate hypertrophic facet arthropathy and ligamentum flavum thickening. There is most severe compression in the left lateral recess. There is severe right foraminal stenosis compressing the intraforaminal right L3 nerve root (sagittal T2WI 13/33). There is moderate-severe left foraminal stenosis with compression of the intraforaminal left L3 nerve root (sagittal T2WI 24/33).   L4-L5: Severe hypertrophic facet arthropathy bilaterally, diffuse disc spur complex. Severe lateral recess stenosis bilaterally compressing descending bilateral L3 nerve roots and overall moderate-severe central canal stenosis. There is severe bilateral foraminal stenosis compressing the intraforaminal bilateral L4 nerve roots. There is a right intraforaminal annular fissure.   L5-S1: There is  moderate-severe disc height loss, diffuse disc spur complex with bilateral intraforaminal extension, and abutment of the descending bilateral S1 nerve roots resulting in compression of the left descending S1 nerve root (axial T2WI 15/22). There is severe left foraminal stenosis compressing the intraforaminal left L5 nerve root. There mild-moderate right foraminal stenosis.       Progression of multilevel lumbar spondylosis compared to 05/02/2022:   1. Severe thecal sac compression at L3-L4 compressing the majority of descending cauda equina nerve roots, progressed. Multilevel severe neural foraminal stenosis and high-grade lateral recess stenosis result in additional sites of severe nerve root impingement (descending right L3 nerve root at L2-L3, intraforaminal bilateral L3 nerve roots at L3-L4, bilateral intraforaminal L4 nerve root at L4-L5, intraforaminal left L5 nerve root at L5-S1, descending left S1 nerve root at L5-S1).   2. Worsening facet arthropathy with increased joint effusions and extra-spinal synovial cysts. New mild paraspinal edema may relate to degenerative a shin edema or low-grade muscle strain.   3. New right acetabular paralabral cyst denoting a chronic tear of the posterosuperior right labrum.   4. Enlarged prostate gland measuring 6.6 cm in transverse dimension. Please correlate with PSA levels and urology follow-up recommended.   MACRO: Deshaun Springer discussed the significance and urgency of this critical finding by telephone with  Dr. Chandler Bean on 2/23/2024 at 8:30 pm.  (**-RCF-**) Findings:  See findings.   Signed by: Deshaun Springer 2/23/2024 8:41 PM Dictation workstation:   [id number]    MR brain wo IV contrast    Result Date: 2/23/2024  Interpreted By:  Deshaun Springer, STUDY: MR BRAIN WO IV CONTRAST; MR ANGIO HEAD WO IV CONTRAST; MR ANGIO NECK WO IV CONTRAST;  2/23/2024 7:45 pm   INDICATION: Signs/Symptoms:dizziness, weakness; Signs/Symptoms:Dizziness, weakness. Stroke  protocol.   COMPARISON: CT head w/o contrast dated 02/15/2024, CT head without contrast 07/22/2023   ACCESSION NUMBER(S): [id number]; [id number]; [id number]   ORDERING CLINICIAN: SUSANA URIAS   TECHNIQUE: Multiplanar, multi-sequence images of the brain were obtained without contrast.   3D time-of-flight MR angiography of the head and neck was performed. The images were reviewed as source images and maximum intensity projections.   FINDINGS: MRI BRAIN:   Diffusion weighted images show no evidence of acute ischemic infarct.   Mild periventricular and subcortical hemispheric T2/FLAIR white matter hyperintensities are most compatible with chronic small vessel ischemic disease.   There is redemonstration of a small subdural fluid collection (T1 hypointense, T2 hyperintense) overlying the left frontal probable convexity measuring no greater than 0.3 cm in maximal thickness (unchanged vs CT head 02/15/2024). There is no acute intraparenchymal hemorrhage, mass, mass-effect, or acute extra-axial hemorrhage.   The ventricular size and cerebral volume are prominent owing to parenchymal volume loss.   Normal morphology of midline structures. The craniovertebral junction is normal.   The orbits and globes are unremarkable.   The paranasal sinuses show no air-fluid levels or hemorrhage.   The mastoid air cells are clear.     MRA HEAD:   There is no hemodynamically significant intracranial stenosis or large vessel occlusion. 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     MRA NECK: Source images are motion degraded. The origins of the carotid and vertebral arteries are not included. Mild spin-dephasing artifact is noted at the carotid bulbs.   COMMON/INTERNAL CAROTID ARTERIES:  No occlusion, hemodynamically significant stenosis, or dissection.   VERTEBRAL ARTERIES:  No occlusion, hemodynamically  significant stenosis, or dissection.       MRI BRAIN: 1. No acute infarct, acute hemorrhage, or intracranial mass effect. 2. Unchanged small chronic left subdural fluid collection measuring no greater than 0.3 cm in maximal thickness. 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   MRA HEAD AND NECK: 1. No evidence of major vessel occlusion or significant stenosis on MRA head. 2. No evidence of major vessel occlusion or significant stenosis on MRA neck. 3. 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   MACRO: None.   Signed by: Deshaun Springer 2/23/2024 8:05 PM Dictation workstation:   [id number]    MR angio neck wo IV contrast    Result Date: 2/23/2024  Interpreted By:  Deshaun Springer, STUDY: MR BRAIN WO IV CONTRAST; MR ANGIO HEAD WO IV CONTRAST; MR ANGIO NECK WO IV CONTRAST;  2/23/2024 7:45 pm   INDICATION: Signs/Symptoms:dizziness, weakness; Signs/Symptoms:Dizziness, weakness. Stroke protocol.   COMPARISON: CT head w/o contrast dated 02/15/2024, CT head without contrast 07/22/2023   ACCESSION NUMBER(S): [id number]; [id number]; [id number]   ORDERING CLINICIAN: SUSANA URIAS   TECHNIQUE: Multiplanar, multi-sequence images of the brain were obtained without contrast.   3D time-of-flight MR angiography of the head and neck was performed. The images were reviewed as source images and maximum intensity projections.   FINDINGS: MRI BRAIN:   Diffusion weighted images show no evidence of acute ischemic infarct.   Mild periventricular and subcortical hemispheric T2/FLAIR white matter hyperintensities are most compatible with chronic small vessel ischemic disease.   There is redemonstration of a small subdural fluid collection (T1 hypointense, T2 hyperintense) overlying the left frontal probable convexity measuring no greater than  0.3 cm in maximal thickness (unchanged vs CT head 02/15/2024). There is no acute intraparenchymal hemorrhage, mass, mass-effect, or acute extra-axial hemorrhage.   The ventricular size and cerebral volume are prominent owing to parenchymal volume loss.   Normal morphology of midline structures. The craniovertebral junction is normal.   The orbits and globes are unremarkable.   The paranasal sinuses show no air-fluid levels or hemorrhage.   The mastoid air cells are clear.     MRA HEAD:   There is no hemodynamically significant intracranial stenosis or large vessel occlusion. 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     MRA NECK: Source images are motion degraded. The origins of the carotid and vertebral arteries are not included. Mild spin-dephasing artifact is noted at the carotid bulbs.   COMMON/INTERNAL CAROTID ARTERIES:  No occlusion, hemodynamically significant stenosis, or dissection.   VERTEBRAL ARTERIES:  No occlusion, hemodynamically significant stenosis, or dissection.       MRI BRAIN: 1. No acute infarct, acute hemorrhage, or intracranial mass effect. 2. Unchanged small chronic left subdural fluid collection measuring no greater than 0.3 cm in maximal thickness. 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   MRA HEAD AND NECK: 1. No evidence of major vessel occlusion or significant stenosis on MRA head. 2. No evidence of major vessel occlusion or significant stenosis on MRA neck. 3. 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   MACRO: None.   Signed by: Deshaun Springer 2/23/2024 8:05 PM Dictation workstation:   [id number]    MR angio head wo IV contrast    Result Date:  2/23/2024  Interpreted By:  Deshaun Springer, STUDY: MR BRAIN WO IV CONTRAST; MR ANGIO HEAD WO IV CONTRAST; MR ANGIO NECK WO IV CONTRAST;  2/23/2024 7:45 pm   INDICATION: Signs/Symptoms:dizziness, weakness; Signs/Symptoms:Dizziness, weakness. Stroke protocol.   COMPARISON: CT head w/o contrast dated 02/15/2024, CT head without contrast 07/22/2023   ACCESSION NUMBER(S): [id number]; [id number]; [id number]   ORDERING CLINICIAN: SUSANA URIAS   TECHNIQUE: Multiplanar, multi-sequence images of the brain were obtained without contrast.   3D time-of-flight MR angiography of the head and neck was performed. The images were reviewed as source images and maximum intensity projections.   FINDINGS: MRI BRAIN:   Diffusion weighted images show no evidence of acute ischemic infarct.   Mild periventricular and subcortical hemispheric T2/FLAIR white matter hyperintensities are most compatible with chronic small vessel ischemic disease.   There is redemonstration of a small subdural fluid collection (T1 hypointense, T2 hyperintense) overlying the left frontal probable convexity measuring no greater than 0.3 cm in maximal thickness (unchanged vs CT head 02/15/2024). There is no acute intraparenchymal hemorrhage, mass, mass-effect, or acute extra-axial hemorrhage.   The ventricular size and cerebral volume are prominent owing to parenchymal volume loss.   Normal morphology of midline structures. The craniovertebral junction is normal.   The orbits and globes are unremarkable.   The paranasal sinuses show no air-fluid levels or hemorrhage.   The mastoid air cells are clear.     MRA HEAD:   There is no hemodynamically significant intracranial stenosis or large vessel occlusion. 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     MRA NECK: Source images are motion degraded. The origins of the carotid and  vertebral arteries are not included. Mild spin-dephasing artifact is noted at the carotid bulbs.   COMMON/INTERNAL CAROTID ARTERIES:  No occlusion, hemodynamically significant stenosis, or dissection.   VERTEBRAL ARTERIES:  No occlusion, hemodynamically significant stenosis, or dissection.       MRI BRAIN: 1. No acute infarct, acute hemorrhage, or intracranial mass effect. 2. Unchanged small chronic left subdural fluid collection measuring no greater than 0.3 cm in maximal thickness. 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   MRA HEAD AND NECK: 1. No evidence of major vessel occlusion or significant stenosis on MRA head. 2. No evidence of major vessel occlusion or significant stenosis on MRA neck. 3. 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   MACRO: None.   Signed by: Deshaun Springer 2/23/2024 8:05 PM Dictation workstation:   [id number]    Transthoracic Echo (TTE) Complete    Result Date: 2/23/2024    [facility], [address] [place] [address] [id number] [phone] and                                 Fax [phone] TRANSTHORACIC ECHOCARDIOGRAM REPORT  Patient Name:      PETE Sherman Physician:    [id number] Raffaele Crenshaw MD Study Date:        2/23/2024           Ordering Provider:    [id number] SUSANA URIAS MRN/PID:           [id number]            Fellow: Accession#:        [id number]        Nurse: Date of Birth/Age: 1950 / 73      Sonographer:          Houston Garner ACS,                    years                                     RDCS, FASE Gender:            M                   Additional Staff: Height:            172.72 cm           Admit Date:           2/22/2024 Weight:            63.50 kg            Admission Status:     Observation -                                                               Priority discharge BSA / BMI:         1.76 m2 / 21.29     Encounter#:           [id number]                    kg/m2                                        Department Location:  [facility] Blood Pressure: 110 /59 mmHg Study Type:    TRANSTHORACIC ECHO (TTE) COMPLETE Diagnosis/ICD: Other chest pain-R07.89 Indication:    Chest Pain CPT Code:      Echo Complete w Full [id number] Patient History: Pertinent History: Chest Pain. Dizzy. Study Detail: The following Echo studies were performed: 2D, M-Mode, Doppler and               color flow.  PHYSICIAN INTERPRETATION: Left Ventricle: The left ventricular systolic function is normal, with an estimated ejection fraction of 65-70%. There are no regional wall motion abnormalities. The left ventricular cavity size is normal. Spectral Doppler shows an impaired relaxation pattern of left ventricular diastolic filling. Left Atrium: The left atrium is mildly dilated. Right Ventricle: The right ventricle is normal in size. There is normal right ventricular global systolic function. Right Atrium: The right atrium is normal in size. Aortic Valve: The aortic valve is probably trileaflet. There is evidence of mildly elevated transaortic gradients consistent with sclerosis of the aortic valve. There is no evidence of aortic valve regurgitation. The peak instantaneous gradient of the aortic valve is 11.4 mmHg. The mean gradient of the aortic valve is 6.0 mmHg. Mitral Valve: The mitral valve is normal in structure. There is no evidence of mitral valve regurgitation. Tricuspid Valve: The tricuspid valve is structurally normal. No evidence of tricuspid regurgitation. Pulmonic Valve: The pulmonic valve is not well visualized. There is no indication of pulmonic valve regurgitation. Pericardium: There is no pericardial effusion noted. Aorta: The aortic root is normal.  CONCLUSIONS:  1. Left ventricular systolic function is normal  with a 65-70% estimated ejection fraction.  2. Spectral Doppler shows an impaired relaxation pattern of left ventricular diastolic filling.  3. Aortic valve sclerosis. QUANTITATIVE DATA SUMMARY: 2D MEASUREMENTS:                           Normal Ranges: Ao Root d:     3.90 cm    (2.0-3.7cm) LAs:           4.00 cm    (2.7-4.0cm) IVSd:          1.29 cm    (0.6-1.1cm) LVPWd:         0.86 cm    (0.6-1.1cm) LVIDd:         4.88 cm    (3.9-5.9cm) LVIDs:         2.33 cm LV Mass Index: 109.9 g/m2 LV % FS        52.3 % LA VOLUME:                               Normal Ranges: LA Vol A4C:        40.3 ml    (22+/-6mL/m2) LA Vol A2C:        47.2 ml LA Vol BP:         44.5 ml LA Vol Index A4C:  22.9ml/m2 LA Vol Index A2C:  26.9 ml/m2 LA Vol Index BP:   25.3 ml/m2 LA Area A4C:       16.0 cm2 LA Area A2C:       17.0 cm2 LA Major Axis A4C: 5.4 cm LA Major Axis A2C: 5.2 cm LA Volume Index:   23.0 ml/m2 RA VOLUME BY A/L METHOD:                       Normal Ranges: RA Area A4C: 15.0 cm2 M-MODE MEASUREMENTS:                  Normal Ranges: Ao Root: 3.60 cm (2.0-3.7cm) LAs:     4.50 cm (2.7-4.0cm) AORTA MEASUREMENTS:                    Normal Ranges: Asc Ao, d: 3.70 cm (2.1-3.4cm) LV SYSTOLIC FUNCTION BY 2D PLANIMETRY (MOD):                     Normal Ranges: EF-A4C View: 76.8 % (>=55%) EF-A2C View: 74.0 % EF-Biplane:  75.4 % LV DIASTOLIC FUNCTION:                              Normal Ranges: MV Peak E:        0.83 m/s   (0.7-1.2 m/s) MV Peak A:        0.48 m/s   (0.42-0.7 m/s) E/A Ratio:        1.73       (1.0-2.2) MV lateral e'     0.12 m/s MV medial e'      0.09 m/s PulmV Sys Yoni:    78.40 cm/s PulmV Rawls Yoni:   57.30 cm/s PulmV S/D Yoni:    1.40 PulmV A Revs Yoni: 29.10 cm/s MITRAL VALVE:                 Normal Ranges: MV DT: 215 msec (150-240msec) AORTIC VALVE:                                    Normal Ranges: AoV Vmax:                1.69 m/s  (<=1.7m/s) AoV Peak P.4 mmHg (<20mmHg) AoV Mean P.0 mmHg   (1.7-11.5mmHg) LVOT Max Yoni:            1.35 m/s  (<=1.1m/s) AoV VTI:                 40.20 cm  (18-25cm) LVOT VTI:                33.30 cm LVOT Diameter:           1.80 cm   (1.8-2.4cm) AoV Area, VTI:           2.11 cm2  (2.5-5.5cm2) AoV Area,Vmax:           2.03 cm2  (2.5-4.5cm2) AoV Dimensionless Index: 0.83  RIGHT VENTRICLE: RV Basal 3.56 cm RV Mid   2.52 cm TAPSE:   28.3 mm RV s'    0.19 m/s TRICUSPID VALVE/RVSP:                             Normal Ranges: Peak TR Velocity: 2.40 m/s RV Syst Pressure: 26.0 mmHg (< 30mmHg) IVC Diam:         2.20 cm PULMONIC VALVE:                      Normal Ranges: PV Max Yoni: 1.0 m/s  (0.6-0.9m/s) PV Max P.3 mmHg Pulmonary Veins: PulmV A Revs Yoni: 29.10 cm/s PulmV Rawls Yoni:   57.30 cm/s PulmV S/D Yoni:    1.40 PulmV Sys Yoni:    78.40 cm/s  [id number] Raffaele Crenshaw MD Electronically signed on 2024 at 9:35:24 AM  ** Final **        Assessment/Plan    The patient is stable from a neurological standpoint.  The patient has both cervical and lumbar stenosis and both appear to be quite severe and require surgery.  The patient states that he does not wish to have surgery at this time.  I told him that if he falls he is at risk for quadriplegia given his severe cervical stenosis.  The patient does have a PET scan coming up to rule out lung cancer this week.  I would certainly continue all of his medicines and stroke risk factor modification.  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  No intervention is necessary for this.  The patient can follow-up with neurosurgery as an outpatient for this.  The patient's B12 is on the low side and the methylmalonic acid level is pending.  I believe that the patient would benefit from going to a skilled nursing facility.  I discussed all these issues in detail with the patient and Dr. Hanna who was in the room and answered all their questions.  I will continue to follow the patient while he is in the hospital.  On  discharge, the patient will follow-up with me in the office in 4 months.        Perry Baker MD

## 2024-02-24 NOTE — H&P
History Of Present Illness  Oni Newsome is a 73-year-old male with past medical history of Alzheimer's dementia, hypertension, hyperlipidemia, COPD, nicotine use disorder, AAA, PAD s/p angioplasty LLE and left great toe amputation, BPH, macular degeneration, and cataracts.  Patient presents to the hospital with multiple complaints.  He is currently alert and oriented x 3, slow to respond at times with flat affect.  Reports sharp left chest pains, worse with inspiration, daily but not constant.  Activity makes symptoms worse.  Reports generalized weakness, but feels left side is more weak than right.  Dizziness, feels like the room is spinning, started over the past several days.  Denies prior history of vertigo.  He is concerned that he is not able to take care of himself any longer at home.  States he is largely nonambulatory.  Denies support from family/friends.  States he is unable to make his meals has lost significant mental weight.  Feels like he may hurt himself on accident, no suicidal ideation.  ROS additionally positive for headaches, blurry vision, generalized weakness/fatigue, feeling sad/depressed, cough, wheezing, shortness of breath, swollen tender nodes in his neck, low back pain, constipation (last bowel movement 3 days ago), black stools.  Denies wounds, urinary complaints, nausea, vomiting.  States that he is compliant with medications, but is out of his blood thinner (history of being on Plavix).  He would like placed at the skilled nursing facility    ER course: Hemodynamically stable, afebrile, SpO2 95% on room air.  EKG showed sinus bradycardia, ventricular rate 59 bpm, no acute ST changes. CXR showed no acute process. In the ED he received Toradol, meclizine, and Zofran.  WBC 6.8, hemoglobin 12.4/hematocrit 36.4 (stable at baseline), platelets 258.  CMP was unremarkable.  Magnesium 2.07.  Lactate 0.7.  BNP 32.  High-sensitivity troponin 7.  Influenza, RSV, and coronavirus negative.  UA  negative for infection.  Drug tox negative. CTA chest on 2/15/2024 noted newly seen large left hilar node, newly seen bibasilar segmental mucous plugging.  Newly seen endplate deformity under L3 and L4 of indeterminate acuity, stable focal bulge right distal common iliac artery which is partially thrombosed.    Past medical history: As above  Past surgical history: as above  Social history: long term smoker 1ppd, no illicit drug use, no alcohol abuse.  [place] vet. Wife in SNF.  Family History: mother - 3 heart attacks     Past Medical History  Past Medical History:   Diagnosis Date    Abdominal pain 02/19/2024    Abnormal weight loss 02/19/2024    Activity, unspecified 02/19/2024    Acute encephalopathy 02/19/2024    Age-related nuclear cataract, bilateral 02/19/2024    Age-related physical debility 02/19/2024    Atherosclerosis of artery of left lower extremity (CMS/HCC) 02/19/2024    Atherosclerosis of native arteries of extremities with rest pain, left leg (CMS/HCC) 02/19/2024    Benign prostatic hyperplasia 05/02/2002    Carbuncle 02/19/2024    Cellulitis 02/18/2023    Cervical radiculopathy 02/19/2024    Chronic obstructive pulmonary disease (CMS/HCC) 02/19/2024    Chronic pain 02/19/2024    Constipation 02/19/2024    Degeneration of cervical intervertebral disc 02/19/2024    Delusional disorder (CMS/[facility]) 02/19/2024    Dementia (CMS/[facility]) 02/19/2024    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 (CMS/[facility]) 02/19/2024    Dementia with behavioral disturbance (CMS/[facility]) 02/19/2024    Depressive disorder 05/02/2002    Financial insecurity 02/19/2024    Gangrene (CMS/HCC) 02/19/2024    Gastritis due to Helicobacter species 02/19/2024    Hematemesis 02/19/2024    High cholesterol     Hyperammonemia (CMS/HCC) 02/19/2024    Hyperlipidemia 02/19/2024    Hyperlipidemia 02/19/2024    Hypertension     Hypokalemia 02/19/2024    Hyponatremia 02/19/2024    Impotence of organic origin 02/19/2024    Low back pain 02/19/2024     Low back pain, unspecified 02/19/2024    Lumbar radiculopathy 02/19/2024    Lumbar spondylosis 02/19/2024    Lumbar stenosis with neurogenic claudication 02/19/2024    Macular degeneration 02/19/2024    Mild cognitive impairment of uncertain or unknown etiology 02/19/2024    Mixed hyperlipidemia 05/02/2002    Morbid obesity (CMS/[facility]) 05/02/2002    Multiple nodules of lung 02/19/2024    Neurosyphilis 02/19/2024    Nicotine dependence 02/19/2024    Nicotine dependence, unspecified, uncomplicated 02/19/2024    Noncompliance with treatment 02/19/2024    Obesity 02/19/2024    Obesity with body mass index 30 or greater 02/19/2024    PAD (peripheral artery disease) (CMS/[facility]) 02/24/2023    Polyp of colon 02/19/2024    Primary hypertension 02/19/2024    Prolonged Q-T interval on ECG 02/19/2024    Proteinuria 02/19/2024    Psychosis (CMS/[facility]) 02/19/2024    Psychosis, paranoid, involutional (CMS/[facility]) 02/19/2024    Radiculopathy, lumbar region 02/19/2024    Rash and other nonspecific skin eruption 02/19/2024    Subacute subdural hematoma (CMS/[facility]) 02/19/2024    Subdural hemorrhage (CMS/[facility]) 02/19/2024    Supraventricular tachycardia 02/19/2024    Tobacco use disorder 05/02/2002    Unspecified dementia, moderate, with anxiety (CMS/[facility]) 02/19/2024    Unspecified dementia, unspecified severity, with agitation (CMS/[facility]) 02/19/2024    Urinary obstruction 02/19/2024       Surgical History  Past Surgical History:   Procedure Laterality Date    TOE AMPUTATION          Social History  He reports that he has been smoking cigarettes. He has been smoking an average of 1 pack per day. He has never used smokeless tobacco. He reports that he does not currently use alcohol. He reports that he does not use drugs.    Family History  Family History   Problem Relation Name Age of Onset    Heart disease Mother          Allergies  Gabapentin    Review of Systems  10 point ROS negative except as noted above in HPI    Physical Exam  Constitutional:   "     General: He is awake. He is not in acute distress.     Appearance: Normal appearance. He is not toxic-appearing.   HENT:      Head: Atraumatic.      Nose: Nose normal.      Mouth/Throat:      Mouth: Mucous membranes are moist.   Eyes:      Extraocular Movements: Extraocular movements intact.      Conjunctiva/sclera: Conjunctivae normal.      Pupils: Pupils are equal, round, and reactive to light.   Cardiovascular:      Rate and Rhythm: Normal rate and regular rhythm.      Pulses: Normal pulses.      Heart sounds: No murmur heard.  Pulmonary:      Effort: Pulmonary effort is normal.      Breath sounds: Wheezing and rhonchi present.   Abdominal:      General: Bowel sounds are normal. There is no distension.      Palpations: Abdomen is soft.      Tenderness: There is no abdominal tenderness. There is no guarding.   Musculoskeletal:         General: No swelling, deformity or signs of injury. Normal range of motion.      Cervical back: Neck supple.      Right lower leg: No edema.      Left lower leg: No edema.   Skin:     General: Skin is warm and dry.      Capillary Refill: Capillary refill takes less than 2 seconds.      Findings: No ecchymosis, erythema or wound.   Neurological:      General: No focal deficit present.      Mental Status: He is alert and oriented to person, place, and time.   Psychiatric:         Mood and Affect: Affect is flat.         Speech: Speech is delayed.         Behavior: Behavior is cooperative.         Thought Content: Thought content normal.            Last Recorded Vitals  Blood pressure 125/60, pulse 54, temperature 35.8 °C (96.4 °F), temperature source Temporal, resp. rate 18, height 1.727 m (5' 7.99\"), weight 63.5 kg (139 lb 15.9 oz), SpO2 94 %.    Relevant Results    XR chest 2 views    Result Date: 2/22/2024  STUDY: Chest Radiographs;  2/22/24 at 1:05 PM INDICATION: Cough. COMPARISON: Chest XR 7/22/23. ACCESSION NUMBER(S): [id number] ORDERING CLINICIAN: SHIKHA LONG TECHNIQUE:  " Frontal and lateral chest. (three images) FINDINGS: CARDIOMEDIASTINAL SILHOUETTE: Cardiomediastinal silhouette is normal in size and configuration.  LUNGS: Lungs are clear.  ABDOMEN: No remarkable upper abdominal findings.  BONES: No acute osseous changes.    No acute process. Signed by Prince Iraheta MD    ECG 12 lead    Result Date: 2/22/2024  Sinus bradycardia Cannot rule out Anterior infarct , age undetermined Abnormal ECG When compared with ECG of 15-FEB-2024 14:39, PREVIOUS ECG IS PRESENT    ECG 12 lead    Result Date: 2/16/2024  Sinus rhythm See ED provider note for full interpretation and clinical correlation Confirmed by Juliet Prasad ([id number]) on 2/16/2024 3:04:28 PM    CT head wo IV contrast    Result Date: 2/15/2024  Interpreted By:  Fidencio Zhu, STUDY: CT HEAD WO IV CONTRAST;  2/15/2024 5:01 pm   INDICATION: Trauma. Signs/Symptoms:hx of subdural, dizziness, on plavix.   COMPARISON: None.   ACCESSION NUMBER(S): [id number]   ORDERING CLINICIAN: KIAN MCINTYRE   TECHNIQUE: Axial noncontrast head CT   FINDINGS: The ventricles, cisterns and sulci are prominent, consistent with mild diffuse volume loss. There are areas of nonspecific white matter hypodensity, which are probably age-related or microvascular in nature.   Gray-white differentiation is intact and there is no evidence of acute cortical infarct. No acute mass, mass effect or midline shift is seen. There is no evidence of acute hemorrhage. Pre-existing left subdural collection is nearly completely resolved with 2 mm residual bandlike thickening overlying the cerebral convexity coronal series 205, image 42 compared with 9 mm. No significant remaining right-sided subdural abnormality.   The visualized paranasal sinuses are clear.   Calvarium: No detected fracture.   Brain Injury (BIG) guidelines CT values:   Skull fracture: No SDH (subdural hematoma): None detected EDH (epidural hemtoma): None detected IPH (intraparenchymal hemorrhage):  None detected SAH (subarachnoid hemorrhage): None detected IVH (intraventricular hemorrhage): No   Reference: Sahil CORADO, Bindu RS, Stephanie M, et al. The BIG (brain injury guidelines) project: defining the management of traumatic brain injury by acute care surgeons. J Trauma Acute Care Surg. 2014;76:405z215.       No acute intracranial abnormality was identified.   Near complete resolution of left-sided subdural hematoma with trace residual bandlike thickening.   No significant remaining right-sided subdural collection.   MACRO: None     Signed by: Fidencio Zhu 2/15/2024 6:31 PM Dictation workstation:   [id number]    CT angio chest abdomen pelvis    Result Date: 2/15/2024  Interpreted By:  Fidencio Zhu, STUDY: CT ANGIO CHEST ABDOMEN PELVIS;  2/15/2024 5:01 pm   INDICATION: Signs/Symptoms:hx of AAA, chest pain, back pain.   COMPARISON: April 6, 2018 CT chest abdomen and pelvis. July 27, 2019 CT abdomen and pelvis   ACCESSION NUMBER(S): [id number]   ORDERING CLINICIAN: KIAN MCINTYRE   TECHNIQUE: Axial non-contrast images of the chest, abdomen, and pelvis  with coronal and sagittal reformatted images. Axial CT images of the chest, abdomen and pelvis after the intravenous administration of 90 mL of Omnipaque 350 using CT angiographic technique with coronal and sagittal reformatted images.  MIP images were provided and reviewed. 3D reconstructions were performed on a separate independent workstation.   FINDINGS: Likely technically adequate although there is image degradation related to motion limiting the accuracy especially the abdominal portion.   VASCULAR:   PULMONARY ARTERIES:   No gross central pulmonary embolism. Limited enhancement of the peripheral vessels related to technique.   THORACIC AORTA:  Non-contrast images show no evidence of acute intramural hematoma. No thoracic aortic aneurysm or dissection. Stable scattered atherosclerosis.   ABDOMINAL AORTA: No abdominal aortic dissection. Stable focal  bulge of the distal right common iliac artery measuring 14 mm transverse diameter with eccentric soft tissue density and calcified plaque series 501, image 437. there is moderate atherosclerosis abdominal aorta and branch vessels.   ABDOMINAL AND PELVIC ARTERIES: No hemodynamically significant stenosis or occlusion.     CHEST:   MEDIASTINUM AND LYMPH NODES: Interval enlargement of left hilar lymph node which measures 2.4 x 1.8 cm series 501, image 119 not evident on prior chest CT.   Heart: Stable appearance. Scattered coronary artery calcifications. No significant pericardial effusion.   LUNG, PLEURA, LARGE AIRWAYS: Allowing for difference in degree of lung expansion the overall aeration is similar with persistent mild mosaic attenuation and scattered multifocal small pulmonary nodules some of which are clustered including adjacent 5 mm nodules left lower lobe series 21, image 185 and 5 mm nodule right base image 211. No consolidative pneumonia caroline edema or effusion. Bibasilar subsegmental mucous plugging. Stable postinflammatory calcifications left hilar region.   OSSEOUS STRUCTURES/CHEST WALL:    No acute osseous abnormality.     ABDOMEN/PELVIS:   Arterial phase imaging limits evaluation of the solid organs.   ABDOMINAL WALL: Within normal limits.   LIVER: No significant abnormality. BILE DUCTS: No significant abnormality. GALLBLADDER: No significant abnormality.   PANCREAS: No significant abnormality.   SPLEEN: No significant abnormality.   ADRENALS: No significant abnormality.   KIDNEYS, URETERS, BLADDER: No significant abnormality.   VESSELS: See above.  No additional significant abnormality. LYMPH NODES: No pathologic sized nodes by CT criteria. RETROPERITONEUM:  No significant abnormality.   BOWEL: No inflammatory bowel wall thickening or dilatation.  Normal appendix.   PERITONEUM:   No significant ascites, free air, or fluid collection.   REPRODUCTIVE ORGANS: Stable prostatomegaly measuring 6.2 cm  transverse diameter indenting the base the urinary bladder.   OSSEOUS STRUCTURES:  No acute osseous abnormality. Compared with July 27, 2019 CT abdomen and pelvis increase superior endplate deformities C3 and C4 compatible with interval Schmorl's nodes and degenerative changes indeterminate acuity.       No thoracic or abdominal aortic aneurysm or dissection.   Newly seen enlarged left hilar node of indeterminate cause. The finding could be due to inflammatory lymph node, or neoplasm. Recommend further evaluation. Consider PET-CT, tissue sampling or short-term follow-up.   Newly seen bibasilar subsegmental mucous plugging.   Newly seen endplate deformities superior L3 and L4 of indeterminate acuity.   Stable focal bulge right distal common iliac artery which is partially thrombosed.   Additional stable findings as reported.   Signed by: Fidencio Zhu 2/15/2024 6:26 PM Dictation workstation:   [id number]     Results for orders placed or performed during the hospital encounter of 02/22/24 (from the past 24 hour(s))   CBC   Result Value Ref Range    WBC 7.9 4.4 - 11.3 x10*3/uL    nRBC 0.0 0.0 - 0.0 /100 WBCs    RBC 3.66 (L) 4.50 - 5.90 x10*6/uL    Hemoglobin 11.7 (L) 13.5 - 17.5 g/dL    Hematocrit 35.3 (L) 41.0 - 52.0 %    MCV 96 80 - 100 fL    MCH 32.0 26.0 - 34.0 pg    MCHC 33.1 32.0 - 36.0 g/dL    RDW 15.1 (H) 11.5 - 14.5 %    Platelets 246 150 - 450 x10*3/uL   Basic Metabolic Panel   Result Value Ref Range    Glucose 80 74 - 99 mg/dL    Sodium 139 136 - 145 mmol/L    Potassium 4.2 3.5 - 5.3 mmol/L    Chloride 108 (H) 98 - 107 mmol/L    Bicarbonate 26 21 - 32 mmol/L    Anion Gap 9 (L) 10 - 20 mmol/L    Urea Nitrogen 19 6 - 23 mg/dL    Creatinine 0.68 0.50 - 1.30 mg/dL    eGFR >90 >60 mL/min/1.73m*2    Calcium 9.0 8.6 - 10.3 mg/dL   Lipid Panel   Result Value Ref Range    Cholesterol 100 0 - 199 mg/dL    HDL-Cholesterol 37.4 mg/dL    Cholesterol/HDL Ratio 2.7     LDL Calculated 50 <=99 mg/dL    VLDL 13 0 - 40  mg/dL    Triglycerides 64 0 - 149 mg/dL    Non HDL Cholesterol 63 0 - 149 mg/dL   Vitamin B12   Result Value Ref Range    Vitamin B12 276 211 - 911 pg/mL   TSH   Result Value Ref Range    Thyroid Stimulating Hormone 2.63 0.44 - 3.98 mIU/L   Transthoracic Echo (TTE) Complete   Result Value Ref Range    AV pk roselyn 1.69 m/s    AV mn grad 6.0 mmHg    LVOT diam 1.80 cm    LV biplane EF 75 %    MV E/A ratio 1.73     LA vol index A/L 25.3 ml/m2    Tricuspid annular plane systolic excursion 2.8 cm    RV free wall pk S' 18.60 cm/s    LVIDd 4.88 cm    RVSP 26.0 mmHg    Aortic Valve Area by Continuity of VTI 2.11 cm2    Aortic Valve Area by Continuity of Peak Velocity 2.03 cm2    AV pk grad 11.4 mmHg    LV A4C EF 76.8           Assessment/Plan   Principal Problem:    Dizziness    73-year-old male with past medical history of Alzheimer's dementia, hypertension, hyperlipidemia, COPD, nicotine use disorder, AAA, PAD s/p angioplasty LLE and left great toe amputation, BPH, macular degeneration, and cataracts.  Patient presents to the hospital with multiple complaints most notably for dizziness, chest pain, generalized weakness/fatigue, and difficulty with ADLs at home.    #Dizziness  #Tremor, left upper extremity  #Weakness, generalized    Telemetry monitoring  Neurology consult for input  MRI of the brain, MRA of the head and neck  Echocardiogram  Orthostatic vitals  PT/OT with [facility]  Social work for discharge planning, patient concerned he can no longer care for himself    #Chest pain    Cardiology consult, appreciate input  Reports left chest discomfort, sounds pleuritic in nature  Initial ischemic workup in the ED negative.  Trend troponins.  Echocardiogram  Lipid panel in a.m.  Aspirin 81 mg daily    #COPD  #Nicotine use disorder    Patient is wheezing/coughing on exam  Nebulizers as needed and scheduled  If not improved may benefit from course of steroids  Currently on room air, supplemental oxygen as needed  RT  consult  Nicotine patch and gum while hospitalized  Mucinex twice daily    #Constipation    Last bowel movement 3 days ago, he does report black stools, however low suspicion for GI bleed  MiraLAX daily  Monitor    #Enlarged left hilar node  #Lung nodules    OP follow up, already referred to lung nodule clinic    Chronic conditions:  #PAD s/p left lower extremity angioplasty  #History AAA  #Hypertension  # Hyperlipidemia  #BPH  #Macular degeneration  #Cataracts    Continue with patient's home medications once entered by nursing/pharmacy    #DVT prophylaxis  SCDs  Lovenox subcutaneous      Michael Bean DO

## 2024-02-25 LAB
ANION GAP SERPL CALC-SCNC: 8 MMOL/L (ref 10–20)
BUN SERPL-MCNC: 15 MG/DL (ref 6–23)
CALCIUM SERPL-MCNC: 8.4 MG/DL (ref 8.6–10.3)
CHLORIDE SERPL-SCNC: 104 MMOL/L (ref 98–107)
CO2 SERPL-SCNC: 28 MMOL/L (ref 21–32)
CREAT SERPL-MCNC: 0.63 MG/DL (ref 0.5–1.3)
EGFRCR SERPLBLD CKD-EPI 2021: >90 ML/MIN/1.73M*2
ERYTHROCYTE [DISTWIDTH] IN BLOOD BY AUTOMATED COUNT: 15 % (ref 11.5–14.5)
EST. AVERAGE GLUCOSE BLD GHB EST-MCNC: 105 MG/DL
GLUCOSE SERPL-MCNC: 77 MG/DL (ref 74–99)
HBA1C MFR BLD: 5.3 %
HCT VFR BLD AUTO: 33.5 % (ref 41–52)
HGB BLD-MCNC: 11.3 G/DL (ref 13.5–17.5)
MCH RBC QN AUTO: 32.1 PG (ref 26–34)
MCHC RBC AUTO-ENTMCNC: 33.7 G/DL (ref 32–36)
MCV RBC AUTO: 95 FL (ref 80–100)
NRBC BLD-RTO: 0 /100 WBCS (ref 0–0)
PLATELET # BLD AUTO: 225 X10*3/UL (ref 150–450)
POTASSIUM SERPL-SCNC: 4.2 MMOL/L (ref 3.5–5.3)
RBC # BLD AUTO: 3.52 X10*6/UL (ref 4.5–5.9)
SODIUM SERPL-SCNC: 136 MMOL/L (ref 136–145)
WBC # BLD AUTO: 8.5 X10*3/UL (ref 4.4–11.3)

## 2024-02-25 PROCEDURE — 2500000001 HC RX 250 WO HCPCS SELF ADMINISTERED DRUGS (ALT 637 FOR MEDICARE OP)

## 2024-02-25 PROCEDURE — G0378 HOSPITAL OBSERVATION PER HR: HCPCS

## 2024-02-25 PROCEDURE — 99231 SBSQ HOSP IP/OBS SF/LOW 25: CPT | Performed by: NEUROLOGICAL SURGERY

## 2024-02-25 PROCEDURE — 85027 COMPLETE CBC AUTOMATED: CPT | Performed by: INTERNAL MEDICINE

## 2024-02-25 PROCEDURE — S4991 NICOTINE PATCH NONLEGEND: HCPCS | Performed by: NURSE PRACTITIONER

## 2024-02-25 PROCEDURE — 2500000001 HC RX 250 WO HCPCS SELF ADMINISTERED DRUGS (ALT 637 FOR MEDICARE OP): Performed by: NURSE PRACTITIONER

## 2024-02-25 PROCEDURE — 94640 AIRWAY INHALATION TREATMENT: CPT

## 2024-02-25 PROCEDURE — 2500000004 HC RX 250 GENERAL PHARMACY W/ HCPCS (ALT 636 FOR OP/ED): Performed by: NURSE PRACTITIONER

## 2024-02-25 PROCEDURE — 2500000002 HC RX 250 W HCPCS SELF ADMINISTERED DRUGS (ALT 637 FOR MEDICARE OP, ALT 636 FOR OP/ED): Performed by: NURSE PRACTITIONER

## 2024-02-25 PROCEDURE — 99232 SBSQ HOSP IP/OBS MODERATE 35: CPT | Performed by: PSYCHIATRY & NEUROLOGY

## 2024-02-25 PROCEDURE — 2500000002 HC RX 250 W HCPCS SELF ADMINISTERED DRUGS (ALT 637 FOR MEDICARE OP, ALT 636 FOR OP/ED): Performed by: INTERNAL MEDICINE

## 2024-02-25 PROCEDURE — 80048 BASIC METABOLIC PNL TOTAL CA: CPT | Performed by: INTERNAL MEDICINE

## 2024-02-25 PROCEDURE — 99232 SBSQ HOSP IP/OBS MODERATE 35: CPT | Performed by: INTERNAL MEDICINE

## 2024-02-25 PROCEDURE — 97116 GAIT TRAINING THERAPY: CPT | Mod: CQ,GP

## 2024-02-25 PROCEDURE — 36415 COLL VENOUS BLD VENIPUNCTURE: CPT | Performed by: INTERNAL MEDICINE

## 2024-02-25 PROCEDURE — 2500000001 HC RX 250 WO HCPCS SELF ADMINISTERED DRUGS (ALT 637 FOR MEDICARE OP): Performed by: INTERNAL MEDICINE

## 2024-02-25 PROCEDURE — 97535 SELF CARE MNGMENT TRAINING: CPT | Mod: CQ,GP

## 2024-02-25 RX ADMIN — POLYETHYLENE GLYCOL 3350 17 G: 17 POWDER, FOR SOLUTION ORAL at 08:55

## 2024-02-25 RX ADMIN — TRAMADOL HYDROCHLORIDE 25 MG: 50 TABLET ORAL at 09:15

## 2024-02-25 RX ADMIN — CLOPIDOGREL 75 MG: 75 TABLET ORAL at 08:57

## 2024-02-25 RX ADMIN — DONEPEZIL HYDROCHLORIDE 10 MG: 10 TABLET ORAL at 20:17

## 2024-02-25 RX ADMIN — IPRATROPIUM BROMIDE AND ALBUTEROL SULFATE 3 ML: .5; 3 SOLUTION RESPIRATORY (INHALATION) at 12:13

## 2024-02-25 RX ADMIN — ASPIRIN 81 MG 81 MG: 81 TABLET ORAL at 20:17

## 2024-02-25 RX ADMIN — PREGABALIN 50 MG: 50 CAPSULE ORAL at 09:06

## 2024-02-25 RX ADMIN — IPRATROPIUM BROMIDE AND ALBUTEROL SULFATE 3 ML: .5; 3 SOLUTION RESPIRATORY (INHALATION) at 19:28

## 2024-02-25 RX ADMIN — ESCITALOPRAM OXALATE 5 MG: 10 TABLET ORAL at 08:56

## 2024-02-25 RX ADMIN — IPRATROPIUM BROMIDE AND ALBUTEROL SULFATE 3 ML: .5; 3 SOLUTION RESPIRATORY (INHALATION) at 06:29

## 2024-02-25 RX ADMIN — Medication 1 TABLET: at 09:06

## 2024-02-25 RX ADMIN — PREGABALIN 50 MG: 50 CAPSULE ORAL at 20:17

## 2024-02-25 RX ADMIN — NICOTINE 1 PATCH: 21 PATCH, EXTENDED RELEASE TRANSDERMAL at 09:04

## 2024-02-25 RX ADMIN — ENOXAPARIN SODIUM 40 MG: 40 INJECTION SUBCUTANEOUS at 08:56

## 2024-02-25 RX ADMIN — Medication 2000 UNITS: at 08:57

## 2024-02-25 RX ADMIN — ATORVASTATIN CALCIUM 80 MG: 80 TABLET, FILM COATED ORAL at 20:17

## 2024-02-25 RX ADMIN — SENNOSIDES 8.6 MG: 8.6 TABLET, FILM COATED ORAL at 20:17

## 2024-02-25 RX ADMIN — ACETAMINOPHEN 650 MG: 325 TABLET ORAL at 22:01

## 2024-02-25 ASSESSMENT — COGNITIVE AND FUNCTIONAL STATUS - GENERAL
TURNING FROM BACK TO SIDE WHILE IN FLAT BAD: A LITTLE
WALKING IN HOSPITAL ROOM: A LITTLE
DRESSING REGULAR LOWER BODY CLOTHING: A LITTLE
HELP NEEDED FOR BATHING: A LITTLE
CLIMB 3 TO 5 STEPS WITH RAILING: A LOT
WALKING IN HOSPITAL ROOM: A LITTLE
MOVING FROM LYING ON BACK TO SITTING ON SIDE OF FLAT BED WITH BEDRAILS: A LITTLE
MOBILITY SCORE: 16
STANDING UP FROM CHAIR USING ARMS: A LITTLE
STANDING UP FROM CHAIR USING ARMS: A LITTLE
MOVING TO AND FROM BED TO CHAIR: A LITTLE
MOBILITY SCORE: 20
DAILY ACTIVITIY SCORE: 21
DRESSING REGULAR UPPER BODY CLOTHING: A LITTLE
CLIMB 3 TO 5 STEPS WITH RAILING: TOTAL

## 2024-02-25 ASSESSMENT — PAIN SCALES - GENERAL
PAINLEVEL_OUTOF10: 3
PAINLEVEL_OUTOF10: 0 - NO PAIN
PAINLEVEL_OUTOF10: 0 - NO PAIN

## 2024-02-25 ASSESSMENT — PAIN - FUNCTIONAL ASSESSMENT
PAIN_FUNCTIONAL_ASSESSMENT: 0-10
PAIN_FUNCTIONAL_ASSESSMENT: 0-10

## 2024-02-25 ASSESSMENT — PAIN DESCRIPTION - LOCATION: LOCATION: GENERALIZED

## 2024-02-25 NOTE — PROGRESS NOTES
"Oni Newsome is a 73 y.o. male on day 0 of admission presenting with Dizziness.    Subjective   Today the patient is more alert and seems to be more aware of his condition.  He does now understand that the reason he is losing his balance and having trouble with his arms is from his cervical spine.  He did not want to have surgical intervention initially and he has not decided to change his mind but he did want to have the opportunity to see my partner to talk about surgery in the future.  I told him that we will be fine and we will have an appointment in the future may have to see Dr. Duvall for his cervical spine.  And to assess if he is a candidate for a C2-T2 instrumented fusion and decompression.  Yesterday he had his MRIs and MRIs and he has a 2 mm dilation of the distal basilar artery.  This seems to be a relatively benign incidental finding.  He is concerned because his brother had an aneurysm that killed him at the age of 57.  I tried to reassure him that the 2 mm finding at the top of the basilar system is a very low risk problem currently but it is not unreasonable for him to get an appointment in the next 3 months down at [facility] with one of the cerebrovascular doctors if he continues to be concerned.       Objective     Physical Exam: He was sharper today in his conversation and asked appropriate questions.  He still prefers to go to the nursing home to be with his wife at the present time.    Last Recorded Vitals  Blood pressure 105/55, pulse 53, temperature 36.4 °C (97.5 °F), resp. rate 16, height 1.727 m (5' 7.99\"), weight 63.5 kg (139 lb 15.9 oz), SpO2 97 %.  Intake/Output last 3 Shifts:  I/O last 3 completed shifts:  In: 355 (5.6 mL/kg) [P.O.:355]  Out: - (0 mL/kg)   Weight: 63.5 kg     Relevant Results                        Malnutrition Diagnosis Status: New  Malnutrition Diagnosis: Moderate malnutrition related to chronic disease or condition  As Evidenced by: moderate muscle and fat loss noted on " physical exam.  < 75% energy intake compared to estimated needs for > 1 month.  Pt is 84% of his IBW,  has dementia and is living by himself up until now  I agree with the dietitian's malnutrition diagnosis.      Assessment/Plan   Principal Problem:    Dizziness    Cervical spondylosis with myelopathy and malnutrition.  Patient would like to go to the skilled nursing facility to live with his wife for several weeks or months.  He would like to have the option of seeing my colleague to discuss possible cervical decompression with fusion.  We will give him that appointment.  He would also like to consider seeing one of the cerebrovascular team [place] at [facility] in a couple months as well.       I spent 30 minutes in the professional and overall care of this patient.      Delmar Tang MD

## 2024-02-25 NOTE — PROGRESS NOTES
Physical Therapy    Physical Therapy Treatment    Patient Name: Oni Newsome  MRN: [id number]  Today's Date: 2/25/2024  Time Calculation  Start Time: 1401  Stop Time: 1431  Time Calculation (min): 30 min       Assessment/Plan         PT Plan  Treatment/Interventions: Bed mobility, Transfer training, Gait training  PT Plan: Skilled PT  PT Frequency: 3 times per week  PT Discharge Recommendations: Moderate intensity level of continued care  PT - OK to Discharge: Yes (once cleared by medical team)      General Visit Information:   PT  Visit  PT Received On: 02/25/24       Subjective             Objective                        Treatments:  Therapeutic Exercise  Therapeutic Exercise Performed:  (sitting ble arom exs x 15 reps)    Bed Mobility  Bed Mobility:  (supine to sit and sit to supine sba)    Ambulation/Gait Training  Ambulation/Gait Training Performed:  (ambulated 45 feet using fixed wheeled walker min a x 1)  Transfers  Transfer:  (sit to stand cga)    Outcome Measures:  [facility] Basic Mobility  Turning from your back to your side while in a flat bed without using bedrails: A little  Moving from lying on your back to sitting on the side of a flat bed without using bedrails: A little  Moving to and from bed to chair (including a wheelchair): A little  Standing up from a chair using your arms (e.g. wheelchair or bedside chair): A little  To walk in hospital room: A little  Climbing 3-5 steps with railing: Total  Basic Mobility - Total Score: 16    Education Documentation  Precautions, taught by Alma Rosa Mcduffie PTA at 2/25/2024  3:10 PM.  Learner: Patient  Readiness: Acceptance  Method: Demonstration  Response: Demonstrated Understanding    Mobility Training, taught by Alma Rosa Mcduffie PTA at 2/25/2024  3:10 PM.  Learner: Patient  Readiness: Acceptance  Method: Demonstration  Response: Demonstrated Understanding    ADL Training, taught by Alma Rosa Mcduffie PTA at 2/25/2024  3:10 PM.  Learner: Patient  Readiness:  Acceptance  Method: Demonstration  Response: Demonstrated Understanding    Education Comments  No comments found.               Encounter Problems       Encounter Problems (Active)       PT Problem       STG - Pt will transition supine <> sitting with SBA  (Progressing)       Start:  02/23/24    Expected End:  03/08/24            STG - Pt will transfer STS with SBA  (Progressing)       Start:  02/23/24    Expected End:  03/08/24            STG - Pt will amb 30' using RW with CGA  (Progressing)       Start:  02/23/24    Expected End:  03/08/24               Pain - Adult          Safety       LTG - Patient will demonstrate safety requirements appropriate to situation/environment       Start:  02/22/24

## 2024-02-25 NOTE — CARE PLAN
The patient's goals for the shift include sleep well    The clinical goals for the shift include Remain free from falls, use walker    Over the shift, the patient did make progress toward the above goals. Barriers to progression include disease processes. Recommendations to address these barriers include continue plan of care.

## 2024-02-25 NOTE — PROGRESS NOTES
"Oni Newsome is a 73 y.o. male on day 0 of admission presenting with Dizziness.      Subjective   The patient is a 73-year-old male with history of dementia, hypertension, hyperlipidemia and multiple other medical problems who was admitted with feeling weak, eating poorly and having difficulties with ambulation.  Currently the patient feels that he is stable.  He denies any new neurological problems.  The patient states that he does not wish to have any surgery at this time.  He is interested in going to a skilled nursing facility.    Objective     Last Recorded Vitals  Blood pressure 105/55, pulse 53, temperature 36.4 °C (97.5 °F), resp. rate 16, height 1.727 m (5' 7.99\"), weight 63.5 kg (139 lb 15.9 oz), SpO2 97 %.    Physical Exam  Neurological Exam  The patient's mental status testing is alert and oriented ×3 with no evidence of aphasia or dysarthria.    The patient's cranial nerves 2, 3, 4, 5, 6 and 7 are all within normal limits.  The patient's motor testing shows normal tone and bulk with at least 5-/5 power in the upper and lower extremities bilaterally.  The patient does have giveaway weakness of all of his extremities with strength testing.    Relevant Results          [place] Coma Scale  Best Eye Response: Spontaneous  Best Verbal Response: Oriented  Best Motor Response: Follows commands  [place] Coma Scale Score: 15                No current facility-administered medications on file prior to encounter.     Current Outpatient Medications on File Prior to Encounter   Medication Sig Dispense Refill    acetaminophen (Tylenol) 325 mg tablet Take 2 tablets (650 mg) by mouth every 6 hours if needed.      albuterol 90 mcg/actuation inhaler Inhale 2 puffs every 6 hours if needed.      aspirin 81 mg chewable tablet Chew 1 tablet (81 mg) once daily at bedtime.      atorvastatin (Lipitor) 80 mg tablet Take 1 tablet (80 mg) by mouth once daily at bedtime.      cholecalciferol (Vitamin D-3) 50 MCG (2000 UT) tablet " Take 1 tablet (2,000 Units) by mouth once daily.      clopidogrel (Plavix) 75 mg tablet Take 1 tablet (75 mg) by mouth once daily.      donepezil (Aricept) 10 mg tablet Take 1 tablet (10 mg) by mouth once daily at bedtime.      ibuprofen 600 mg tablet Take 1 tablet (600 mg) by mouth every 8 hours if needed.      lisinopril 10 mg tablet Take 1 tablet (10 mg) by mouth once daily.      multivitamin with minerals (multivit-min-iron fum-folic ac) tablet Take 1 tablet by mouth once daily.      pregabalin (Lyrica) 50 mg capsule Take 1 capsule (50 mg) by mouth 2 times a day.      sennosides (Senokot) 8.6 mg tablet Take 1 tablet (8.6 mg) by mouth once daily at bedtime.      UNABLE TO FIND Take 1 capsule by mouth 2 times a day. Med Name: SciatiEase      [DISCONTINUED] carboxymethylcellulose (Refresh Plus) 0.5 % ophthalmic solution Administer 1 drop into both eyes 3 times a day as needed for dry eyes.         Results for orders placed or performed during the hospital encounter of 02/22/24 (from the past 96 hour(s))   ECG 12 lead   Result Value Ref Range    Ventricular Rate 59 BPM    Atrial Rate 59 BPM    MI Interval 162 ms    QRS Duration 96 ms    QT Interval 464 ms    QTC Calculation(Bazett) 459 ms    P Axis 51 degrees    R Axis 27 degrees    T Axis 48 degrees    QRS Count 10 beats    Q Onset 217 ms    P Onset 136 ms    P Offset 190 ms    T Offset 449 ms    QTC Fredericia 461 ms   Sars-CoV-2 PCR   Result Value Ref Range    Coronavirus 2019, PCR Not Detected Not Detected   Influenza A, and B PCR   Result Value Ref Range    Flu A Result Not Detected Not Detected    Flu B Result Not Detected Not Detected   RSV PCR   Result Value Ref Range    RSV PCR Not Detected Not Detected   CBC and Auto Differential   Result Value Ref Range    WBC 6.8 4.4 - 11.3 x10*3/uL    nRBC 0.0 0.0 - 0.0 /100 WBCs    RBC 3.87 (L) 4.50 - 5.90 x10*6/uL    Hemoglobin 12.4 (L) 13.5 - 17.5 g/dL    Hematocrit 36.4 (L) 41.0 - 52.0 %    MCV 94 80 - 100 fL    MCH  32.0 26.0 - 34.0 pg    MCHC 34.1 32.0 - 36.0 g/dL    RDW 15.0 (H) 11.5 - 14.5 %    Platelets 258 150 - 450 x10*3/uL    Neutrophils % 66.9 40.0 - 80.0 %    Immature Granulocytes %, Automated 0.1 0.0 - 0.9 %    Lymphocytes % 22.3 13.0 - 44.0 %    Monocytes % 8.9 2.0 - 10.0 %    Eosinophils % 1.2 0.0 - 6.0 %    Basophils % 0.6 0.0 - 2.0 %    Neutrophils Absolute 4.53 1.60 - 5.50 x10*3/uL    Immature Granulocytes Absolute, Automated 0.01 0.00 - 0.50 x10*3/uL    Lymphocytes Absolute 1.51 0.80 - 3.00 x10*3/uL    Monocytes Absolute 0.60 0.05 - 0.80 x10*3/uL    Eosinophils Absolute 0.08 0.00 - 0.40 x10*3/uL    Basophils Absolute 0.04 0.00 - 0.10 x10*3/uL   Magnesium   Result Value Ref Range    Magnesium 2.07 1.60 - 2.40 mg/dL   Comprehensive metabolic panel   Result Value Ref Range    Glucose 86 74 - 99 mg/dL    Sodium 137 136 - 145 mmol/L    Potassium 3.7 3.5 - 5.3 mmol/L    Chloride 105 98 - 107 mmol/L    Bicarbonate 27 21 - 32 mmol/L    Anion Gap 9 (L) 10 - 20 mmol/L    Urea Nitrogen 15 6 - 23 mg/dL    Creatinine 0.70 0.50 - 1.30 mg/dL    eGFR >90 >60 mL/min/1.73m*2    Calcium 9.3 8.6 - 10.3 mg/dL    Albumin 3.8 3.4 - 5.0 g/dL    Alkaline Phosphatase 53 33 - 136 U/L    Total Protein 6.1 (L) 6.4 - 8.2 g/dL    AST 14 9 - 39 U/L    Bilirubin, Total 0.5 0.0 - 1.2 mg/dL    ALT 10 10 - 52 U/L   Troponin I, High Sensitivity   Result Value Ref Range    Troponin I, High Sensitivity 7 0 - 20 ng/L   B-Type Natriuretic Peptide   Result Value Ref Range    BNP 32 0 - 99 pg/mL   Lactate   Result Value Ref Range    Lactate 0.7 0.4 - 2.0 mmol/L   Acute Toxicology Panel, Blood   Result Value Ref Range    Acetaminophen <10.0 10.0 - 30.0 ug/mL    Salicylate  <3 4 - 20 mg/dL    Alcohol <10 <=10 mg/dL   Folate   Result Value Ref Range    Folate, Serum >24.0 >5.0 ng/mL   Drug Screen, Urine   Result Value Ref Range    Amphetamine Screen, Urine Presumptive Negative Presumptive Negative    Barbiturate Screen, Urine Presumptive Negative  Presumptive Negative    Benzodiazepines Screen, Urine Presumptive Negative Presumptive Negative    Cannabinoid Screen, Urine Presumptive Negative Presumptive Negative    Cocaine Metabolite Screen, Urine Presumptive Negative Presumptive Negative    Fentanyl Screen, Urine Presumptive Negative Presumptive Negative    Opiate Screen, Urine Presumptive Negative Presumptive Negative    Oxycodone Screen, Urine Presumptive Negative Presumptive Negative    PCP Screen, Urine Presumptive Negative Presumptive Negative   Urinalysis with Reflex Culture and Microscopic   Result Value Ref Range    Color, Urine Klaudia (N) Straw, Yellow    Appearance, Urine Hazy (N) Clear    Specific Gravity, Urine 1.026 1.005 - 1.035    pH, Urine 5.0 5.0, 5.5, 6.0, 6.5, 7.0, 7.5, 8.0    Protein, Urine 30 (1+) (N) NEGATIVE mg/dL    Glucose, Urine NEGATIVE NEGATIVE mg/dL    Blood, Urine NEGATIVE NEGATIVE    Ketones, Urine NEGATIVE NEGATIVE mg/dL    Bilirubin, Urine NEGATIVE NEGATIVE    Urobilinogen, Urine 2.0 (N) <2.0 mg/dL    Nitrite, Urine NEGATIVE NEGATIVE    Leukocyte Esterase, Urine NEGATIVE NEGATIVE   Extra Urine Gray Tube   Result Value Ref Range    Extra Tube Hold for add-ons.    Urinalysis Microscopic   Result Value Ref Range    WBC, Urine NONE 1-5, NONE /HPF    RBC, Urine NONE NONE, 1-2, 3-5 /HPF    Mucus, Urine 2+ Reference range not established. /LPF    Calcium Oxalate Crystals, Urine 2+ (A) NONE, 1+ /HPF   CBC   Result Value Ref Range    WBC 7.9 4.4 - 11.3 x10*3/uL    nRBC 0.0 0.0 - 0.0 /100 WBCs    RBC 3.66 (L) 4.50 - 5.90 x10*6/uL    Hemoglobin 11.7 (L) 13.5 - 17.5 g/dL    Hematocrit 35.3 (L) 41.0 - 52.0 %    MCV 96 80 - 100 fL    MCH 32.0 26.0 - 34.0 pg    MCHC 33.1 32.0 - 36.0 g/dL    RDW 15.1 (H) 11.5 - 14.5 %    Platelets 246 150 - 450 x10*3/uL   Basic Metabolic Panel   Result Value Ref Range    Glucose 80 74 - 99 mg/dL    Sodium 139 136 - 145 mmol/L    Potassium 4.2 3.5 - 5.3 mmol/L    Chloride 108 (H) 98 - 107 mmol/L    Bicarbonate  26 21 - 32 mmol/L    Anion Gap 9 (L) 10 - 20 mmol/L    Urea Nitrogen 19 6 - 23 mg/dL    Creatinine 0.68 0.50 - 1.30 mg/dL    eGFR >90 >60 mL/min/1.73m*2    Calcium 9.0 8.6 - 10.3 mg/dL   Lipid Panel   Result Value Ref Range    Cholesterol 100 0 - 199 mg/dL    HDL-Cholesterol 37.4 mg/dL    Cholesterol/HDL Ratio 2.7     LDL Calculated 50 <=99 mg/dL    VLDL 13 0 - 40 mg/dL    Triglycerides 64 0 - 149 mg/dL    Non HDL Cholesterol 63 0 - 149 mg/dL   Vitamin B12   Result Value Ref Range    Vitamin B12 276 211 - 911 pg/mL   TSH   Result Value Ref Range    Thyroid Stimulating Hormone 2.63 0.44 - 3.98 mIU/L   Hemoglobin A1c   Result Value Ref Range    Hemoglobin A1C 5.3 see below %    Estimated Average Glucose 105 Not Established mg/dL   Transthoracic Echo (TTE) Complete   Result Value Ref Range    AV pk roselyn 1.69 m/s    AV mn grad 6.0 mmHg    LVOT diam 1.80 cm    LV biplane EF 75 %    MV E/A ratio 1.73     LA vol index A/L 25.3 ml/m2    Tricuspid annular plane systolic excursion 2.8 cm    RV free wall pk S' 18.60 cm/s    LVIDd 4.88 cm    RVSP 26.0 mmHg    Aortic Valve Area by Continuity of VTI 2.11 cm2    Aortic Valve Area by Continuity of Peak Velocity 2.03 cm2    AV pk grad 11.4 mmHg    LV A4C EF 76.8    CBC   Result Value Ref Range    WBC 6.9 4.4 - 11.3 x10*3/uL    nRBC 0.0 0.0 - 0.0 /100 WBCs    RBC 3.58 (L) 4.50 - 5.90 x10*6/uL    Hemoglobin 11.3 (L) 13.5 - 17.5 g/dL    Hematocrit 34.3 (L) 41.0 - 52.0 %    MCV 96 80 - 100 fL    MCH 31.6 26.0 - 34.0 pg    MCHC 32.9 32.0 - 36.0 g/dL    RDW 15.0 (H) 11.5 - 14.5 %    Platelets 221 150 - 450 x10*3/uL   Basic Metabolic Panel   Result Value Ref Range    Glucose 78 74 - 99 mg/dL    Sodium 135 (L) 136 - 145 mmol/L    Potassium 3.8 3.5 - 5.3 mmol/L    Chloride 105 98 - 107 mmol/L    Bicarbonate 25 21 - 32 mmol/L    Anion Gap 9 (L) 10 - 20 mmol/L    Urea Nitrogen 16 6 - 23 mg/dL    Creatinine 0.60 0.50 - 1.30 mg/dL    eGFR >90 >60 mL/min/1.73m*2    Calcium 8.6 8.6 - 10.3 mg/dL    CBC   Result Value Ref Range    WBC 8.5 4.4 - 11.3 x10*3/uL    nRBC 0.0 0.0 - 0.0 /100 WBCs    RBC 3.52 (L) 4.50 - 5.90 x10*6/uL    Hemoglobin 11.3 (L) 13.5 - 17.5 g/dL    Hematocrit 33.5 (L) 41.0 - 52.0 %    MCV 95 80 - 100 fL    MCH 32.1 26.0 - 34.0 pg    MCHC 33.7 32.0 - 36.0 g/dL    RDW 15.0 (H) 11.5 - 14.5 %    Platelets 225 150 - 450 x10*3/uL   Basic Metabolic Panel   Result Value Ref Range    Glucose 77 74 - 99 mg/dL    Sodium 136 136 - 145 mmol/L    Potassium 4.2 3.5 - 5.3 mmol/L    Chloride 104 98 - 107 mmol/L    Bicarbonate 28 21 - 32 mmol/L    Anion Gap 8 (L) 10 - 20 mmol/L    Urea Nitrogen 15 6 - 23 mg/dL    Creatinine 0.63 0.50 - 1.30 mg/dL    eGFR >90 >60 mL/min/1.73m*2    Calcium 8.4 (L) 8.6 - 10.3 mg/dL           Assessment/Plan    The patient is stable from a neurological standpoint.  The patient states that he does not wish to have surgery at this time but is certainly open to having an outpatient evaluation.  The patient states that he has rescheduled his PET scan.  I did tell him that if he falls given his history of severe cervical stenosis, he is at risk for quadriplegia.  The patient has a small basilar tip aneurysm and he can follow-up with neurosurgery for this as an outpatient.  The patient should continue all of his medicines and stroke risk factor modification.  I discussed all these issues in detail with the patient and answered all of his questions.  I also discussed the case with Dr. Bean.  I will sign off for now.  The patient can follow-up with me in the office in 4 months as an outpatient.        Perry Baker MD

## 2024-02-25 NOTE — PROGRESS NOTES
02/25/24 1306   Patient Choice   Provider Choice list and CMS website (https://medicare.gov/care-compare#search) for post-acute Quality and Resource Measure Data were provided and reviewed with: Patient  (SNF list provided 2/25)     Received response that [facility] not able to accept. Met with patient at bedside to update. Patient provided preferences of: [facility]. Message sent to [facility] requesting for referrals to be sent.       Addendum 1455: Met with patient at bedside, patient states his facility of preference is [facility] at discharge, patient states his wife is there. Patient updated that referrals had been sent, awaiting response on if able to accept.

## 2024-02-25 NOTE — PROGRESS NOTES
Cardiology Progress Note      Oni Newsome is a 73 y.o. male on day 0 of admission presenting with Dizziness.      Subjective   The patient was seen and examined.  The case was discussed with neurosurgery and neurology.  The patient has only musculoskeletal chest wall tenderness.  Found to have significant C-spine issues.  Also found to have a hilar lymph node.       ROS:  10 systems reviewed other than what is mentioned above.     MEDICATION:    Scheduled medications  aspirin, 81 mg, oral, Nightly  atorvastatin, 80 mg, oral, Nightly  cholecalciferol, 2,000 Units, oral, Daily  clopidogrel, 75 mg, oral, Daily  donepezil, 10 mg, oral, Nightly  enoxaparin, 40 mg, subcutaneous, q24h  escitalopram, 5 mg, oral, Daily  ipratropium-albuteroL, 3 mL, nebulization, TID  lisinopril, 10 mg, oral, Daily  multivitamin with minerals, 1 tablet, oral, Daily  nicotine, 1 patch, transdermal, Daily   Followed by  [START ON 4/5/2024] nicotine, 1 patch, transdermal, Daily   Followed by  [START ON 4/19/2024] nicotine, 1 patch, transdermal, Daily  perflutren lipid microspheres, 0.5-10 mL of dilution, intravenous, Once in imaging  polyethylene glycol, 17 g, oral, Daily  pregabalin, 50 mg, oral, BID  sennosides, 1 tablet, oral, Nightly      Continuous medications     PRN medications  PRN medications: acetaminophen **OR** [DISCONTINUED] acetaminophen **OR** acetaminophen, albuterol, albuterol, guaiFENesin, nicotine polacrilex, oxygen, traMADol     Principal Problem:    Dizziness      OBJECTIVE:    Visit Vitals  /51   Pulse 63   Temp 36.3 °C (97.3 °F)   Resp 18          Intake/Output Summary (Last 24 hours) at 2/24/2024 2358  Last data filed at 2/24/2024 1036  Gross per 24 hour   Intake 355 ml   Output --   Net 355 ml                Patient is alert and oriented x3.  HEENT is unremarkable mucous members are moist  Neck no JVP no bruits upstrokes are full no thyromegaly  Lungs are clear bilaterally.  No wheezing crackles or rales  Heart  regular rhythm normal S1-S2 there is no S3 soft ejection murmur  Abdomen is soft vessels are positive nontender nondistended no organomegaly no pulsatile masses  Extremities have no edema.  Distal pulses present palpable.  Neuro is grossly nonfocal  Skin has no rashes     Image Results  ECG 12 lead  Sinus bradycardia  Cannot rule out Anterior infarct , age undetermined  Abnormal ECG  When compared with ECG of 15-FEB-2024 14:39,  PREVIOUS ECG IS PRESENT  See ED provider note for full interpretation and clinical correlation  Confirmed by Alma Rosa Alvarenga ([id number]) on 2/24/2024 7:24:40 PM        Relevant Results:  RESULTS:    Lab Results   Component Value Date    WBC 6.9 02/24/2024    HGB 11.3 (L) 02/24/2024    HCT 34.3 (L) 02/24/2024    MCV 96 02/24/2024     02/24/2024        Lab Results   Component Value Date    CREATININE 0.60 02/24/2024    BUN 16 02/24/2024     (L) 02/24/2024    K 3.8 02/24/2024     02/24/2024    CO2 25 02/24/2024        Results from last 7 days   Lab Units 02/24/24  0641 02/23/24  0635 02/22/24  1307   PROTEIN TOTAL g/dL  --   --  6.1*   BILIRUBIN TOTAL mg/dL  --   --  0.5   ALK PHOS U/L  --   --  53   ALT U/L  --   --  10   AST U/L  --   --  14   GLUCOSE mg/dL 78 80 86   TSH mIU/L  --  2.63  --    TRIGLYCERIDES mg/dL  --  64  --    HDL mg/dL  --  37.4  --    BNP pg/mL  --   --  32       Assessment/Plan   1.  Chest pain.  Clinically not anginal.  Echo revealed normal LV function.  Biomarkers negative.  EKG without changes.    2.  Hypertension.  Blood pressure is well-controlled.    3.  Hyperlipidemia continue atorvastatin    4.  Preoperative clearance.  At this point, the patient wants to hold off on any neurosurgical intervention.  However in the future he may choose to proceed.  Surgical risk moderately elevated but acceptable.  Okay to hold aspirin for 5 days prior to the surgery.      Raffaele Crenshaw MD

## 2024-02-25 NOTE — CARE PLAN
The patient's goals for the shift include sleep well    The clinical goals for the shift include pt will remain injury free    Over the shift, the patient did not make progress toward the following goals. Barriers to progression include calling for assistance. Recommendations to address these barriers include frequent rounds.

## 2024-02-26 LAB
ANION GAP SERPL CALC-SCNC: 9 MMOL/L (ref 10–20)
APPEARANCE UR: CLEAR
BILIRUB UR STRIP.AUTO-MCNC: NEGATIVE MG/DL
BUN SERPL-MCNC: 18 MG/DL (ref 6–23)
CALCIUM SERPL-MCNC: 8.6 MG/DL (ref 8.6–10.3)
CHLORIDE SERPL-SCNC: 104 MMOL/L (ref 98–107)
CO2 SERPL-SCNC: 27 MMOL/L (ref 21–32)
COLOR UR: YELLOW
CREAT SERPL-MCNC: 0.65 MG/DL (ref 0.5–1.3)
EGFRCR SERPLBLD CKD-EPI 2021: >90 ML/MIN/1.73M*2
ERYTHROCYTE [DISTWIDTH] IN BLOOD BY AUTOMATED COUNT: 14.9 % (ref 11.5–14.5)
GLUCOSE SERPL-MCNC: 78 MG/DL (ref 74–99)
GLUCOSE UR STRIP.AUTO-MCNC: NEGATIVE MG/DL
HCT VFR BLD AUTO: 34 % (ref 41–52)
HGB BLD-MCNC: 11.2 G/DL (ref 13.5–17.5)
KETONES UR STRIP.AUTO-MCNC: NEGATIVE MG/DL
LEUKOCYTE ESTERASE UR QL STRIP.AUTO: NEGATIVE
MCH RBC QN AUTO: 31.4 PG (ref 26–34)
MCHC RBC AUTO-ENTMCNC: 32.9 G/DL (ref 32–36)
MCV RBC AUTO: 95 FL (ref 80–100)
NITRITE UR QL STRIP.AUTO: NEGATIVE
NRBC BLD-RTO: 0 /100 WBCS (ref 0–0)
PH UR STRIP.AUTO: 7 [PH]
PLATELET # BLD AUTO: 239 X10*3/UL (ref 150–450)
POTASSIUM SERPL-SCNC: 4.5 MMOL/L (ref 3.5–5.3)
PROT UR STRIP.AUTO-MCNC: NEGATIVE MG/DL
RBC # BLD AUTO: 3.57 X10*6/UL (ref 4.5–5.9)
RBC # UR STRIP.AUTO: NEGATIVE /UL
SODIUM SERPL-SCNC: 135 MMOL/L (ref 136–145)
SP GR UR STRIP.AUTO: 1.01
UROBILINOGEN UR STRIP.AUTO-MCNC: <2 MG/DL
WBC # BLD AUTO: 7.1 X10*3/UL (ref 4.4–11.3)

## 2024-02-26 PROCEDURE — 2500000002 HC RX 250 W HCPCS SELF ADMINISTERED DRUGS (ALT 637 FOR MEDICARE OP, ALT 636 FOR OP/ED): Performed by: NURSE PRACTITIONER

## 2024-02-26 PROCEDURE — S4991 NICOTINE PATCH NONLEGEND: HCPCS | Performed by: NURSE PRACTITIONER

## 2024-02-26 PROCEDURE — 2500000001 HC RX 250 WO HCPCS SELF ADMINISTERED DRUGS (ALT 637 FOR MEDICARE OP): Performed by: INTERNAL MEDICINE

## 2024-02-26 PROCEDURE — 97116 GAIT TRAINING THERAPY: CPT | Mod: CQ,GP

## 2024-02-26 PROCEDURE — 97535 SELF CARE MNGMENT TRAINING: CPT | Mod: CQ,GP

## 2024-02-26 PROCEDURE — 85027 COMPLETE CBC AUTOMATED: CPT | Performed by: INTERNAL MEDICINE

## 2024-02-26 PROCEDURE — 2500000001 HC RX 250 WO HCPCS SELF ADMINISTERED DRUGS (ALT 637 FOR MEDICARE OP)

## 2024-02-26 PROCEDURE — 99239 HOSP IP/OBS DSCHRG MGMT >30: CPT | Performed by: INTERNAL MEDICINE

## 2024-02-26 PROCEDURE — 2500000002 HC RX 250 W HCPCS SELF ADMINISTERED DRUGS (ALT 637 FOR MEDICARE OP, ALT 636 FOR OP/ED): Performed by: INTERNAL MEDICINE

## 2024-02-26 PROCEDURE — 2500000001 HC RX 250 WO HCPCS SELF ADMINISTERED DRUGS (ALT 637 FOR MEDICARE OP): Performed by: NURSE PRACTITIONER

## 2024-02-26 PROCEDURE — G0378 HOSPITAL OBSERVATION PER HR: HCPCS

## 2024-02-26 PROCEDURE — 80048 BASIC METABOLIC PNL TOTAL CA: CPT | Performed by: INTERNAL MEDICINE

## 2024-02-26 PROCEDURE — 2500000004 HC RX 250 GENERAL PHARMACY W/ HCPCS (ALT 636 FOR OP/ED): Performed by: NURSE PRACTITIONER

## 2024-02-26 PROCEDURE — 94640 AIRWAY INHALATION TREATMENT: CPT

## 2024-02-26 PROCEDURE — 36415 COLL VENOUS BLD VENIPUNCTURE: CPT | Performed by: INTERNAL MEDICINE

## 2024-02-26 PROCEDURE — 81003 URINALYSIS AUTO W/O SCOPE: CPT | Performed by: INTERNAL MEDICINE

## 2024-02-26 RX ORDER — IBUPROFEN 200 MG
1 TABLET ORAL DAILY
Qty: 30 PATCH | Refills: 1
Start: 2024-02-27 | End: 2024-04-05

## 2024-02-26 RX ORDER — POLYETHYLENE GLYCOL 3350 17 G/17G
17 POWDER, FOR SOLUTION ORAL DAILY PRN
Qty: 30 PACKET | Refills: 0 | Status: SHIPPED | OUTPATIENT
Start: 2024-02-26

## 2024-02-26 RX ORDER — IPRATROPIUM BROMIDE AND ALBUTEROL SULFATE 2.5; .5 MG/3ML; MG/3ML
3 SOLUTION RESPIRATORY (INHALATION) EVERY 2 HOUR PRN
Status: DISCONTINUED | OUTPATIENT
Start: 2024-02-26 | End: 2024-02-28 | Stop reason: HOSPADM

## 2024-02-26 RX ORDER — ALUMINUM HYDROXIDE, MAGNESIUM HYDROXIDE, AND SIMETHICONE 1200; 120; 1200 MG/30ML; MG/30ML; MG/30ML
30 SUSPENSION ORAL 4 TIMES DAILY PRN
Status: DISCONTINUED | OUTPATIENT
Start: 2024-02-26 | End: 2024-02-28 | Stop reason: HOSPADM

## 2024-02-26 RX ORDER — ESCITALOPRAM OXALATE 5 MG/1
5 TABLET ORAL DAILY
Qty: 30 TABLET | Refills: 0 | Status: SHIPPED | OUTPATIENT
Start: 2024-02-26

## 2024-02-26 RX ORDER — GUAIFENESIN 600 MG/1
600 TABLET, EXTENDED RELEASE ORAL EVERY 12 HOURS PRN
Qty: 28 TABLET | Refills: 0 | Status: SHIPPED | OUTPATIENT
Start: 2024-02-26 | End: 2024-03-11

## 2024-02-26 RX ORDER — ALBUTEROL SULFATE 0.83 MG/ML
2.5 SOLUTION RESPIRATORY (INHALATION) EVERY 2 HOUR PRN
Status: DISCONTINUED | OUTPATIENT
Start: 2024-02-26 | End: 2024-02-28 | Stop reason: HOSPADM

## 2024-02-26 RX ORDER — MICONAZOLE NITRATE 2 %
2 CREAM (GRAM) TOPICAL EVERY 2 HOUR PRN
Qty: 100 EACH | Refills: 0
Start: 2024-02-26

## 2024-02-26 RX ADMIN — PREGABALIN 50 MG: 50 CAPSULE ORAL at 21:06

## 2024-02-26 RX ADMIN — PREGABALIN 50 MG: 50 CAPSULE ORAL at 10:21

## 2024-02-26 RX ADMIN — ESCITALOPRAM OXALATE 5 MG: 10 TABLET ORAL at 10:20

## 2024-02-26 RX ADMIN — ACETAMINOPHEN 650 MG: 325 TABLET ORAL at 16:31

## 2024-02-26 RX ADMIN — ALUMINUM HYDROXIDE, MAGNESIUM HYDROXIDE, AND DIMETHICONE 30 ML: 200; 20; 200 SUSPENSION ORAL at 21:06

## 2024-02-26 RX ADMIN — SENNOSIDES 8.6 MG: 8.6 TABLET, FILM COATED ORAL at 21:06

## 2024-02-26 RX ADMIN — IPRATROPIUM BROMIDE AND ALBUTEROL SULFATE 3 ML: .5; 3 SOLUTION RESPIRATORY (INHALATION) at 06:52

## 2024-02-26 RX ADMIN — NICOTINE 1 PATCH: 21 PATCH, EXTENDED RELEASE TRANSDERMAL at 10:20

## 2024-02-26 RX ADMIN — CLOPIDOGREL 75 MG: 75 TABLET ORAL at 10:20

## 2024-02-26 RX ADMIN — Medication 2000 UNITS: at 10:20

## 2024-02-26 RX ADMIN — POLYETHYLENE GLYCOL 3350 17 G: 17 POWDER, FOR SOLUTION ORAL at 10:20

## 2024-02-26 RX ADMIN — Medication 1 TABLET: at 10:20

## 2024-02-26 RX ADMIN — ACETAMINOPHEN 650 MG: 325 TABLET ORAL at 10:21

## 2024-02-26 RX ADMIN — DONEPEZIL HYDROCHLORIDE 10 MG: 10 TABLET ORAL at 21:06

## 2024-02-26 RX ADMIN — ENOXAPARIN SODIUM 40 MG: 40 INJECTION SUBCUTANEOUS at 10:21

## 2024-02-26 RX ADMIN — ASPIRIN 81 MG 81 MG: 81 TABLET ORAL at 21:06

## 2024-02-26 RX ADMIN — ATORVASTATIN CALCIUM 80 MG: 80 TABLET, FILM COATED ORAL at 21:06

## 2024-02-26 RX ADMIN — TRAMADOL HYDROCHLORIDE 25 MG: 50 TABLET ORAL at 10:25

## 2024-02-26 ASSESSMENT — COGNITIVE AND FUNCTIONAL STATUS - GENERAL
MOVING TO AND FROM BED TO CHAIR: A LITTLE
DAILY ACTIVITIY SCORE: 21
WALKING IN HOSPITAL ROOM: A LITTLE
TURNING FROM BACK TO SIDE WHILE IN FLAT BAD: A LITTLE
WALKING IN HOSPITAL ROOM: A LITTLE
MOVING TO AND FROM BED TO CHAIR: A LITTLE
DRESSING REGULAR LOWER BODY CLOTHING: A LITTLE
MOBILITY SCORE: 16
DRESSING REGULAR UPPER BODY CLOTHING: A LITTLE
STANDING UP FROM CHAIR USING ARMS: A LITTLE
HELP NEEDED FOR BATHING: A LITTLE
MOBILITY SCORE: 16
CLIMB 3 TO 5 STEPS WITH RAILING: TOTAL
CLIMB 3 TO 5 STEPS WITH RAILING: TOTAL
MOVING FROM LYING ON BACK TO SITTING ON SIDE OF FLAT BED WITH BEDRAILS: A LITTLE
MOVING FROM LYING ON BACK TO SITTING ON SIDE OF FLAT BED WITH BEDRAILS: A LITTLE
STANDING UP FROM CHAIR USING ARMS: A LITTLE
TURNING FROM BACK TO SIDE WHILE IN FLAT BAD: A LITTLE

## 2024-02-26 ASSESSMENT — PAIN DESCRIPTION - LOCATION: LOCATION: BACK

## 2024-02-26 ASSESSMENT — PAIN DESCRIPTION - DESCRIPTORS
DESCRIPTORS: ACHING

## 2024-02-26 ASSESSMENT — PAIN SCALES - GENERAL
PAINLEVEL_OUTOF10: 3
PAINLEVEL_OUTOF10: 3
PAINLEVEL_OUTOF10: 0 - NO PAIN
PAINLEVEL_OUTOF10: 8

## 2024-02-26 ASSESSMENT — PAIN - FUNCTIONAL ASSESSMENT
PAIN_FUNCTIONAL_ASSESSMENT: 0-10

## 2024-02-26 NOTE — PROGRESS NOTES
Oni Newsome is a 73 y.o. male on day 0 of admission presenting with Dizziness.      Subjective   No events overnight.  Patient seen and examined at bedside.  He states that his PET scan has been rescheduled now.  Discussed plan to still discharge to SNF for outpatient PET scan and neurosurgery consultation.       Objective     Last Recorded Vitals  /61 (Patient Position: Sitting)   Pulse 56   Temp 36.4 °C (97.5 °F)   Resp 16   Wt 63.5 kg (139 lb 15.9 oz)   SpO2 95%   Intake/Output last 3 Shifts:  No intake or output data in the 24 hours ending 02/25/24 1925      Admission Weight  Weight: 63.5 kg (140 lb) (02/22/24 1234)    Daily Weight  02/23/24 : 63.5 kg (139 lb 15.9 oz)    Image Results  ECG 12 lead  Sinus bradycardia  Cannot rule out Anterior infarct , age undetermined  Abnormal ECG  When compared with ECG of 15-FEB-2024 14:39,  PREVIOUS ECG IS PRESENT  See ED provider note for full interpretation and clinical correlation  Confirmed by Alma Rosa Alvarenga ([id number]) on 2/24/2024 7:24:40 PM      Physical Exam  Constitutional:       General: He is awake. He is not in acute distress.     Appearance: Normal appearance. He is not toxic-appearing.   HENT:      Head: Atraumatic.      Nose: Nose normal.      Mouth/Throat:      Mouth: Mucous membranes are moist.   Eyes:      Extraocular Movements: Extraocular movements intact.      Conjunctiva/sclera: Conjunctivae normal.      Pupils: Pupils are equal, round, and reactive to light.   Cardiovascular:      Rate and Rhythm: Normal rate and regular rhythm.      Pulses: Normal pulses.      Heart sounds: No murmur heard.  Pulmonary:      Effort: Pulmonary effort is normal.      Breath sounds: Wheezing and rhonchi present.   Abdominal:      General: Bowel sounds are normal. There is no distension.      Palpations: Abdomen is soft.      Tenderness: There is no abdominal tenderness. There is no guarding.   Musculoskeletal:         General: No swelling, deformity or signs of  injury. Normal range of motion.      Cervical back: Neck supple.      Right lower leg: No edema.      Left lower leg: No edema.   Skin:     General: Skin is warm and dry.      Capillary Refill: Capillary refill takes less than 2 seconds.      Findings: No ecchymosis, erythema or wound.   Neurological:      General: No focal deficit present.      Mental Status: He is alert and oriented to person, place, and time.   Psychiatric:         Mood and Affect: Affect is flat.         Speech: Speech is delayed.         Behavior: Behavior is cooperative.         Thought Content: Thought content normal.     Relevant Results               Assessment/Plan        Principal Problem:    Dizziness    73-year-old male with past medical history of Alzheimer's dementia, hypertension, hyperlipidemia, COPD, nicotine use disorder, AAA, PAD s/p angioplasty LLE and left great toe amputation, BPH, macular degeneration, and cataracts.  Patient presents to the hospital with multiple complaints most notably for dizziness, chest pain, generalized weakness/fatigue, and difficulty with ADLs at home.     #Dizziness  #Tremor, left upper extremity  #Weakness, generalized     Telemetry monitoring  Neurology consult for input  MRI of the brain, MRA of the head and neck  Echocardiogram  Orthostatic vitals  PT/OT with [facility]  Social work for discharge planning, patient concerned he can no longer care for himself     #Chest pain     Cardiology consult, appreciate input  Reports left chest discomfort, sounds pleuritic in nature  Initial ischemic workup in the ED negative.  Trend troponins.  Echocardiogram  Lipid panel in a.m.  Aspirin 81 mg daily     #COPD  #Nicotine use disorder     Patient is wheezing/coughing on exam  Nebulizers as needed and scheduled  If not improved may benefit from course of steroids  Currently on room air, supplemental oxygen as needed  RT consult  Nicotine patch and gum while hospitalized  Mucinex twice daily     #Constipation      Last bowel movement 3 days ago, he does report black stools, however low suspicion for GI bleed  MiraLAX daily  Monitor     #Enlarged left hilar node  #Lung nodules     OP follow up, already referred to lung nodule clinic     Chronic conditions:  #PAD s/p left lower extremity angioplasty  #History AAA  #Hypertension  # Hyperlipidemia  #BPH  #Macular degeneration  #Cataracts     Continue with patient's home medications once entered by nursing/pharmacy     #DVT prophylaxis  SCDs  Lovenox subcutaneous     2/24: MRIs show critical stenosis with nerve root and cord compression in the cervical and lumbar spine.  Neurosurgery recommending decompressive laminectomy with fusion.  Patient cleared from a cardiac perspective.  He does have a pending outpatient PET scan for a hilar node on CT chest.  Tentative plan is to discharge to SNF at the beginning of next week to maintain outpatient PET scan appointment and have close follow-up with neurosurgery to discuss potential spinal surgery.    2/25: Patient is medically ready for discharge to SNF.  He will have an outpatient PET scan and neurosurgery consultation.      Malnutrition Diagnosis Status: New  Malnutrition Diagnosis: Moderate malnutrition related to chronic disease or condition  As Evidenced by: moderate muscle and fat loss noted on physical exam.  < 75% energy intake compared to estimated needs for > 1 month.  Pt is 84% of his IBW,  has dementia and is living by himself up until now  I agree with the dietitian's malnutrition diagnosis.         Michael Bean, DO

## 2024-02-26 NOTE — CARE PLAN
The patient's goals for the shift include sleep well    The clinical goals for the shift include Patient will remain safe and free from iinjury for entire shift.    Over the shift, the patient did not make progress toward the following goals. Barriers to progression include acute illness. Recommendations to address these barriers include communication.

## 2024-02-26 NOTE — PROGRESS NOTES
Physical Therapy    Physical Therapy Treatment    Patient Name: Oni Newsome  MRN: [id number]  Today's Date: 2/26/2024  Time Calculation  Start Time: 1131  Stop Time: 1201  Time Calculation (min): 30 min       Assessment/Plan         PT Plan  Treatment/Interventions: Bed mobility, Transfer training, Gait training  PT Plan: Skilled PT  PT Frequency: 3 times per week  PT Discharge Recommendations: Moderate intensity level of continued care  PT - OK to Discharge: Yes (once cleared by medical team)      General Visit Information:   PT  Visit  PT Received On: 02/26/24       Subjective               Objective                    Treatments:  Therapeutic Exercise  Therapeutic Exercise Performed:  (sitting ble arom exs x 15 reps)    Bed Mobility  Bed Mobility:  (supine to sit and sit to supine sba)    Ambulation/Gait Training  Ambulation/Gait Training Performed:  (ambulated 50 feet x 2 using fixed wheeled walker min a x 1)  Transfers  Transfer:  (sit to stand cga)    Outcome Measures:  [facility] Basic Mobility  Turning from your back to your side while in a flat bed without using bedrails: A little  Moving from lying on your back to sitting on the side of a flat bed without using bedrails: A little  Moving to and from bed to chair (including a wheelchair): A little  Standing up from a chair using your arms (e.g. wheelchair or bedside chair): A little  To walk in hospital room: A little  Climbing 3-5 steps with railing: Total  Basic Mobility - Total Score: 16    Education Documentation  Precautions, taught by Alma Rosa Mcduffie PTA at 2/26/2024 12:54 PM.  Learner: Patient  Readiness: Acceptance  Method: Demonstration  Response: Demonstrated Understanding    Mobility Training, taught by Alma Rosa Mcduffie PTA at 2/26/2024 12:54 PM.  Learner: Patient  Readiness: Acceptance  Method: Demonstration  Response: Demonstrated Understanding    ADL Training, taught by Alma Rosa Mcduffie PTA at 2/26/2024 12:54 PM.  Learner: Patient  Readiness:  Acceptance  Method: Demonstration  Response: Demonstrated Understanding    Education Comments  No comments found.             Encounter Problems       Encounter Problems (Active)       PT Problem       STG - Pt will transition supine <> sitting with SBA  (Progressing)       Start:  02/23/24    Expected End:  03/08/24            STG - Pt will transfer STS with SBA  (Progressing)       Start:  02/23/24    Expected End:  03/08/24            STG - Pt will amb 30' using RW with CGA  (Progressing)       Start:  02/23/24    Expected End:  03/08/24               Pain - Adult          Safety       LTG - Patient will demonstrate safety requirements appropriate to situation/environment       Start:  02/22/24

## 2024-02-26 NOTE — CARE PLAN
The patient's goals for the shift include comfort and sleep.    The clinical goals for the shift include patient will remain safe and free from injury for entire shift.

## 2024-02-27 VITALS
OXYGEN SATURATION: 95 % | HEIGHT: 68 IN | BODY MASS INDEX: 21.22 KG/M2 | SYSTOLIC BLOOD PRESSURE: 115 MMHG | DIASTOLIC BLOOD PRESSURE: 57 MMHG | RESPIRATION RATE: 17 BRPM | TEMPERATURE: 96.8 F | HEART RATE: 55 BPM | WEIGHT: 139.99 LBS

## 2024-02-27 LAB — METHYLMALONATE SERPL-SCNC: 0.14 UMOL/L (ref 0–0.4)

## 2024-02-27 PROCEDURE — 99232 SBSQ HOSP IP/OBS MODERATE 35: CPT | Performed by: INTERNAL MEDICINE

## 2024-02-27 PROCEDURE — 97535 SELF CARE MNGMENT TRAINING: CPT | Mod: GO

## 2024-02-27 PROCEDURE — 97110 THERAPEUTIC EXERCISES: CPT | Mod: GP,CQ

## 2024-02-27 PROCEDURE — 2500000004 HC RX 250 GENERAL PHARMACY W/ HCPCS (ALT 636 FOR OP/ED): Performed by: NURSE PRACTITIONER

## 2024-02-27 PROCEDURE — 2500000001 HC RX 250 WO HCPCS SELF ADMINISTERED DRUGS (ALT 637 FOR MEDICARE OP): Performed by: NURSE PRACTITIONER

## 2024-02-27 PROCEDURE — G0378 HOSPITAL OBSERVATION PER HR: HCPCS

## 2024-02-27 PROCEDURE — 2500000002 HC RX 250 W HCPCS SELF ADMINISTERED DRUGS (ALT 637 FOR MEDICARE OP, ALT 636 FOR OP/ED): Performed by: NURSE PRACTITIONER

## 2024-02-27 PROCEDURE — 97116 GAIT TRAINING THERAPY: CPT | Mod: GP,CQ

## 2024-02-27 PROCEDURE — 2500000001 HC RX 250 WO HCPCS SELF ADMINISTERED DRUGS (ALT 637 FOR MEDICARE OP): Performed by: INTERNAL MEDICINE

## 2024-02-27 PROCEDURE — 2500000001 HC RX 250 WO HCPCS SELF ADMINISTERED DRUGS (ALT 637 FOR MEDICARE OP)

## 2024-02-27 PROCEDURE — S4991 NICOTINE PATCH NONLEGEND: HCPCS | Performed by: NURSE PRACTITIONER

## 2024-02-27 RX ORDER — ALUMINUM HYDROXIDE, MAGNESIUM HYDROXIDE, AND SIMETHICONE 1200; 120; 1200 MG/30ML; MG/30ML; MG/30ML
30 SUSPENSION ORAL 4 TIMES DAILY PRN
Qty: 355 ML | Refills: 0
Start: 2024-02-27

## 2024-02-27 RX ADMIN — ENOXAPARIN SODIUM 40 MG: 40 INJECTION SUBCUTANEOUS at 09:27

## 2024-02-27 RX ADMIN — SENNOSIDES 8.6 MG: 8.6 TABLET, FILM COATED ORAL at 20:03

## 2024-02-27 RX ADMIN — ATORVASTATIN CALCIUM 80 MG: 80 TABLET, FILM COATED ORAL at 20:03

## 2024-02-27 RX ADMIN — PREGABALIN 50 MG: 50 CAPSULE ORAL at 09:27

## 2024-02-27 RX ADMIN — ASPIRIN 81 MG 81 MG: 81 TABLET ORAL at 20:04

## 2024-02-27 RX ADMIN — DONEPEZIL HYDROCHLORIDE 10 MG: 10 TABLET ORAL at 20:04

## 2024-02-27 RX ADMIN — NICOTINE 1 PATCH: 21 PATCH, EXTENDED RELEASE TRANSDERMAL at 09:27

## 2024-02-27 RX ADMIN — PREGABALIN 50 MG: 50 CAPSULE ORAL at 20:03

## 2024-02-27 RX ADMIN — TRAMADOL HYDROCHLORIDE 25 MG: 50 TABLET ORAL at 14:36

## 2024-02-27 RX ADMIN — Medication 1 TABLET: at 09:27

## 2024-02-27 RX ADMIN — ESCITALOPRAM OXALATE 5 MG: 10 TABLET ORAL at 09:26

## 2024-02-27 RX ADMIN — CLOPIDOGREL 75 MG: 75 TABLET ORAL at 09:27

## 2024-02-27 RX ADMIN — POLYETHYLENE GLYCOL 3350 17 G: 17 POWDER, FOR SOLUTION ORAL at 09:27

## 2024-02-27 RX ADMIN — Medication 2000 UNITS: at 09:26

## 2024-02-27 ASSESSMENT — PAIN - FUNCTIONAL ASSESSMENT
PAIN_FUNCTIONAL_ASSESSMENT: 0-10

## 2024-02-27 ASSESSMENT — COGNITIVE AND FUNCTIONAL STATUS - GENERAL
MOVING FROM LYING ON BACK TO SITTING ON SIDE OF FLAT BED WITH BEDRAILS: A LITTLE
DRESSING REGULAR UPPER BODY CLOTHING: A LITTLE
DAILY ACTIVITIY SCORE: 20
MOBILITY SCORE: 16
WALKING IN HOSPITAL ROOM: A LITTLE
DRESSING REGULAR UPPER BODY CLOTHING: A LITTLE
HELP NEEDED FOR BATHING: A LITTLE
MOVING TO AND FROM BED TO CHAIR: A LITTLE
HELP NEEDED FOR BATHING: A LITTLE
WALKING IN HOSPITAL ROOM: A LITTLE
DRESSING REGULAR LOWER BODY CLOTHING: A LITTLE
TOILETING: A LITTLE
STANDING UP FROM CHAIR USING ARMS: A LITTLE
MOVING TO AND FROM BED TO CHAIR: A LITTLE
CLIMB 3 TO 5 STEPS WITH RAILING: TOTAL
TURNING FROM BACK TO SIDE WHILE IN FLAT BAD: A LITTLE
CLIMB 3 TO 5 STEPS WITH RAILING: TOTAL
DAILY ACTIVITIY SCORE: 21
DRESSING REGULAR LOWER BODY CLOTHING: A LITTLE
STANDING UP FROM CHAIR USING ARMS: A LITTLE
MOBILITY SCORE: 16
MOVING FROM LYING ON BACK TO SITTING ON SIDE OF FLAT BED WITH BEDRAILS: A LITTLE
TURNING FROM BACK TO SIDE WHILE IN FLAT BAD: A LITTLE

## 2024-02-27 ASSESSMENT — PAIN SCALES - GENERAL
PAINLEVEL_OUTOF10: 3
PAINLEVEL_OUTOF10: 10 - WORST POSSIBLE PAIN
PAINLEVEL_OUTOF10: 3
PAINLEVEL_OUTOF10: 8
PAINLEVEL_OUTOF10: 10 - WORST POSSIBLE PAIN
PAINLEVEL_OUTOF10: 0 - NO PAIN

## 2024-02-27 ASSESSMENT — PAIN DESCRIPTION - DESCRIPTORS
DESCRIPTORS: ACHING
DESCRIPTORS: ACHING

## 2024-02-27 ASSESSMENT — ACTIVITIES OF DAILY LIVING (ADL): HOME_MANAGEMENT_TIME_ENTRY: 15

## 2024-02-27 NOTE — CARE PLAN
The patient's goals for the shift include sleep well    The clinical goals for the shift include Maintain safety.    Over the shift, the patient is making progress toward the following goals. Barriers to progression include cognitive decline/forgetfulness, vision limitations (M.D/cataract), and age. Recommendations to address these barriers include ongoing therapies - PT/OT and safety measures.

## 2024-02-27 NOTE — PROGRESS NOTES
Physical Therapy                 Therapy Communication Note    Patient Name: Oni Newsome  MRN: [id number]  Today's Date: 2/27/2024     Discipline: Physical Therapy    Comment: Updated pt's POC from 3x/week to 4x/week to continue improving pt's function and update pt's status for precert.

## 2024-02-27 NOTE — PROGRESS NOTES
Occupational Therapy    OT Treatment    Patient Name: Oni Newsome  MRN: [id number]  Today's Date: 2/27/2024  Time Calculation  Start Time: 1425  Stop Time: 1440  Time Calculation (min): 15 min         Assessment:  End of Session Patient Position: Up in chair, Alarm off, not on at start of session (call light in reach)     Plan:  Treatment Interventions: ADL retraining, Functional transfer training, UE strengthening/ROM, Endurance training, Cognitive reorientation, Neuromuscular reeducation  OT Frequency: 3 times per week  OT Discharge Recommendations: Moderate intensity level of continued care, 24 hr supervision due to cognition  OT - OK to Discharge: Yes (from an O.T. standpoint)      Subjective   Previous Visit Info:  OT Last Visit  OT Received On: 02/27/24     Pain:  Pain Assessment  Pain Assessment: 0-10  Pain Score: 10 - Worst possible pain  Pain Location: Back  Pain Interventions:  (nursing in room to medicate patient for pain)    Objective    Cognition:  Cognition  Overall Cognitive Status:  (min cues for safety and hand placement; slow processing at times; impaired insight into deficits.)     Activities of Daily Living: LE Bathing  LE Bathing Level of Assistance:  (SBA at chair level)  LE Dressing  LE Dressing:  (SBA at chair level to don socks and pants; min difficulty due to back pain; educated re:use of adaptive equipment, however patient declines, stating he won't need to use adaptive equipment.)  Toileting  Toileting Level of Assistance:  (SBA to/from commode with grab bar.)     Bed Mobility/Transfers: Transfers  Transfer:  (SBA to/from recliner and commode, unsteady at times, patient declines use of walker for balance.)     Ambulation/Gait Training: CGA/SBA without device, declines walker for safety.     Outcome Measures:[facility] Daily Activity  Putting on and taking off regular lower body clothing: A little  Bathing (including washing, rinsing, drying): A little  Putting on and taking off regular upper  body clothing: A little  Toileting, which includes using toilet, bedpan or urinal: A little  Taking care of personal grooming such as brushing teeth: None  Eating Meals: None  Daily Activity - Total Score: 20        Education Documentation  ADL Training, taught by Candice Perea OT at 2/27/2024  2:57 PM.  Learner: Patient  Readiness: Acceptance  Method: Explanation  Response: Needs Reinforcement    Education Comments  No comments found.           Goals:  Encounter Problems       Encounter Problems (Active)       OT Goals       Increase LE dressing to supervision with adaptive equipment prn (Progressing)       Start:  02/23/24    Expected End:  03/02/24            Increase dynamic sit and stand balance to supervision with UE support to promote increased safety with ADLs (Progressing)       Start:  02/23/24    Expected End:  03/02/24            Increase functional transfers to/from bed, chair & commode to supervision with DME for safety  (Progressing)       Start:  02/23/24    Expected End:  03/02/24            Demonstrate compliance to safety techs during ADLs with min cues (Progressing)       Start:  02/23/24    Expected End:  03/02/24               Safety       LTG - Patient will demonstrate safety requirements appropriate to situation/environment       Start:  02/22/24    Expected End:  03/02/24

## 2024-02-27 NOTE — DISCHARGE SUMMARY
Discharge Diagnosis  Dizziness    Issues Requiring Follow-Up  Spinal stenosis, cord compression, hilar adenopathy     Discharge Meds     Your medication list        START taking these medications        Instructions Last Dose Given Next Dose Due   escitalopram 5 mg tablet  Commonly known as: Lexapro      Take 1 tablet (5 mg) by mouth once daily.       guaiFENesin 600 mg 12 hr tablet  Commonly known as: Mucinex      Take 1 tablet (600 mg) by mouth every 12 hours if needed for congestion for up to 14 days. Do not crush, chew, or split.       nicotine 21 mg/24 hr patch  Commonly known as: Nicoderm CQ  Start taking on: February 27, 2024      Place 1 patch over 24 hours on the skin once daily for 38 doses. Do not start before February 27, 2024.       nicotine polacrilex 2 mg gum  Commonly known as: Nicorette      Chew 1 each (2 mg) every 2 hours if needed for smoking cessation (nicotine craving, urge to smoke).       polyethylene glycol 17 gram packet  Commonly known as: Glycolax, Miralax      Take 17 g by mouth once daily as needed (CONSTIPATION).              CONTINUE taking these medications        Instructions Last Dose Given Next Dose Due   albuterol 90 mcg/actuation inhaler           aspirin 81 mg chewable tablet           atorvastatin 80 mg tablet  Commonly known as: Lipitor           cholecalciferol 50 MCG (2000 UT) tablet  Commonly known as: Vitamin D-3           clopidogrel 75 mg tablet  Commonly known as: Plavix           donepezil 10 mg tablet  Commonly known as: Aricept           lisinopril 10 mg tablet           multivitamin with minerals tablet           pregabalin 50 mg capsule  Commonly known as: Lyrica           sennosides 8.6 mg tablet  Commonly known as: Senokot                  STOP taking these medications      acetaminophen 325 mg tablet  Commonly known as: Tylenol        ibuprofen 600 mg tablet        UNABLE TO FIND                  Where to Get Your Medications        These medications were sent  to [facility] - [place], [place] - [address]  [address] [facility] [address]      Phone: [phone]   escitalopram 5 mg tablet  guaiFENesin 600 mg 12 hr tablet  polyethylene glycol 17 gram packet       Information about where to get these medications is not yet available    Ask your nurse or doctor about these medications  nicotine 21 mg/24 hr patch  nicotine polacrilex 2 mg gum         Test Results Pending At Discharge  Pending Labs       Order Current Status    Methylmalonic acid, serum In process            Hospital Course   73-year-old male with past medical history of Alzheimer's dementia, hypertension, hyperlipidemia, COPD, nicotine use disorder, AAA, PAD s/p angioplasty LLE and left great toe amputation, BPH, macular degeneration, and cataracts.  Patient presents to the hospital with multiple complaints most notably for dizziness, chest pain, generalized weakness/fatigue, and difficulty with ADLs at home.     #Dizziness  #Tremor, left upper extremity  #Weakness, generalized     Telemetry monitoring  Neurology consult for input  MRI of the brain, MRA of the head and neck  Echocardiogram  Orthostatic vitals  PT/OT with [facility]  Social work for discharge planning, patient concerned he can no longer care for himself     #Chest pain     Cardiology consult, appreciate input  Reports left chest discomfort, sounds pleuritic in nature  Initial ischemic workup in the ED negative.  Trend troponins.  Echocardiogram  Lipid panel in a.m.  Aspirin 81 mg daily     #COPD  #Nicotine use disorder     Patient is wheezing/coughing on exam  Nebulizers as needed and scheduled  If not improved may benefit from course of steroids  Currently on room air, supplemental oxygen as needed  RT consult  Nicotine patch and gum while hospitalized  Mucinex twice daily     #Constipation     Last bowel movement 3 days ago, he does report black stools, however low suspicion for GI bleed  MiraLAX daily  Monitor     #Enlarged  left hilar node  #Lung nodules     OP follow up, already referred to lung nodule clinic     Chronic conditions:  #PAD s/p left lower extremity angioplasty  #History AAA  #Hypertension  # Hyperlipidemia  #BPH  #Macular degeneration  #Cataracts     Continue with patient's home medications once entered by nursing/pharmacy     #DVT prophylaxis  SCDs  Lovenox subcutaneous     2/24: MRIs show critical stenosis with nerve root and cord compression in the cervical and lumbar spine.  Neurosurgery recommending decompressive laminectomy with fusion.  Patient cleared from a cardiac perspective.  He does have a pending outpatient PET scan for a hilar node on CT chest.  Tentative plan is to discharge to SNF at the beginning of next week to maintain outpatient PET scan appointment and have close follow-up with neurosurgery to discuss potential spinal surgery.     2/25: Patient is medically ready for discharge to SNF.  He will have an outpatient PET scan and neurosurgery consultation.    Pertinent Physical Exam At Time of Discharge  Physical Exam  Constitutional:       General: He is awake. He is not in acute distress.     Appearance: Normal appearance. He is not toxic-appearing.   HENT:      Head: Atraumatic.      Nose: Nose normal.      Mouth/Throat:      Mouth: Mucous membranes are moist.   Eyes:      Extraocular Movements: Extraocular movements intact.      Conjunctiva/sclera: Conjunctivae normal.      Pupils: Pupils are equal, round, and reactive to light.   Cardiovascular:      Rate and Rhythm: Normal rate and regular rhythm.      Pulses: Normal pulses.      Heart sounds: No murmur heard.  Pulmonary:      Effort: Pulmonary effort is normal.      Breath sounds: Wheezing and rhonchi present.   Abdominal:      General: Bowel sounds are normal. There is no distension.      Palpations: Abdomen is soft.      Tenderness: There is no abdominal tenderness. There is no guarding.   Musculoskeletal:         General: No swelling,  deformity or signs of injury. Normal range of motion.      Cervical back: Neck supple.      Right lower leg: No edema.      Left lower leg: No edema.   Skin:     General: Skin is warm and dry.      Capillary Refill: Capillary refill takes less than 2 seconds.      Findings: No ecchymosis, erythema or wound.   Neurological:      General: No focal deficit present.      Mental Status: He is alert and oriented to person, place, and time.   Psychiatric:         Mood and Affect: Affect is flat.         Speech: Speech is delayed.         Behavior: Behavior is cooperative.         Thought Content: Thought content normal.      Outpatient Follow-Up  Future Appointments   Date Time Provider Department Center   3/4/2024 11:00 AM PAR [facility] MOBILE ADMIN ROOM PET CT PARPETCT[facility]B [facility] PET   3/4/2024 12:00 PM [facility] MOBILE PET CT [facility] PET   3/5/2024  9:00 AM BHAVNA Rice-CNP [id number] [facility]   4/10/2024 11:00 AM Aditay Carias MD [id number] [facility]         Michael Bean DO

## 2024-02-27 NOTE — PROGRESS NOTES
Cardiology Progress Note      Oni Newsome is a 73 y.o. male on day 0 of admission presenting with Dizziness.      Subjective   Feeling okay.  Denies chest pain.  Still with back pain.       ROS:  10 systems reviewed other than what is mentioned above.     MEDICATION:    Scheduled medications  aspirin, 81 mg, oral, Nightly  atorvastatin, 80 mg, oral, Nightly  cholecalciferol, 2,000 Units, oral, Daily  clopidogrel, 75 mg, oral, Daily  donepezil, 10 mg, oral, Nightly  enoxaparin, 40 mg, subcutaneous, q24h  escitalopram, 5 mg, oral, Daily  [Held by provider] lisinopril, 10 mg, oral, Daily  multivitamin with minerals, 1 tablet, oral, Daily  nicotine, 1 patch, transdermal, Daily   Followed by  [START ON 4/5/2024] nicotine, 1 patch, transdermal, Daily   Followed by  [START ON 4/19/2024] nicotine, 1 patch, transdermal, Daily  perflutren lipid microspheres, 0.5-10 mL of dilution, intravenous, Once in imaging  polyethylene glycol, 17 g, oral, Daily  pregabalin, 50 mg, oral, BID  sennosides, 1 tablet, oral, Nightly      Continuous medications     PRN medications  PRN medications: acetaminophen **OR** [DISCONTINUED] acetaminophen **OR** acetaminophen, albuterol, albuterol, alum-mag hydroxide-simeth, guaiFENesin, ipratropium-albuteroL, nicotine polacrilex, oxygen, traMADol     Principal Problem:    Dizziness      OBJECTIVE:    Visit Vitals  /55 (BP Location: Left arm, Patient Position: Lying)   Pulse 51   Temp 36.8 °C (98.2 °F) (Temporal)   Resp 17          Intake/Output Summary (Last 24 hours) at 2/27/2024 0839  Last data filed at 2/27/2024 0600  Gross per 24 hour   Intake 420 ml   Output --   Net 420 ml                Patient is alert and oriented x3.  HEENT is unremarkable mucous members are moist  Neck no JVP no bruits upstrokes are full no thyromegaly  Lungs are clear bilaterally.  No wheezing crackles or rales  Heart regular rhythm normal S1-S2 there is no S3 soft ejection murmur  Abdomen is soft vessels are  positive nontender nondistended no organomegaly no pulsatile masses  Extremities have no edema.  Distal pulses present palpable.  Neuro is grossly nonfocal  Skin has no rashes     Image Results  ECG 12 lead  Sinus bradycardia  Cannot rule out Anterior infarct , age undetermined  Abnormal ECG  When compared with ECG of 15-FEB-2024 14:39,  PREVIOUS ECG IS PRESENT  See ED provider note for full interpretation and clinical correlation  Confirmed by Alma Rosa Alvarenga ([id number]) on 2/24/2024 7:24:40 PM        Relevant Results:  RESULTS:    Lab Results   Component Value Date    WBC 7.1 02/26/2024    HGB 11.2 (L) 02/26/2024    HCT 34.0 (L) 02/26/2024    MCV 95 02/26/2024     02/26/2024        Lab Results   Component Value Date    CREATININE 0.65 02/26/2024    BUN 18 02/26/2024     (L) 02/26/2024    K 4.5 02/26/2024     02/26/2024    CO2 27 02/26/2024        Results from last 7 days   Lab Units 02/26/24  0636 02/24/24  0641 02/23/24  0635 02/22/24  1307   PROTEIN TOTAL g/dL  --   --   --  6.1*   BILIRUBIN TOTAL mg/dL  --   --   --  0.5   ALK PHOS U/L  --   --   --  53   ALT U/L  --   --   --  10   AST U/L  --   --   --  14   GLUCOSE mg/dL 78   < > 80 86   TSH mIU/L  --   --  2.63  --    TRIGLYCERIDES mg/dL  --   --  64  --    HDL mg/dL  --   --  37.4  --    HEMOGLOBIN A1C %  --   --  5.3  --    BNP pg/mL  --   --   --  32    < > = values in this interval not displayed.       Assessment/Plan   2/24/2024    1.  Chest pain.  Clinically not anginal.  Echo revealed normal LV function.  Biomarkers negative.  EKG without changes.    2.  Hypertension.  Blood pressure is well-controlled.    3.  Hyperlipidemia continue atorvastatin    4.  Preoperative clearance.  At this point, the patient wants to hold off on any neurosurgical intervention.  However in the future he may choose to proceed.  Surgical risk moderately elevated but acceptable.  Okay to hold aspirin for 5 days prior to the surgery.    2/27/2024    1.  Chest pain  not anginal.  Echo revealed normal LV function with negative biomarkers.  This is mostly musculoskeletal.    2.  Hypertension.  Blood pressures are actually low.  Lisinopril is being held.    3.  Hyperlipidemia he is on atorvastatin    4.  Preoperative clearance.  The patient does not want any neurosurgical procedures at this time.  If he chooses to proceed, risk is acceptable.  Hold aspirin 5 days prior.    Raffaele Crenshaw MD

## 2024-02-27 NOTE — PROGRESS NOTES
Physical Therapy    Physical Therapy Treatment    Patient Name: Oni Newsome  MRN: [id number]  Today's Date: 2/27/2024  Time Calculation  Start Time: 1400  Stop Time: 1425  Time Calculation (min): 25 min       Assessment/Plan   PT Assessment  End of Session Communication: Bedside nurse  End of Session Patient Position: Up in chair, Alarm off, not on at start of session (confirmed with RN pt does not require posey alarm)     PT Plan  Treatment/Interventions: Bed mobility, Transfer training, Gait training  PT Plan: Skilled PT  PT Frequency: 4 times per week  PT Discharge Recommendations: Moderate intensity level of continued care  PT - OK to Discharge: Yes (once cleared by medical team)      General Visit Information:   PT  Visit  PT Received On: 02/27/24  General  Patient Position Received:  (pt ambulating out of room and asking therapist for assistance to walk in hallway)    Subjective      Vital Signs:  Vital Signs  SpO2:  (97%)    Objective   Pain:  Pain Assessment  Pain Assessment: 0-10  Pain Score: 10 - Worst possible pain  Pain Location: Back  Pain Orientation: Lower  Pain Interventions:  (pt asking for pain medication; communicated with RN)     Treatments:  Therapeutic Exercise  Therapeutic Exercise Performed:  (seated BLE AP, GS, LAQ, and QS with chair legrest extended, 20 reps ea.  pt c/o feeling somewhat SOB during ther ex, SpO2 WNL.)    Bed Mobility  Bed Mobility:  (NT - pt seen ambulating out of room upon therapy arrival and seated in chair upon departure)    Ambulation/Gait Training  Ambulation/Gait Training Performed:  (pt ambulates >/=50 ft x 2 as well as several short bouts around room as pt repeatedly standing/walking more despite directions to remain seated.  FWW and SBA overall.)    Transfers  Transfer:  (sit <> stand multiple trials with FWW, mod I however requires cuing for safety.  pt moves somewhat impulsively and often standing back up without warning)    Outcome Measures:  [facility] Basic  Mobility  Turning from your back to your side while in a flat bed without using bedrails: A little  Moving from lying on your back to sitting on the side of a flat bed without using bedrails: A little  Moving to and from bed to chair (including a wheelchair): A little  Standing up from a chair using your arms (e.g. wheelchair or bedside chair): A little  To walk in hospital room: A little  Climbing 3-5 steps with railing: Total  Basic Mobility - Total Score: 16    Education Documentation  Mobility Training, taught by Janee Rosenbaum PTA at 2/27/2024  3:08 PM.  Learner: Patient  Readiness: Acceptance  Method: Explanation  Response: Verbalizes Understanding, Needs Reinforcement    Education Comments  No comments found.        EDUCATION:       Encounter Problems             PT Problem       STG - Pt will transition supine <> sitting with SBA  (Met)       Start:  02/23/24    Expected End:  03/02/24    Resolved:  02/27/24         STG - Pt will transfer STS with SBA  (Met)       Start:  02/23/24    Expected End:  03/02/24    Resolved:  02/27/24         STG - Pt will amb 30' using RW with CGA  (Met)       Start:  02/23/24    Expected End:  03/02/24    Resolved:  02/27/24

## 2024-02-27 NOTE — CARE PLAN
The patient's goals for the shift include sleep well    The clinical goals for the shift include Maintain safety.    Over the shift, the patient did not make progress toward the following goals. Barriers to progression include acute illness. Recommendations to address these barriers include communication.

## 2024-02-28 ENCOUNTER — APPOINTMENT (OUTPATIENT)
Dept: RADIOLOGY | Facility: HOSPITAL | Age: 74
End: 2024-02-28
Payer: MEDICARE

## 2024-02-28 ENCOUNTER — APPOINTMENT (OUTPATIENT)
Dept: RADIOLOGY | Facility: CLINIC | Age: 74
End: 2024-02-28
Payer: MEDICARE

## 2024-02-28 PROCEDURE — G0378 HOSPITAL OBSERVATION PER HR: HCPCS

## 2024-02-28 NOTE — CARE PLAN
The patient's goals for the shift include to discharge to facility.    The clinical goals for the shift include pt will remain safe throughout the shift.    Over the shift, the patient did make progress towards goals.  Patient discharged to facility safely, all needs met.

## 2024-02-28 NOTE — NURSING NOTE
Patient discharged to [facility] via [facility] with all belongings, IV removed.  Discharge instructions given to transport.  Nurse unable to reach facility for nurse to nurse report despite multiple efforts throughout shift to make contact.

## 2024-02-28 NOTE — PROGRESS NOTES
Oni Newsome is a 73 y.o. male on day 0 of admission presenting with Dizziness.      Subjective   No events overnight.  Patient seen and examined at bedside.  No new complaints.        Objective     Last Recorded Vitals  /61   Pulse 60   Temp 36.3 °C (97.3 °F)   Resp 17   Wt 63.5 kg (139 lb 15.9 oz)   SpO2 92%   Intake/Output last 3 Shifts:    Intake/Output Summary (Last 24 hours) at 2/27/2024 2041  Last data filed at 2/27/2024 0600  Gross per 24 hour   Intake 420 ml   Output --   Net 420 ml         Admission Weight  Weight: 63.5 kg (140 lb) (02/22/24 1234)    Daily Weight  02/23/24 : 63.5 kg (139 lb 15.9 oz)    Image Results  ECG 12 lead  Sinus bradycardia  Cannot rule out Anterior infarct , age undetermined  Abnormal ECG  When compared with ECG of 15-FEB-2024 14:39,  PREVIOUS ECG IS PRESENT  See ED provider note for full interpretation and clinical correlation  Confirmed by Alma Rosa Alvarenga ([id number]) on 2/24/2024 7:24:40 PM      Physical Exam  Constitutional:       General: He is awake. He is not in acute distress.     Appearance: Normal appearance. He is not toxic-appearing.   HENT:      Head: Atraumatic.      Nose: Nose normal.      Mouth/Throat:      Mouth: Mucous membranes are moist.   Eyes:      Extraocular Movements: Extraocular movements intact.      Conjunctiva/sclera: Conjunctivae normal.      Pupils: Pupils are equal, round, and reactive to light.   Cardiovascular:      Rate and Rhythm: Normal rate and regular rhythm.      Pulses: Normal pulses.      Heart sounds: No murmur heard.  Pulmonary:      Effort: Pulmonary effort is normal.      Breath sounds: Wheezing and rhonchi present.   Abdominal:      General: Bowel sounds are normal. There is no distension.      Palpations: Abdomen is soft.      Tenderness: There is no abdominal tenderness. There is no guarding.   Musculoskeletal:         General: No swelling, deformity or signs of injury. Normal range of motion.      Cervical back: Neck supple.       Right lower leg: No edema.      Left lower leg: No edema.   Skin:     General: Skin is warm and dry.      Capillary Refill: Capillary refill takes less than 2 seconds.      Findings: No ecchymosis, erythema or wound.   Neurological:      General: No focal deficit present.      Mental Status: He is alert and oriented to person, place, and time.   Psychiatric:         Mood and Affect: Affect is flat.         Speech: Speech is delayed.         Behavior: Behavior is cooperative.         Thought Content: Thought content normal.     Relevant Results               Assessment/Plan        Principal Problem:    Dizziness    73-year-old male with past medical history of Alzheimer's dementia, hypertension, hyperlipidemia, COPD, nicotine use disorder, AAA, PAD s/p angioplasty LLE and left great toe amputation, BPH, macular degeneration, and cataracts.  Patient presents to the hospital with multiple complaints most notably for dizziness, chest pain, generalized weakness/fatigue, and difficulty with ADLs at home.     #Dizziness  #Tremor, left upper extremity  #Weakness, generalized     Telemetry monitoring  Neurology consult for input  MRI of the brain, MRA of the head and neck  Echocardiogram  Orthostatic vitals  PT/OT with [facility]  Social work for discharge planning, patient concerned he can no longer care for himself     #Chest pain     Cardiology consult, appreciate input  Reports left chest discomfort, sounds pleuritic in nature  Initial ischemic workup in the ED negative.  Trend troponins.  Echocardiogram  Lipid panel in a.m.  Aspirin 81 mg daily     #COPD  #Nicotine use disorder     Patient is wheezing/coughing on exam  Nebulizers as needed and scheduled  If not improved may benefit from course of steroids  Currently on room air, supplemental oxygen as needed  RT consult  Nicotine patch and gum while hospitalized  Mucinex twice daily     #Constipation     Last bowel movement 3 days ago, he does report black stools, however  low suspicion for GI bleed  MiraLAX daily  Monitor     #Enlarged left hilar node  #Lung nodules     OP follow up, already referred to lung nodule clinic     Chronic conditions:  #PAD s/p left lower extremity angioplasty  #History AAA  #Hypertension  # Hyperlipidemia  #BPH  #Macular degeneration  #Cataracts     Continue with patient's home medications once entered by nursing/pharmacy     #DVT prophylaxis  SCDs  Lovenox subcutaneous     2/24: MRIs show critical stenosis with nerve root and cord compression in the cervical and lumbar spine.  Neurosurgery recommending decompressive laminectomy with fusion.  Patient cleared from a cardiac perspective.  He does have a pending outpatient PET scan for a hilar node on CT chest.  Tentative plan is to discharge to SNF at the beginning of next week to maintain outpatient PET scan appointment and have close follow-up with neurosurgery to discuss potential spinal surgery.    2/25: Patient is medically ready for discharge to SNF.  He will have an outpatient PET scan and neurosurgery consultation.    2/27: Patient remains medically ready for discharge.  Awaiting pre-CERT.      Malnutrition Diagnosis Status: New  Malnutrition Diagnosis: Moderate malnutrition related to chronic disease or condition  As Evidenced by: moderate muscle and fat loss noted on physical exam.  < 75% energy intake compared to estimated needs for > 1 month.  Pt is 84% of his IBW,  has dementia and is living by himself up until now  I agree with the dietitian's malnutrition diagnosis.         Michael Bean, DO

## 2024-03-05 ENCOUNTER — APPOINTMENT (OUTPATIENT)
Dept: PRIMARY CARE | Facility: CLINIC | Age: 74
End: 2024-03-05
Payer: MEDICARE

## 2024-04-15 ENCOUNTER — TELEPHONE (OUTPATIENT)
Dept: PRIMARY CARE | Facility: CLINIC | Age: 74
End: 2024-04-15
Payer: MEDICARE

## 2024-04-18 ENCOUNTER — TELEPHONE (OUTPATIENT)
Dept: PRIMARY CARE | Facility: CLINIC | Age: 74
End: 2024-04-18
Payer: MEDICARE

## 2024-04-18 NOTE — TELEPHONE ENCOUNTER
Open orders, LVM  After multiple attempts at contact with no returned communication to the lung nodule clinic a discharge is placed and sent via certified letter.  A discharge will discontinue communication efforts, but the patient can continue care when ready.

## 2024-04-19 NOTE — OP NOTE
SURGEON:  Eldon Mcqueen DPM    BRIEF SUMMARY:    This is a 72-year-old male, who presents [facility] for  outpatient surgery.  The patient has gangrene of the distal left  great toe.  He recently had vascular surgery to improve blood flow by  his vascular surgeon, Dr. Vigil with [facility].  The patient  has been cleared for surgery from a vascular standpoint.  Planned  procedure was amputation of the left great toe.  The surgical  procedure, risks, and complications have been discussed with the  patient including, but not limited to dehiscence, staged procedure,  further debridement of the left foot if healing does not take.  The  patient read and signed consent form.  All questions asked to have  been answered.  The patient cleared for surgery.     PREOPERATIVE DIAGNOSIS:    Gangrene, left great toe.    POSTOP DIAGNOSIS:    Gangrene, left great toe.    OPERATION:    Amputation, left great toe.    ANESTHESIA:    MAC.    Surgeon: Eldon Mcqueen DPM    :    Jayla Padilla DPM PGY 3.    DESCRIPTION OF PROCEDURE:    The patient was brought in the operating room, placed on the  operating table in the supine position.  The left great toe was  anesthetized with 1% xylocaine plain and 0.5% Marcaine plain.  This  block was performed proximal to the metatarsophalangeal joint.  The  left foot was then prepped and draped in the usual sterile manner.  At this time, a dorsal incision was performed up to the distal IP  joint and converging in the same pattern plantarly.  Careful lyto   preserve the dorsal skin and plantar skin, dissection was performed  ofup  to the metatarsophalangeal joint.  The toe was disarticulated and  sent for pathological evaluation.  Any nonviable tissue was removed,  which was very minimal.  There was good perfusion.  Better perfusion  noted plantarly than dorsally.  The surgical site was flushed with  copious amounts of sterile saline and suctioned  free of any debris.  Deep cultures were performed.  At this time, the skin was closed.  He had good perfusion dorsally and plantarly  to the skin.  Marcaine 0.5% plain was utilized for postop injection  followed by lightly applied Betadine Adaptic, 4x4s, Conform gauze,  lightly applied Ace bandage, stockinette and surgical shoe.  The  patient given home go instructions and will be seen this Tuesday at  the wound center.       Eldon Mcqueen DPM    DD:  04/16/2023 08:28:46 EST  DT:  04/16/2023 09:10:30 EST  DICTATION NUMBER:  [id number]  INTERNAL JOB NUMBER:  [id number]            Electronic Signatures:  Eldon Mcqueen (NORIS) (Signed on 26-Apr-2023 09:47)   Authored   Unsigned, Draft (SYS GENERATED) (Entered on 16-Apr-2023 09:10)   Entered     Last Updated: 26-Apr-2023 09:47 by Eldon Mcqueen (NORIS)